# Patient Record
Sex: MALE | Race: WHITE | NOT HISPANIC OR LATINO
[De-identification: names, ages, dates, MRNs, and addresses within clinical notes are randomized per-mention and may not be internally consistent; named-entity substitution may affect disease eponyms.]

---

## 2018-09-02 ENCOUNTER — TRANSCRIPTION ENCOUNTER (OUTPATIENT)
Age: 73
End: 2018-09-02

## 2019-08-23 ENCOUNTER — APPOINTMENT (OUTPATIENT)
Dept: UROLOGY | Facility: CLINIC | Age: 74
End: 2019-08-23
Payer: MEDICARE

## 2019-08-23 VITALS
HEART RATE: 59 BPM | WEIGHT: 160 LBS | TEMPERATURE: 98.2 F | BODY MASS INDEX: 25.71 KG/M2 | SYSTOLIC BLOOD PRESSURE: 129 MMHG | RESPIRATION RATE: 17 BRPM | HEIGHT: 66 IN | DIASTOLIC BLOOD PRESSURE: 85 MMHG

## 2019-08-23 DIAGNOSIS — R39.15 URGENCY OF URINATION: ICD-10-CM

## 2019-08-23 DIAGNOSIS — N39.41 URGE INCONTINENCE: ICD-10-CM

## 2019-08-23 PROCEDURE — 99204 OFFICE O/P NEW MOD 45 MIN: CPT | Mod: 25

## 2019-08-23 PROCEDURE — 51798 US URINE CAPACITY MEASURE: CPT

## 2019-08-23 NOTE — PHYSICAL EXAM
[General Appearance - Well Developed] : well developed [Normal Appearance] : normal appearance [General Appearance - Well Nourished] : well nourished [General Appearance - In No Acute Distress] : no acute distress [Well Groomed] : well groomed [Edema] : no peripheral edema [Respiration, Rhythm And Depth] : normal respiratory rhythm and effort [Exaggerated Use Of Accessory Muscles For Inspiration] : no accessory muscle use [Abdomen Soft] : soft [Abdomen Tenderness] : non-tender [Costovertebral Angle Tenderness] : no ~M costovertebral angle tenderness [Urinary Bladder Findings] : the bladder was normal on palpation [Normal Station and Gait] : the gait and station were normal for the patient's age [] : no rash [No Focal Deficits] : no focal deficits [Sensation] : the sensory exam was normal to light touch and pinprick [Oriented To Time, Place, And Person] : oriented to person, place, and time [Affect] : the affect was normal [Not Anxious] : not anxious

## 2019-08-23 NOTE — ASSESSMENT
[FreeTextEntry1] : 75 yo male with urinary urgency with incontinence. Will give a trial of Uroxatral at this time.  Discussed behavior modification regarding symptoms.  \par \par Follow up in the coming months to discuss effectiveness of medical therapy.

## 2019-08-23 NOTE — REVIEW OF SYSTEMS
[Eyesight Problems] : eyesight problems [Heartburn] : heartburn [Dry Eyes] : dryness of the eyes [see HPI] : see HPI [Joint Pain] : joint pain [Joint Swelling] : joint swelling [Negative] : Psychiatric

## 2019-08-23 NOTE — HISTORY OF PRESENT ILLNESS
[FreeTextEntry1] : 75 yo male who presents for evaluation of urinary urgency with incontinence. He has noted worsening urinary urgency for the past six months and would like to discuss options for treatment. He reports intermittent episodes of frequency with urgency with incontinence at times. He has not noted a pattern for these symptoms and they come on at differing times. He denies hematuria, dysuria, hesitancy, and rarely has nocturia once per night and no episodes of incontinence at night. \par PVR 0\par He has prior been seen by a urologist for Herpes, and erectile dysfunction. His mother had renal cancer.  He has no prior urinary issues prior to the past 6 months to a year.

## 2019-08-24 ENCOUNTER — TRANSCRIPTION ENCOUNTER (OUTPATIENT)
Age: 74
End: 2019-08-24

## 2019-08-26 LAB
BILIRUB UR QL STRIP: NEGATIVE
GLUCOSE UR-MCNC: NEGATIVE
HCG UR QL: 0.2 EU/DL
HGB UR QL STRIP.AUTO: NORMAL
KETONES UR-MCNC: NEGATIVE
LEUKOCYTE ESTERASE UR QL STRIP: NEGATIVE
NITRITE UR QL STRIP: NEGATIVE
PH UR STRIP: 6.5
PROT UR STRIP-MCNC: NORMAL
SP GR UR STRIP: 1.01

## 2019-09-24 ENCOUNTER — MEDICATION RENEWAL (OUTPATIENT)
Age: 74
End: 2019-09-24

## 2019-09-24 RX ORDER — ALFUZOSIN HYDROCHLORIDE 10 MG/1
10 TABLET, EXTENDED RELEASE ORAL
Qty: 90 | Refills: 3 | Status: ACTIVE | COMMUNITY
Start: 2019-08-23 | End: 1900-01-01

## 2021-08-22 ENCOUNTER — TRANSCRIPTION ENCOUNTER (OUTPATIENT)
Age: 76
End: 2021-08-22

## 2022-05-14 ENCOUNTER — HOSPITAL ENCOUNTER (EMERGENCY)
Age: 77
Discharge: HOME | End: 2022-05-15
Attending: STUDENT IN AN ORGANIZED HEALTH CARE EDUCATION/TRAINING PROGRAM

## 2022-05-14 ENCOUNTER — APPOINTMENT (OUTPATIENT)
Dept: GENERAL RADIOLOGY | Age: 77
End: 2022-05-14
Attending: STUDENT IN AN ORGANIZED HEALTH CARE EDUCATION/TRAINING PROGRAM

## 2022-05-14 ENCOUNTER — APPOINTMENT (OUTPATIENT)
Dept: CT IMAGING | Age: 77
End: 2022-05-14
Attending: STUDENT IN AN ORGANIZED HEALTH CARE EDUCATION/TRAINING PROGRAM

## 2022-05-14 DIAGNOSIS — T17.228A FISHBONE IN PHARYNX: Primary | ICD-10-CM
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
## 2022-05-14 LAB
ALBUMIN SERPL-MCNC: 3.6 G/DL (ref 3.6–5.1)
ALBUMIN/GLOB SERPL: 1.2 {RATIO} (ref 1–2.4)
ALP SERPL-CCNC: 59 UNITS/L (ref 45–117)
ALT SERPL-CCNC: 31 UNITS/L
ANION GAP SERPL CALC-SCNC: 13 MMOL/L (ref 10–20)
AST SERPL-CCNC: 17 UNITS/L
BASOPHILS # BLD: 0.1 K/MCL (ref 0–0.3)
BASOPHILS NFR BLD: 1 %
BILIRUB SERPL-MCNC: 0.8 MG/DL (ref 0.2–1)
BUN SERPL-MCNC: 19 MG/DL (ref 6–20)
BUN/CREAT SERPL: 15 (ref 7–25)
CALCIUM SERPL-MCNC: 8.8 MG/DL (ref 8.4–10.2)
CHLORIDE SERPL-SCNC: 107 MMOL/L (ref 98–107)
CO2 SERPL-SCNC: 27 MMOL/L (ref 21–32)
CREAT SERPL-MCNC: 1.26 MG/DL (ref 0.67–1.17)
DEPRECATED RDW RBC: 43 FL (ref 39–50)
EOSINOPHIL # BLD: 0.5 K/MCL (ref 0–0.5)
EOSINOPHIL NFR BLD: 5 %
ERYTHROCYTE [DISTWIDTH] IN BLOOD: 12.4 % (ref 11–15)
FASTING DURATION TIME PATIENT: ABNORMAL H
GFR SERPLBLD BASED ON 1.73 SQ M-ARVRAT: 59 ML/MIN
GLOBULIN SER-MCNC: 3 G/DL (ref 2–4)
GLUCOSE SERPL-MCNC: 77 MG/DL (ref 70–99)
HCT VFR BLD CALC: 43.6 % (ref 39–51)
HGB BLD-MCNC: 14.6 G/DL (ref 13–17)
IMM GRANULOCYTES # BLD AUTO: 0.1 K/MCL (ref 0–0.2)
IMM GRANULOCYTES # BLD: 1 %
LYMPHOCYTES # BLD: 1.9 K/MCL (ref 1–4)
LYMPHOCYTES NFR BLD: 20 %
MCH RBC QN AUTO: 32.2 PG (ref 26–34)
MCHC RBC AUTO-ENTMCNC: 33.5 G/DL (ref 32–36.5)
MCV RBC AUTO: 96.2 FL (ref 78–100)
MONOCYTES # BLD: 0.9 K/MCL (ref 0.3–0.9)
MONOCYTES NFR BLD: 10 %
NEUTROPHILS # BLD: 5.9 K/MCL (ref 1.8–7.7)
NEUTROPHILS NFR BLD: 63 %
NRBC BLD MANUAL-RTO: 0 /100 WBC
PLATELET # BLD AUTO: 251 K/MCL (ref 140–450)
POTASSIUM SERPL-SCNC: 4 MMOL/L (ref 3.4–5.1)
PROT SERPL-MCNC: 6.6 G/DL (ref 6.4–8.2)
RBC # BLD: 4.53 MIL/MCL (ref 4.5–5.9)
SODIUM SERPL-SCNC: 143 MMOL/L (ref 135–145)
WBC # BLD: 9.3 K/MCL (ref 4.2–11)

## 2022-05-14 PROCEDURE — 70490 CT SOFT TISSUE NECK W/O DYE: CPT

## 2022-05-14 PROCEDURE — 85025 COMPLETE CBC W/AUTO DIFF WBC: CPT | Performed by: EMERGENCY MEDICINE

## 2022-05-14 PROCEDURE — 70360 X-RAY EXAM OF NECK: CPT

## 2022-05-14 PROCEDURE — 74018 RADEX ABDOMEN 1 VIEW: CPT

## 2022-05-14 PROCEDURE — 71250 CT THORAX DX C-: CPT

## 2022-05-14 PROCEDURE — 99284 EMERGENCY DEPT VISIT MOD MDM: CPT

## 2022-05-14 PROCEDURE — G1004 CDSM NDSC: HCPCS

## 2022-05-14 PROCEDURE — 80053 COMPREHEN METABOLIC PANEL: CPT | Performed by: EMERGENCY MEDICINE

## 2022-05-14 PROCEDURE — 71046 X-RAY EXAM CHEST 2 VIEWS: CPT

## 2022-05-14 PROCEDURE — C9803 HOPD COVID-19 SPEC COLLECT: HCPCS

## 2022-05-14 RX ORDER — ACYCLOVIR 200 MG/1
200 CAPSULE ORAL 3 TIMES DAILY
COMMUNITY

## 2022-05-14 ASSESSMENT — PAIN SCALES - GENERAL: PAINLEVEL_OUTOF10: 2

## 2022-05-15 ENCOUNTER — ANESTHESIA (OUTPATIENT)
Dept: SURGERY | Age: 77
End: 2022-05-15

## 2022-05-15 ENCOUNTER — ANESTHESIA EVENT (OUTPATIENT)
Dept: SURGERY | Age: 77
End: 2022-05-15

## 2022-05-15 LAB
FLUAV RNA RESP QL NAA+PROBE: NOT DETECTED
FLUBV RNA RESP QL NAA+PROBE: NOT DETECTED
RAINBOW EXTRA TUBES HOLD SPECIMEN: NORMAL
RAINBOW EXTRA TUBES HOLD SPECIMEN: NORMAL
RSV AG NPH QL IA.RAPID: NOT DETECTED
SARS-COV-2 RNA RESP QL NAA+PROBE: NOT DETECTED
SERVICE CMNT-IMP: NORMAL
SERVICE CMNT-IMP: NORMAL

## 2022-05-15 PROCEDURE — 88300 SURGICAL PATH GROSS: CPT | Performed by: OTOLARYNGOLOGY

## 2022-05-15 PROCEDURE — 10002800 HB RX 250 W HCPCS: Performed by: STUDENT IN AN ORGANIZED HEALTH CARE EDUCATION/TRAINING PROGRAM

## 2022-05-15 PROCEDURE — 0241U COVID/FLU/RSV PANEL: CPT | Performed by: STUDENT IN AN ORGANIZED HEALTH CARE EDUCATION/TRAINING PROGRAM

## 2022-05-15 PROCEDURE — 13000036 HB COMPLEX  CASE S/U + 1ST 15 MIN: Performed by: OTOLARYNGOLOGY

## 2022-05-15 PROCEDURE — 10004451 HB PACU RECOVERY 1ST 30 MINUTES: Performed by: OTOLARYNGOLOGY

## 2022-05-15 PROCEDURE — 13000002 HB ANESTHESIA  GENERAL  S/U + 1ST 15 MIN: Performed by: OTOLARYNGOLOGY

## 2022-05-15 PROCEDURE — 10002801 HB RX 250 W/O HCPCS: Performed by: STUDENT IN AN ORGANIZED HEALTH CARE EDUCATION/TRAINING PROGRAM

## 2022-05-15 PROCEDURE — 13000037 HB COMPLEX CASE EACH ADD MINUTE: Performed by: OTOLARYNGOLOGY

## 2022-05-15 PROCEDURE — 99285 EMERGENCY DEPT VISIT HI MDM: CPT | Performed by: STUDENT IN AN ORGANIZED HEALTH CARE EDUCATION/TRAINING PROGRAM

## 2022-05-15 PROCEDURE — 10002807 HB RX 258: Performed by: STUDENT IN AN ORGANIZED HEALTH CARE EDUCATION/TRAINING PROGRAM

## 2022-05-15 PROCEDURE — 13000003 HB ANESTHESIA  GENERAL EA ADD MINUTE: Performed by: OTOLARYNGOLOGY

## 2022-05-15 PROCEDURE — 10004452 HB PACU ADDL 30 MINUTES: Performed by: OTOLARYNGOLOGY

## 2022-05-15 PROCEDURE — 13000001 HB PHASE II RECOVERY EA 30 MINUTES: Performed by: OTOLARYNGOLOGY

## 2022-05-15 RX ORDER — LIDOCAINE HYDROCHLORIDE 40 MG/ML
SOLUTION TOPICAL PRN
Status: DISCONTINUED | OUTPATIENT
Start: 2022-05-15 | End: 2022-05-15

## 2022-05-15 RX ORDER — MIDAZOLAM HYDROCHLORIDE 1 MG/ML
INJECTION, SOLUTION INTRAMUSCULAR; INTRAVENOUS PRN
Status: DISCONTINUED | OUTPATIENT
Start: 2022-05-15 | End: 2022-05-15

## 2022-05-15 RX ORDER — SODIUM CHLORIDE, SODIUM LACTATE, POTASSIUM CHLORIDE, CALCIUM CHLORIDE 600; 310; 30; 20 MG/100ML; MG/100ML; MG/100ML; MG/100ML
INJECTION, SOLUTION INTRAVENOUS CONTINUOUS PRN
Status: DISCONTINUED | OUTPATIENT
Start: 2022-05-15 | End: 2022-05-15

## 2022-05-15 RX ORDER — PROPOFOL 10 MG/ML
INJECTION, EMULSION INTRAVENOUS PRN
Status: DISCONTINUED | OUTPATIENT
Start: 2022-05-15 | End: 2022-05-15

## 2022-05-15 RX ADMIN — SODIUM CHLORIDE, POTASSIUM CHLORIDE, SODIUM LACTATE AND CALCIUM CHLORIDE: 600; 310; 30; 20 INJECTION, SOLUTION INTRAVENOUS at 07:13

## 2022-05-15 RX ADMIN — MIDAZOLAM HYDROCHLORIDE 2 MG: 1 INJECTION, SOLUTION INTRAMUSCULAR; INTRAVENOUS at 07:22

## 2022-05-15 RX ADMIN — LIDOCAINE HYDROCHLORIDE 1 APPLICATION.: 40 SOLUTION TOPICAL at 07:30

## 2022-05-15 RX ADMIN — PROPOFOL 150 MG: 10 INJECTION, EMULSION INTRAVENOUS at 07:28

## 2022-05-15 ASSESSMENT — ENCOUNTER SYMPTOMS
DIARRHEA: 0
WHEEZING: 0
EYE PAIN: 0
WEAKNESS: 0
LIGHT-HEADEDNESS: 0
NUMBNESS: 0
FEVER: 0
APPETITE CHANGE: 0
COLOR CHANGE: 0
NAUSEA: 0
AGITATION: 0
CHILLS: 0
CHEST TIGHTNESS: 0
FATIGUE: 0
SEIZURES: 0
SORE THROAT: 0
BLOOD IN STOOL: 0
VOMITING: 0
TROUBLE SWALLOWING: 1
COUGH: 0
ABDOMINAL PAIN: 0
BACK PAIN: 0
ABDOMINAL DISTENTION: 0
NERVOUS/ANXIOUS: 0
SEIZURES: 0
SHORTNESS OF BREATH: 0
EXERCISE TOLERANCE: GOOD (>4 METS)
DIZZINESS: 0

## 2022-05-15 ASSESSMENT — PAIN SCALES - GENERAL
PAINLEVEL_OUTOF10: 0
PAINLEVEL_OUTOF10: 0

## 2022-05-16 VITALS
TEMPERATURE: 96.8 F | OXYGEN SATURATION: 95 % | SYSTOLIC BLOOD PRESSURE: 158 MMHG | BODY MASS INDEX: 27.64 KG/M2 | WEIGHT: 171.96 LBS | RESPIRATION RATE: 20 BRPM | DIASTOLIC BLOOD PRESSURE: 80 MMHG | HEIGHT: 66 IN | HEART RATE: 58 BPM

## 2022-05-17 LAB
ASR DISCLAIMER: NORMAL
CASE RPRT: NORMAL
CLINICAL INFO: NORMAL
PATH REPORT.FINAL DX SPEC: NORMAL
PATH REPORT.GROSS SPEC: NORMAL

## 2022-06-14 ENCOUNTER — EMERGENCY (EMERGENCY)
Facility: HOSPITAL | Age: 77
LOS: 1 days | Discharge: HOME | End: 2022-06-14
Attending: PERSONAL EMERGENCY RESPONSE ATTENDANT
Payer: MEDICARE

## 2022-06-14 VITALS
HEART RATE: 84 BPM | TEMPERATURE: 98 F | DIASTOLIC BLOOD PRESSURE: 89 MMHG | WEIGHT: 195.99 LBS | RESPIRATION RATE: 20 BRPM | OXYGEN SATURATION: 99 % | SYSTOLIC BLOOD PRESSURE: 166 MMHG

## 2022-06-14 VITALS
HEART RATE: 64 BPM | DIASTOLIC BLOOD PRESSURE: 67 MMHG | TEMPERATURE: 97 F | RESPIRATION RATE: 18 BRPM | OXYGEN SATURATION: 100 % | SYSTOLIC BLOOD PRESSURE: 137 MMHG

## 2022-06-14 LAB
ALBUMIN SERPL ELPH-MCNC: 3.8 G/DL — SIGNIFICANT CHANGE UP (ref 3.3–5)
ALP SERPL-CCNC: 64 U/L — SIGNIFICANT CHANGE UP (ref 40–120)
ALT FLD-CCNC: 23 U/L — SIGNIFICANT CHANGE UP (ref 10–45)
ANION GAP SERPL CALC-SCNC: 11 MMOL/L — SIGNIFICANT CHANGE UP (ref 5–17)
AST SERPL-CCNC: 21 U/L — SIGNIFICANT CHANGE UP (ref 10–40)
BASOPHILS # BLD AUTO: 0.07 K/UL — SIGNIFICANT CHANGE UP (ref 0–0.2)
BASOPHILS NFR BLD AUTO: 0.7 % — SIGNIFICANT CHANGE UP (ref 0–2)
BILIRUB SERPL-MCNC: 0.3 MG/DL — SIGNIFICANT CHANGE UP (ref 0.2–1.2)
BUN SERPL-MCNC: 15 MG/DL — SIGNIFICANT CHANGE UP (ref 7–23)
CALCIUM SERPL-MCNC: 9 MG/DL — SIGNIFICANT CHANGE UP (ref 8.4–10.5)
CHLORIDE SERPL-SCNC: 102 MMOL/L — SIGNIFICANT CHANGE UP (ref 96–108)
CO2 SERPL-SCNC: 24 MMOL/L — SIGNIFICANT CHANGE UP (ref 22–31)
CREAT SERPL-MCNC: 0.91 MG/DL — SIGNIFICANT CHANGE UP (ref 0.5–1.3)
EGFR: 87 ML/MIN/1.73M2 — SIGNIFICANT CHANGE UP
EOSINOPHIL # BLD AUTO: 0.12 K/UL — SIGNIFICANT CHANGE UP (ref 0–0.5)
EOSINOPHIL NFR BLD AUTO: 1.3 % — SIGNIFICANT CHANGE UP (ref 0–6)
GLUCOSE SERPL-MCNC: 105 MG/DL — HIGH (ref 70–99)
HCT VFR BLD CALC: 41.9 % — SIGNIFICANT CHANGE UP (ref 39–50)
HGB BLD-MCNC: 13.6 G/DL — SIGNIFICANT CHANGE UP (ref 13–17)
IMM GRANULOCYTES NFR BLD AUTO: 2 % — HIGH (ref 0–1.5)
LYMPHOCYTES # BLD AUTO: 1.12 K/UL — SIGNIFICANT CHANGE UP (ref 1–3.3)
LYMPHOCYTES # BLD AUTO: 11.7 % — LOW (ref 13–44)
MCHC RBC-ENTMCNC: 31 PG — SIGNIFICANT CHANGE UP (ref 27–34)
MCHC RBC-ENTMCNC: 32.5 GM/DL — SIGNIFICANT CHANGE UP (ref 32–36)
MCV RBC AUTO: 95.4 FL — SIGNIFICANT CHANGE UP (ref 80–100)
MONOCYTES # BLD AUTO: 1.06 K/UL — HIGH (ref 0–0.9)
MONOCYTES NFR BLD AUTO: 11 % — SIGNIFICANT CHANGE UP (ref 2–14)
NEUTROPHILS # BLD AUTO: 7.04 K/UL — SIGNIFICANT CHANGE UP (ref 1.8–7.4)
NEUTROPHILS NFR BLD AUTO: 73.3 % — SIGNIFICANT CHANGE UP (ref 43–77)
NRBC # BLD: 0 /100 WBCS — SIGNIFICANT CHANGE UP (ref 0–0)
PLATELET # BLD AUTO: 244 K/UL — SIGNIFICANT CHANGE UP (ref 150–400)
POTASSIUM SERPL-MCNC: 4.7 MMOL/L — SIGNIFICANT CHANGE UP (ref 3.5–5.3)
POTASSIUM SERPL-SCNC: 4.7 MMOL/L — SIGNIFICANT CHANGE UP (ref 3.5–5.3)
PROT SERPL-MCNC: 6.8 G/DL — SIGNIFICANT CHANGE UP (ref 6–8.3)
RAPID RVP RESULT: DETECTED
RBC # BLD: 4.39 M/UL — SIGNIFICANT CHANGE UP (ref 4.2–5.8)
RBC # FLD: 11.9 % — SIGNIFICANT CHANGE UP (ref 10.3–14.5)
SARS-COV-2 RNA SPEC QL NAA+PROBE: DETECTED
SODIUM SERPL-SCNC: 137 MMOL/L — SIGNIFICANT CHANGE UP (ref 135–145)
WBC # BLD: 9.6 K/UL — SIGNIFICANT CHANGE UP (ref 3.8–10.5)
WBC # FLD AUTO: 9.6 K/UL — SIGNIFICANT CHANGE UP (ref 3.8–10.5)

## 2022-06-14 PROCEDURE — 71045 X-RAY EXAM CHEST 1 VIEW: CPT | Mod: 26

## 2022-06-14 PROCEDURE — 85025 COMPLETE CBC W/AUTO DIFF WBC: CPT

## 2022-06-14 PROCEDURE — 71045 X-RAY EXAM CHEST 1 VIEW: CPT

## 2022-06-14 PROCEDURE — 99285 EMERGENCY DEPT VISIT HI MDM: CPT | Mod: FS,CS

## 2022-06-14 PROCEDURE — 80053 COMPREHEN METABOLIC PANEL: CPT

## 2022-06-14 PROCEDURE — 0225U NFCT DS DNA&RNA 21 SARSCOV2: CPT

## 2022-06-14 PROCEDURE — 99283 EMERGENCY DEPT VISIT LOW MDM: CPT | Mod: 25

## 2022-06-14 NOTE — ED PROVIDER NOTE - OBJECTIVE STATEMENT
77 y.o. male with PMHx of seasonal allergies (on daily zyrtec) and dyslipidemia presents to the ED with c/o weakness.  He states he has been COVID+ since June 1 and symptomatic with cough, congestion, fatigue, and weakness on and off since May 31.  He was prescribed Paxlovid on 6/1 and completed the series.  Friday, he began experiencing "head cold" symptoms so he retested and was COVID+.  He was informed by his PCP he was experiencing rebound from the Paxlovid.  Yesterday, he began having R. sided sinus pressure/pain radiating to R. ear and took erythromycin prescribed by his PCP and coricidin on the recommendation of a friend.  Began feeling disoriented with increasing weakness afterwards.  He states symptoms have improved since he ate breakfast and also stopped taking coricidin and took only one dose of the erythromycin.

## 2022-06-14 NOTE — ED ADULT NURSE NOTE - OBJECTIVE STATEMENT
patient received alert & oriented x3, ambulatory from home complaining of cough, "headcold, sinus infection, right side glands swollen"  Took a cough medicine with histamine. Woke up feeling generally spacy & weak. Denies fever, sob. Tested covid positive last June 1st & was given paxlovid. Home tested yesterday & still positive. Claimed cough lessened but bothered with sinus congestion. Fully vaccinated + 2 boosters. patient received alert & oriented x3, ambulatory from home complaining of cough, "headcold, sinus infection, right side glands swollen"  Took a cough medicine with histamine last night. Woke up feeling generally spacy & weak. Denies fever, sob. Tested covid positive last June 1st & was given paxlovid. Home tested yesterday & still positive. Wife also positive. Claimed cough lessened but bothered with sinus congestion. Fully vaccinated + 2 boosters.

## 2022-06-14 NOTE — ED PROVIDER NOTE - PHYSICAL EXAMINATION
GEN: Pt in NAD, A&O x3.  PSYCH: Affect appropriate.  EYES: Sclera white w/o injection.   ENT: Head NCAT. MMM. Neck supple FROM.  RESP: CTA b/l, no wheezes, rales, or rhonchi. no chest wall tenderness  CARDIAC: RRR, clear distinct S1, S2, no appreciable murmurs.  ABD: Abdomen soft, non-tender. No CVAT b/l.  VASC: 2+ radial and dorsalis pedis pulses b/l. No edema or calf tenderness.  NEURO: CN2-12 grossly intact. Normal and equal sensation and 5/5 strength UE and LE b/l. Pronator drift negative. Normal gait and gross cerebellar function.

## 2022-06-14 NOTE — ED PROVIDER NOTE - ATTENDING APP SHARED VISIT CONTRIBUTION OF CARE
Attending MD Villa.  Agree with above.  Pt is a 76 yo male with pmhx of Complaint of covid+ since June 1.  Pt rxed paxlovid initially, took it and finished it but sxs remain.  Felt sinus discomfort and was told he's experiencing paxlovid rebound and to anticipate 2-3 wks sxs.  Pt's friends told him about coricidin which he took with erythromycin which he'd been rxed for 'sinus infection' and began feeling generalized weakness, loopiness.  Today feels better not having taken coricidin. Pt also takes zyrtec daily.    Pt without evidence of acute bacterial sinus infection at this visit s/p single dose erythromycin.  No recent fevers.  Pt is well appearing.  Counseled re: discontinuation of abxs as they do not appear to be warranted in setting of viral syndrome/viral sinus infection. Pt endorses improvement in sxs since waking this morning.  Planned screening labs, CXR, RVP.

## 2022-06-14 NOTE — ED ADULT TRIAGE NOTE - PAIN RATING/NUMBER SCALE (0-10): REST
EXAM note    Chief Complaint   Patient presents with   • Abdominal Pain     Patient is here for left side pain that radiates from navel to back on left side.Patient states this has been going on for 1 week now.        History    Andi Wright is a 34 year old male who presents for evaluation of left lower quadrant abdominal pain. Pain is located from \"belt-line to hip bone\" for the past week.  No known injury.  No nausea, vomiting or diarrhea.  Afebrile.  He is having bowel movements regularly and passing gas. No bloody or black/tarry stools. He thinks he may feel a \"rope\" that sticks up about 1/2\".   He states that it is painful enough that he left work early yesterday.  He has tried some tumeric without improvement. He does a significant amount of lifting at his work, he and his partner lift 180-200 pound objects constantly throughout the day, up to 80 times per his report. He has had previous hernia repair, worried he has another hernia. While here would like his left wrist looked at as well. Reports that he has pain and a pulling sensation on the lateral and medial aspects of the wrist with movement. No redness, warmth, swelling. No known injury. Again, reports that he does a lot of repetitive motions with his hands throughout his work day. Denies any numbness or tingling in the wrists.     No current outpatient prescriptions on file.     No current facility-administered medications for this visit.        ALLERGIES:   Allergen Reactions   • Prilosec Otc Other (See Comments)     Throat swelling   • Nexium        Past Medical History:   Diagnosis Date   • Hope's esophagus    • Depression with anxiety    • Esophageal reflux disease    • Nephrolithiasis        Review of systems    See history of present illness, otherwise 10 point review of systems is negative.     Physical Examination    Vital Signs:    Vitals:    03/22/18 1053   BP: 114/58   Pulse: 60   Resp: 12   Temp: 98.9 °F (37.2 °C)   SpO2: 99%   Weight:  68.4 kg   Height: 5' 11\" (1.803 m)     Constitutional:  No acute distress. Acting and behaving appropriately.   Integument:  Warm. Dry. No erythema. No rash.    Respiratory:  Lungs clear to auscultation, bilaterally equal rise and fall. Respirations non-labored.  Cardiovascular:  S1, S2, regular rate and rhythm. No murmurs, rubs, or gallops.    Gastrointestinal:  Bowel sounds normal. Soft. No tenderness. No masses. No hepatomegaly or splenomegaly.  No costovertebral angle tenderness.    Extremities:  Intact distal pulses, No edema, No tenderness, No cyanosis, No clubbing.   Musculoskeletal:  No obvious joint abnormalities.  Normal range of motion in all 4 extremities.    Neurologic:  Alert and oriented times 3.      Assessment AND Plan    1. Strain of abdominal muscle, initial encounter: Reassurance provided, no hernia noted. Likely muscle strain. Advised to rest, use Ibuprofen as needed. Work restrictions in place x 1 week, no lifting greater than 50 pounds. Follow up as needed.     2. Tendonitis of wrist, left: Rest, ice, Ibuprofen. Declines physical therapy at this time due to insurance issues. Will call if issues worsen or fail to improve.          See Patient Instruction section.  Return if symptoms worsen or fail to improve.     4

## 2022-06-14 NOTE — ED PROVIDER NOTE - NS ED ATTENDING STATEMENT MOD
This was a shared visit with the ANN MARIE. I reviewed and verified the documentation and independently performed the documented:

## 2022-06-14 NOTE — ED PROVIDER NOTE - NSFOLLOWUPINSTRUCTIONS_ED_ALL_ED_FT
You were seen in the emergency department for COVID-19.  Please read all attached patient information, read all additional instructions below, and follow-up with all providers as directed.    1) Follow-up with your primary care provider in 3-4 days.    2) Stop taking the erythromycin and the coricidin.  Continue to take your regularly prescribed daily medications.    3) Rest and stay hydrated. Pain can be managed with Acetaminophen (aka Tylenol) and Ibuprofen (aka Motrin or Advil) over the counter as directed.    4) Return to the ER for any new or worsening symptoms.      Please read all attached patient information.

## 2022-06-14 NOTE — ED ADULT NURSE NOTE - CAS EDN INTEG ASSESS
89 yo M with H/o CAD s/p Stentsx2 about 8-10 years ago, AS, Lt food drop, Spinal stenosis vs spondylolisthesis (unclear) s/p fusion surgery presents today to the ED after was found to be anemic by [facility]. Physician was called because pt was having lower abdominal pain 89 yo M with H/o CAD s/p Stents x2 about 8-10 years ago, AS, Lt foot drop, Spinal stenosis vs spondylolisthesis (unclear) s/p fusion surgery presents today to the ED after was found to be anemic by labs drawn [facility]. Physician was called because pt was having lower abdominal pain and coinciding episodes of black stools. Pt also had episodes of emesis on friday and today but with no red blood or coffee ground content.   Pt then presented to our ED and was found to have a  HB of 6.3 a drop from baseline of 10.8 last month.   Pt denies chest pain, palpitations, SOB, cough, fever, dizziness, blurring of vision. Pt denies using any NSAIDS, ASA or Anticoagulants.     PMH: As above  PSH: Spinal fusion, CAD s/p stents x2      .  VITAL SIGNS:    Orthostatics : NEGATIVE   T(C): 37.1 (11-06-17 @ 22:43), Max: 37.1 (11-06-17 @ 22:43)  T(F): 98.8 (11-06-17 @ 22:43), Max: 98.8 (11-06-17 @ 22:43)  HR: 72 (11-06-17 @ 22:43) (72 - 97)  BP: 129/66 (11-06-17 @ 22:43) (129/66 - 149/89)  BP(mean): --  RR: 18 (11-06-17 @ 21:50) (18 - 18)  SpO2: 100% (11-06-17 @ 21:50) (100% - 100%)  Wt(kg): --    PHYSICAL EXAM:    Constitutional: WDWN resting comfortably in bed; NAD  Head: NC/AT  Eyes: PERRL, EOMI, anicteric sclera  ENT: no nasal discharge; uvula midline, no oropharyngeal erythema or exudates; pt oral mucosa looks dry.  Neck: supple; no JVD or thyromegaly  Respiratory: CTA B/L; no W/R/R, no retractions  Cardiac: +S1/S2; RRR; no M/R/G; PMI non-displaced  Gastrointestinal: abdomen soft, NT/ND; no rebound or guarding; +BSx4, LICO: MELANOTIC STOOLS.  Extremities: WWP, no clubbing or cyanosis; no peripheral edema  Vascular: 2+ radial, femoral, DP/PT pulses B/L  Dermatologic: Pt looks pale( more than before as per nursing aid)  Neurologic: AAOx3; CNII-XII grossly intact; no focal deficits      .  LABS:                         6.3    7.1   )-----------( 514      ( 06 Nov 2017 22:16 )             18.6     11-06    140  |  102  |  26<H>  ----------------------------<  125<H>  4.2   |  26  |  1.12    Ca    8.5      06 Nov 2017 22:16    TPro  6.3  /  Alb  3.4  /  TBili  0.3  /  DBili  x   /  AST  25  /  ALT  17  /  AlkPhos  49  11-06    PT/INR - ( 06 Nov 2017 22:16 )   PT: 11.0 sec;   INR: 0.99          PTT - ( 06 Nov 2017 22:16 )  PTT:24.7 sec - - -

## 2022-10-05 ENCOUNTER — NON-APPOINTMENT (OUTPATIENT)
Age: 77
End: 2022-10-05

## 2023-06-12 ENCOUNTER — NON-APPOINTMENT (OUTPATIENT)
Age: 78
End: 2023-06-12

## 2023-06-13 ENCOUNTER — APPOINTMENT (OUTPATIENT)
Dept: DERMATOLOGY | Facility: CLINIC | Age: 78
End: 2023-06-13
Payer: MEDICARE

## 2023-06-13 VITALS — WEIGHT: 159.9 LBS | BODY MASS INDEX: 25.81 KG/M2

## 2023-06-13 DIAGNOSIS — L30.9 DERMATITIS, UNSPECIFIED: ICD-10-CM

## 2023-06-13 PROCEDURE — 11104 PUNCH BX SKIN SINGLE LESION: CPT

## 2023-06-13 PROCEDURE — 99204 OFFICE O/P NEW MOD 45 MIN: CPT | Mod: 25

## 2023-06-13 RX ORDER — GABAPENTIN 300 MG/1
300 CAPSULE ORAL
Qty: 90 | Refills: 1 | Status: ACTIVE | COMMUNITY
Start: 2023-06-13 | End: 1900-01-01

## 2023-06-15 ENCOUNTER — NON-APPOINTMENT (OUTPATIENT)
Age: 78
End: 2023-06-15

## 2023-06-16 ENCOUNTER — NON-APPOINTMENT (OUTPATIENT)
Age: 78
End: 2023-06-16

## 2023-06-19 ENCOUNTER — NON-APPOINTMENT (OUTPATIENT)
Age: 78
End: 2023-06-19

## 2023-06-24 ENCOUNTER — NON-APPOINTMENT (OUTPATIENT)
Age: 78
End: 2023-06-24

## 2023-06-26 RX ORDER — PREDNISONE 20 MG/1
20 TABLET ORAL
Qty: 42 | Refills: 0 | Status: ACTIVE | COMMUNITY
Start: 2023-06-13 | End: 1900-01-01

## 2023-06-27 RX ORDER — SECUKINUMAB 150 MG/ML
INJECTION SUBCUTANEOUS
Qty: 10 | Refills: 0 | Status: ACTIVE | COMMUNITY
Start: 2023-06-27

## 2023-06-27 RX ORDER — USTEKINUMAB 90 MG/ML
INJECTION, SOLUTION SUBCUTANEOUS
Qty: 1 | Refills: 3 | Status: DISCONTINUED | COMMUNITY
Start: 2023-06-26 | End: 2023-06-27

## 2023-06-27 RX ORDER — USTEKINUMAB 90 MG/ML
INJECTION, SOLUTION SUBCUTANEOUS
Qty: 1 | Refills: 1 | Status: DISCONTINUED | COMMUNITY
Start: 2023-06-26 | End: 2023-06-27

## 2023-06-29 ENCOUNTER — NON-APPOINTMENT (OUTPATIENT)
Age: 78
End: 2023-06-29

## 2023-06-30 ENCOUNTER — APPOINTMENT (OUTPATIENT)
Dept: DERMATOLOGY | Facility: CLINIC | Age: 78
End: 2023-06-30
Payer: MEDICARE

## 2023-06-30 PROCEDURE — 99214 OFFICE O/P EST MOD 30 MIN: CPT

## 2023-06-30 RX ORDER — HYDROXYZINE HYDROCHLORIDE 25 MG/1
25 TABLET ORAL
Qty: 90 | Refills: 0 | Status: ACTIVE | COMMUNITY
Start: 2023-06-30 | End: 1900-01-01

## 2023-07-03 LAB
ALBUMIN SERPL ELPH-MCNC: 4 G/DL
ALP BLD-CCNC: 59 U/L
ALT SERPL-CCNC: 53 U/L
ANION GAP SERPL CALC-SCNC: 13 MMOL/L
AST SERPL-CCNC: 21 U/L
BILIRUB SERPL-MCNC: 0.4 MG/DL
BUN SERPL-MCNC: 30 MG/DL
CALCIUM SERPL-MCNC: 9.6 MG/DL
CHLORIDE SERPL-SCNC: 99 MMOL/L
CO2 SERPL-SCNC: 26 MMOL/L
CREAT SERPL-MCNC: 1.18 MG/DL
DERMATOLOGY BIOPSY: NORMAL
EGFR: 63 ML/MIN/1.73M2
GLUCOSE SERPL-MCNC: 164 MG/DL
HBV CORE IGG+IGM SER QL: NONREACTIVE
HBV SURFACE AB SER QL: NONREACTIVE
HBV SURFACE AG SER QL: NONREACTIVE
HCV AB SER QL: NONREACTIVE
HCV S/CO RATIO: 0.07 S/CO
M TB IFN-G BLD-IMP: ABNORMAL
POTASSIUM SERPL-SCNC: 4.7 MMOL/L
PROT SERPL-MCNC: 5.9 G/DL
QUANTIFERON TB PLUS MITOGEN MINUS NIL: 0.43 IU/ML
QUANTIFERON TB PLUS NIL: 0.01 IU/ML
QUANTIFERON TB PLUS TB1 MINUS NIL: 0 IU/ML
QUANTIFERON TB PLUS TB2 MINUS NIL: 0 IU/ML
SODIUM SERPL-SCNC: 137 MMOL/L

## 2023-07-03 RX ORDER — TRIAMCINOLONE ACETONIDE 1 MG/G
0.1 CREAM TOPICAL
Qty: 1 | Refills: 1 | Status: ACTIVE | COMMUNITY
Start: 2023-07-03 | End: 1900-01-01

## 2023-07-03 RX ORDER — UREA 40 G/100G
40 CREAM TOPICAL
Qty: 2 | Refills: 2 | Status: ACTIVE | COMMUNITY
Start: 2023-07-03 | End: 1900-01-01

## 2023-07-03 RX ORDER — FOLIC ACID 1 MG/1
1 TABLET ORAL DAILY
Qty: 30 | Refills: 4 | Status: ACTIVE | COMMUNITY
Start: 2023-07-03 | End: 1900-01-01

## 2023-07-03 RX ORDER — METHOTREXATE 2.5 MG/1
2.5 TABLET ORAL
Qty: 24 | Refills: 1 | Status: ACTIVE | COMMUNITY
Start: 2023-07-03 | End: 1900-01-01

## 2023-07-03 RX ORDER — TRIAMCINOLONE ACETONIDE 1 MG/G
0.1 OINTMENT TOPICAL
Qty: 1 | Refills: 1 | Status: ACTIVE | COMMUNITY
Start: 2023-07-03 | End: 1900-01-01

## 2023-07-09 LAB
ALBUMIN SERPL ELPH-MCNC: 3.6 G/DL
ALP BLD-CCNC: 60 U/L
ALT SERPL-CCNC: 54 U/L
ANION GAP SERPL CALC-SCNC: 10 MMOL/L
AST SERPL-CCNC: 20 U/L
BILIRUB SERPL-MCNC: 0.5 MG/DL
BUN SERPL-MCNC: 28 MG/DL
CALCIUM SERPL-MCNC: 9.4 MG/DL
CHLORIDE SERPL-SCNC: 98 MMOL/L
CHOLEST SERPL-MCNC: 148 MG/DL
CO2 SERPL-SCNC: 27 MMOL/L
CREAT SERPL-MCNC: 1.17 MG/DL
EGFR: 64 ML/MIN/1.73M2
GLUCOSE SERPL-MCNC: 94 MG/DL
HDLC SERPL-MCNC: 71 MG/DL
LDLC SERPL CALC-MCNC: 63 MG/DL
M TB IFN-G BLD-IMP: NEGATIVE
NONHDLC SERPL-MCNC: 77 MG/DL
POTASSIUM SERPL-SCNC: 4.7 MMOL/L
PROT SERPL-MCNC: 5.4 G/DL
QUANTIFERON TB PLUS MITOGEN MINUS NIL: 6.81 IU/ML
QUANTIFERON TB PLUS NIL: 0.02 IU/ML
QUANTIFERON TB PLUS TB1 MINUS NIL: 0.01 IU/ML
QUANTIFERON TB PLUS TB2 MINUS NIL: 0 IU/ML
SODIUM SERPL-SCNC: 134 MMOL/L
TRIGL SERPL-MCNC: 71 MG/DL

## 2023-07-10 ENCOUNTER — APPOINTMENT (OUTPATIENT)
Dept: RADIOLOGY | Facility: CLINIC | Age: 78
End: 2023-07-10
Payer: MEDICARE

## 2023-07-10 PROCEDURE — 77080 DXA BONE DENSITY AXIAL: CPT

## 2023-07-11 ENCOUNTER — APPOINTMENT (OUTPATIENT)
Dept: INTERNAL MEDICINE | Facility: CLINIC | Age: 78
End: 2023-07-11

## 2023-07-11 ENCOUNTER — OUTPATIENT (OUTPATIENT)
Dept: OUTPATIENT SERVICES | Facility: HOSPITAL | Age: 78
LOS: 1 days | End: 2023-07-11

## 2023-07-11 ENCOUNTER — APPOINTMENT (OUTPATIENT)
Dept: DERMATOLOGY | Facility: CLINIC | Age: 78
End: 2023-07-11
Payer: MEDICARE

## 2023-07-11 PROCEDURE — 96401 CHEMO ANTI-NEOPL SQ/IM: CPT

## 2023-07-11 PROCEDURE — 99214 OFFICE O/P EST MOD 30 MIN: CPT | Mod: 25

## 2023-07-14 ENCOUNTER — INPATIENT (INPATIENT)
Facility: HOSPITAL | Age: 78
LOS: 9 days | Discharge: HOME | DRG: 872 | End: 2023-07-24
Attending: STUDENT IN AN ORGANIZED HEALTH CARE EDUCATION/TRAINING PROGRAM | Admitting: STUDENT IN AN ORGANIZED HEALTH CARE EDUCATION/TRAINING PROGRAM
Payer: MEDICARE

## 2023-07-14 VITALS
WEIGHT: 162.04 LBS | TEMPERATURE: 100 F | SYSTOLIC BLOOD PRESSURE: 151 MMHG | RESPIRATION RATE: 20 BRPM | HEIGHT: 66 IN | OXYGEN SATURATION: 96 % | HEART RATE: 122 BPM | DIASTOLIC BLOOD PRESSURE: 72 MMHG

## 2023-07-14 DIAGNOSIS — A41.9 SEPSIS, UNSPECIFIED ORGANISM: ICD-10-CM

## 2023-07-14 LAB
APPEARANCE UR: CLEAR — SIGNIFICANT CHANGE UP
APTT BLD: 28.5 SEC — SIGNIFICANT CHANGE UP (ref 27.5–35.5)
BASE EXCESS BLDV CALC-SCNC: 5.3 MMOL/L — HIGH (ref -2–3)
BASOPHILS # BLD AUTO: 0.04 K/UL — SIGNIFICANT CHANGE UP (ref 0–0.2)
BASOPHILS NFR BLD AUTO: 0.3 % — SIGNIFICANT CHANGE UP (ref 0–2)
BILIRUB UR-MCNC: NEGATIVE — SIGNIFICANT CHANGE UP
CA-I SERPL-SCNC: 1.26 MMOL/L — SIGNIFICANT CHANGE UP (ref 1.15–1.33)
CHLORIDE BLDV-SCNC: 98 MMOL/L — SIGNIFICANT CHANGE UP (ref 96–108)
CO2 BLDV-SCNC: 32 MMOL/L — HIGH (ref 22–26)
COLOR SPEC: SIGNIFICANT CHANGE UP
DIFF PNL FLD: NEGATIVE — SIGNIFICANT CHANGE UP
EOSINOPHIL # BLD AUTO: 0.08 K/UL — SIGNIFICANT CHANGE UP (ref 0–0.5)
EOSINOPHIL NFR BLD AUTO: 0.6 % — SIGNIFICANT CHANGE UP (ref 0–6)
GAS PNL BLDV: 130 MMOL/L — LOW (ref 136–145)
GAS PNL BLDV: SIGNIFICANT CHANGE UP
GLUCOSE BLDV-MCNC: 100 MG/DL — HIGH (ref 70–99)
GLUCOSE UR QL: NEGATIVE — SIGNIFICANT CHANGE UP
HCO3 BLDV-SCNC: 30 MMOL/L — HIGH (ref 22–29)
HCOV PNL SPEC NAA+PROBE: DETECTED
HCT VFR BLD CALC: 38.9 % — LOW (ref 39–50)
HCT VFR BLDA CALC: 40 % — SIGNIFICANT CHANGE UP (ref 39–51)
HGB BLD CALC-MCNC: 13.3 G/DL — SIGNIFICANT CHANGE UP (ref 12.6–17.4)
HGB BLD-MCNC: 12.8 G/DL — LOW (ref 13–17)
IMM GRANULOCYTES NFR BLD AUTO: 2.5 % — HIGH (ref 0–0.9)
INR BLD: 1.1 RATIO — SIGNIFICANT CHANGE UP (ref 0.88–1.16)
KETONES UR-MCNC: NEGATIVE — SIGNIFICANT CHANGE UP
LACTATE BLDV-MCNC: 1.6 MMOL/L — SIGNIFICANT CHANGE UP (ref 0.5–2)
LEUKOCYTE ESTERASE UR-ACNC: NEGATIVE — SIGNIFICANT CHANGE UP
LYMPHOCYTES # BLD AUTO: 0.49 K/UL — LOW (ref 1–3.3)
LYMPHOCYTES # BLD AUTO: 3.7 % — LOW (ref 13–44)
MCHC RBC-ENTMCNC: 30.8 PG — SIGNIFICANT CHANGE UP (ref 27–34)
MCHC RBC-ENTMCNC: 32.9 GM/DL — SIGNIFICANT CHANGE UP (ref 32–36)
MCV RBC AUTO: 93.5 FL — SIGNIFICANT CHANGE UP (ref 80–100)
MONOCYTES # BLD AUTO: 0.46 K/UL — SIGNIFICANT CHANGE UP (ref 0–0.9)
MONOCYTES NFR BLD AUTO: 3.5 % — SIGNIFICANT CHANGE UP (ref 2–14)
NEUTROPHILS # BLD AUTO: 11.83 K/UL — HIGH (ref 1.8–7.4)
NEUTROPHILS NFR BLD AUTO: 89.4 % — HIGH (ref 43–77)
NITRITE UR-MCNC: NEGATIVE — SIGNIFICANT CHANGE UP
NRBC # BLD: 0 /100 WBCS — SIGNIFICANT CHANGE UP (ref 0–0)
NT-PROBNP SERPL-SCNC: 301 PG/ML — HIGH (ref 0–300)
PCO2 BLDV: 46 MMHG — SIGNIFICANT CHANGE UP (ref 42–55)
PH BLDV: 7.43 — SIGNIFICANT CHANGE UP (ref 7.32–7.43)
PH UR: 6.5 — SIGNIFICANT CHANGE UP (ref 5–8)
PLATELET # BLD AUTO: 262 K/UL — SIGNIFICANT CHANGE UP (ref 150–400)
PO2 BLDV: 49 MMHG — HIGH (ref 25–45)
POTASSIUM BLDV-SCNC: 4.1 MMOL/L — SIGNIFICANT CHANGE UP (ref 3.5–5.1)
PROT UR-MCNC: SIGNIFICANT CHANGE UP
PROTHROM AB SERPL-ACNC: 12.7 SEC — SIGNIFICANT CHANGE UP (ref 10.5–13.4)
RAPID RVP RESULT: DETECTED
RBC # BLD: 4.16 M/UL — LOW (ref 4.2–5.8)
RBC # FLD: 12.9 % — SIGNIFICANT CHANGE UP (ref 10.3–14.5)
SAO2 % BLDV: 80.7 % — SIGNIFICANT CHANGE UP (ref 67–88)
SARS-COV-2 RNA SPEC QL NAA+PROBE: SIGNIFICANT CHANGE UP
SP GR SPEC: 1.01 — SIGNIFICANT CHANGE UP (ref 1.01–1.02)
TROPONIN T, HIGH SENSITIVITY RESULT: 22 NG/L — SIGNIFICANT CHANGE UP (ref 0–51)
UROBILINOGEN FLD QL: NEGATIVE — SIGNIFICANT CHANGE UP
WBC # BLD: 13.23 K/UL — HIGH (ref 3.8–10.5)
WBC # FLD AUTO: 13.23 K/UL — HIGH (ref 3.8–10.5)

## 2023-07-14 PROCEDURE — 99285 EMERGENCY DEPT VISIT HI MDM: CPT | Mod: FS

## 2023-07-14 PROCEDURE — 71045 X-RAY EXAM CHEST 1 VIEW: CPT | Mod: 26

## 2023-07-14 RX ORDER — DIPHENHYDRAMINE HCL 50 MG
25 CAPSULE ORAL EVERY 4 HOURS
Refills: 0 | Status: DISCONTINUED | OUTPATIENT
Start: 2023-07-14 | End: 2023-07-16

## 2023-07-14 RX ORDER — ACETAMINOPHEN 500 MG
650 TABLET ORAL EVERY 6 HOURS
Refills: 0 | Status: DISCONTINUED | OUTPATIENT
Start: 2023-07-14 | End: 2023-07-24

## 2023-07-14 RX ORDER — ACETAMINOPHEN 500 MG
650 TABLET ORAL ONCE
Refills: 0 | Status: COMPLETED | OUTPATIENT
Start: 2023-07-14 | End: 2023-07-14

## 2023-07-14 RX ORDER — VALACYCLOVIR 500 MG/1
500 TABLET, FILM COATED ORAL DAILY
Refills: 0 | Status: DISCONTINUED | OUTPATIENT
Start: 2023-07-14 | End: 2023-07-24

## 2023-07-14 RX ORDER — CEFEPIME 1 G/1
2000 INJECTION, POWDER, FOR SOLUTION INTRAMUSCULAR; INTRAVENOUS ONCE
Refills: 0 | Status: COMPLETED | OUTPATIENT
Start: 2023-07-14 | End: 2023-07-14

## 2023-07-14 RX ORDER — LANOLIN ALCOHOL/MO/W.PET/CERES
5 CREAM (GRAM) TOPICAL AT BEDTIME
Refills: 0 | Status: DISCONTINUED | OUTPATIENT
Start: 2023-07-14 | End: 2023-07-24

## 2023-07-14 RX ORDER — LANOLIN ALCOHOL/MO/W.PET/CERES
3 CREAM (GRAM) TOPICAL AT BEDTIME
Refills: 0 | Status: DISCONTINUED | OUTPATIENT
Start: 2023-07-14 | End: 2023-07-14

## 2023-07-14 RX ORDER — PANTOPRAZOLE SODIUM 20 MG/1
40 TABLET, DELAYED RELEASE ORAL
Refills: 0 | Status: DISCONTINUED | OUTPATIENT
Start: 2023-07-14 | End: 2023-07-20

## 2023-07-14 RX ORDER — ENOXAPARIN SODIUM 100 MG/ML
40 INJECTION SUBCUTANEOUS EVERY 24 HOURS
Refills: 0 | Status: DISCONTINUED | OUTPATIENT
Start: 2023-07-14 | End: 2023-07-18

## 2023-07-14 RX ORDER — ONDANSETRON 8 MG/1
4 TABLET, FILM COATED ORAL EVERY 8 HOURS
Refills: 0 | Status: DISCONTINUED | OUTPATIENT
Start: 2023-07-14 | End: 2023-07-24

## 2023-07-14 RX ORDER — TAMSULOSIN HYDROCHLORIDE 0.4 MG/1
0.8 CAPSULE ORAL AT BEDTIME
Refills: 0 | Status: DISCONTINUED | OUTPATIENT
Start: 2023-07-14 | End: 2023-07-24

## 2023-07-14 RX ORDER — VANCOMYCIN HCL 1 G
1000 VIAL (EA) INTRAVENOUS ONCE
Refills: 0 | Status: COMPLETED | OUTPATIENT
Start: 2023-07-14 | End: 2023-07-14

## 2023-07-14 RX ORDER — SIMVASTATIN 20 MG/1
40 TABLET, FILM COATED ORAL AT BEDTIME
Refills: 0 | Status: DISCONTINUED | OUTPATIENT
Start: 2023-07-14 | End: 2023-07-24

## 2023-07-14 RX ADMIN — CEFEPIME 100 MILLIGRAM(S): 1 INJECTION, POWDER, FOR SOLUTION INTRAMUSCULAR; INTRAVENOUS at 09:25

## 2023-07-14 RX ADMIN — Medication 250 MILLIGRAM(S): at 10:15

## 2023-07-14 RX ADMIN — Medication 650 MILLIGRAM(S): at 09:55

## 2023-07-14 RX ADMIN — SIMVASTATIN 40 MILLIGRAM(S): 20 TABLET, FILM COATED ORAL at 22:38

## 2023-07-14 RX ADMIN — Medication 650 MILLIGRAM(S): at 09:25

## 2023-07-14 NOTE — H&P ADULT - HISTORY OF PRESENT ILLNESS
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 Presents to [facility] for worsening fever. Patient states he was recently started on Skyrizi and since then has been experiencing increased weakness, fatigue, bilateral LE edema and fever last night (temp max 102.5) which was relieved by Tylenol. Patient and spouse present to the ED for further evaluation. Denies any chest pain, n/v/d or recent sick contacts.

## 2023-07-14 NOTE — ED PROVIDER NOTE - NS ED ATTENDING STATEMENT MOD
I have seen and examined this patient and fully participated in the care of this patient as the teaching attending.  The service was shared with the ANN MARIE.  I reviewed and verified the documentation and independently performed the documented:

## 2023-07-14 NOTE — ED PROVIDER NOTE - ADMIT DISPOSITION PRESENT ON ADMISSION SEPSIS Q1 - RE-EVALUATED PATIENT FLUID AND VITAL SIGNS
Elsa walks into the office today asking for a prescription for compression sleeve. An order is printed out, she will hand deliver this prescription to [employer]    Fluid bolus in progress

## 2023-07-14 NOTE — ED PROVIDER NOTE - PHYSICAL EXAMINATION
CONSTITUTIONAL: Patient is awake, alert and oriented x 3. Patient is well appearing and in no acute distress  HEAD: NCAT  EYES: PERRL b/l, EOMI  NECK: supple, FROM  LUNGS: CTA b/l, no wheezing or rales   HEART: RRR.+S1S2 no murmurs  ABDOMEN: Soft, non-distended, nttp, no rebound or guarding  EXTREMITY: 2+ pitting edema b/l, FROM upper and lower ext b/l  SKIN: Diffuse intense erythema to skin with associated warmth otherwise with no rash or lesions  NEURO: No focal deficits

## 2023-07-14 NOTE — CONSULT NOTE ADULT - SUBJECTIVE AND OBJECTIVE BOX
[facility]  RACHELLE Manzanares S. Shah, Y. Patel, G. 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 Patient states he was recently started on Skyrizi and since then has been experiencing increased weakness, fatigue, bilateral LE edema and fever last night (temp max 102.5) which was relieved by Tylenol. Patient and spouse present to the ED for further evaluation. Denies any chest pain, n/v/d or recent sick contacts. (2023 12:37) Patient seen and examined at bedside this afternoon in the ER, wife at bedside. Patient and wife confirm history. They state [place] [facility] [place]  and he had also tested positive at that time for season coronavirus. He reports he has been tapering his prednisone. He has increased urinary frequency as he is on lasix, otherwise denies dysuria or any other urinary symptoms. Denies nausea, vomiting or diarrhea.   ROS: 14 point review of systems completed, pertinent positives and negatives as per HPI.    Allergies: No Known Allergies  PMH -- Cutaneous lymphoma, Erythroderm  PSH -- No significant past surgical history  FH -- noncontributory   Social History -- denies tobacco, alcohol or illicit drug use    Physical Exam--  Vital Signs Last 24 Hrs  T(F): 98.6 (2023 14:01), Max: 99.6 (2023 08:16)  HR: 92 (2023 14:01) (92 - 122)  BP: 120/63 (2023 14:01) (120/63 - 151/72)  RR: 18 (2023 14:01) (15 - 20)  SpO2: 95% (2023 14:01) (91% - 97%)  General: no acute distress  HEENT: NC/AT, EOMI, anicteric, neck supple  Lungs: Clear bilaterally without rales, wheezing or rhonchi  Heart: S1, S2 present, RRR. No murmur, rub or gallop.  Abdomen: Soft. Nondistended. Nontender. BS present.   Neuro: AAOx3, no obvious focal deficits   Extremities: No cyanosis. B/l LE pitting edema.   Skin: Warm. Dry. Generalized severe erythematous rash  Psychiatric: Appropriate affect and mood for situation.   Lines: PIV    Laboratory & Imaging Data--  CBC:                       12.8   13.23 )-----------( 262      ( 2023 09:47 )             38.9     WBC Count: 13.23 K/uL (23 @ 09:47)    CMP:     134<L>  |  97  |  21  ----------------------------<  101<H>  4.2   |  27  |  1.05    Ca    9.3      2023 09:47    TPro  6.0  /  Alb  3.3  /  TBili  0.6  /  DBili  x   /  AST  18  /  ALT  32  /  AlkPhos  61      LIVER FUNCTIONS - ( 2023 09:47 )  Alb: 3.3 g/dL / Pro: 6.0 g/dL / ALK PHOS: 61 U/L / ALT: 32 U/L / AST: 18 U/L / GGT: x           Urinalysis Basic - ( 2023 09:58 )  Color: Light Yellow / Appearance: Clear / S.012 / pH: x  Gluc: x / Ketone: Negative  / Bili: Negative / Urobili: Negative   Blood: x / Protein: Trace / Nitrite: Negative   Leuk Esterase: Negative / RBC: x / WBC x   Sq Epi: x / Non Sq Epi: x / Bacteria: x    Microbiology: reviewed  Respiratory Viral Panel with COVID-19 by RICKY (23 @ 09:46)    Rapid RVP Result: Detected   SARS-CoV-2: NotDetec: This Respiratory Panel uses polymerase chain reaction (PCR) to detect for  adenovirus; coronavirus (HKU1, NL63, 229E, OC43); human metapneumovirus  (hMPV); human enterovirus/rhinovirus (Entero/RV); influenza A; influenza  A/H1; influenza A/H3; influenza A/H1-2009; influenza B; parainfluenza  viruses 1, 2, 3, 4; respiratory syncytial virus; Mycoplasma pneumoniae;  Chlamydophila pneumoniae; and SARS-CoV-2.   Coronavirus (229E,HKU1,NL63,OC43): Detected    Radiology--reviewed  < from: Xray Chest 1 View AP/PA (23 @ 10:02) >  IMPRESSION:  No evidence of acute pulmonary disease.    < end of copied text >    Active Medications--  acetaminophen     Tablet .. 650 milliGRAM(s) Oral every 6 hours PRN  aluminum hydroxide/magnesium hydroxide/simethicone Suspension 30 milliLiter(s) Oral every 4 hours PRN  enoxaparin Injectable 40 milliGRAM(s) SubCutaneous every 24 hours  melatonin 3 milliGRAM(s) Oral at bedtime PRN  ondansetron Injectable 4 milliGRAM(s) IV Push every 8 hours PRN  pantoprazole    Tablet 40 milliGRAM(s) Oral before breakfast  predniSONE   Tablet 20 milliGRAM(s) Oral daily  simvastatin 40 milliGRAM(s) Oral at bedtime  tamsulosin 0.8 milliGRAM(s) Oral at bedtime  valACYclovir 500 milliGRAM(s) Oral daily    Current Antimicrobials:   valACYclovir 500 milliGRAM(s) Oral daily    Prior/Completed Antimicrobials:  cefepime   IVPB  vancomycin  IVPB.

## 2023-07-14 NOTE — ED ADULT NURSE NOTE - OBJECTIVE STATEMENT
78 y old male with PMH of HLD, erythroderm and cutaneous lymphoma presents to the ED from home c/o fever since last night. Pt endorsing fever  of tamx 102.5. Pt also reports urinary frequency. Pt denies HA, fever, chills, CP, SOB, abd pain, n/v/d. Pt A&O x 4, ambulatory. Respirations nonlabored on RA. Abdomen soft and nontender. Generalized erythema noted. +2 edema noted to bilateral lower extremities. Skin warm, dry and intact. Stretcher locked in lowest position, side rails up and call bell in reach.

## 2023-07-14 NOTE — PATIENT PROFILE ADULT - FALL HARM RISK - HARM RISK INTERVENTIONS
Assistance with ambulation/Assistance OOB with selected safe patient handling equipment/Communicate Risk of Fall with Harm to all staff/Discuss with provider need for PT consult/Monitor gait and stability/Reinforce activity limits and safety measures with patient and family/Tailored Fall Risk Interventions/Visual Cue: Yellow wristband and red socks/Bed in lowest position, wheels locked, appropriate side rails in place/Call bell, personal items and telephone in reach/Instruct patient to call for assistance before getting out of bed or chair/Non-slip footwear when patient is out of bed/[place] to call system/Physically safe environment - no spills, clutter or unnecessary equipment/Purposeful Proactive Rounding/Room/bathroom lighting operational, light cord in reach

## 2023-07-14 NOTE — H&P ADULT - NSHPREVIEWOFSYSTEMS_GEN_ALL_CORE
GENERAL: no weakness, + fever/chills, no weight loss/gain  EYES/ENT: No visual changes, no vertigo or throat pain, + bilateral eye irritation   NECK: No pain or stiffness   RESPIRATORY: no cough, no wheezing, no hemoptysis, no dyspnea, + shortness of breath  CARDIOVASCULAR: no chest pain or palpitations  GASTROINTESTINAL: no n/v/d, no abdominal or epigastric pain  GENITOURINARY: no dysuria, no frequency, no nocturia, no hematuria  MUSCULOSKELETAL: no trauma, no sprain/strain, no myalgias, no arthralgias, no fracture  NEUROLOGICAL: no HA, no dizziness, no weakness, no numbness  SKIN: No itching, rashes

## 2023-07-14 NOTE — ED PROVIDER NOTE - CLINICAL SUMMARY MEDICAL DECISION MAKING FREE TEXT BOX
Caren Kolb MD  78-year-old male with past medical history of primary cutaneous B-cell lymphoma (single nodule that was treated with radiation) and diffuse erythroderma (initial rash starting February 2023, worsened with May 2023) currently on prednisone taper with first dose Skyrizi 7/11 presents to the emergency department complaining of fever that started last night.  Patient reports Tmax of 102, took Tylenol last night but did not take any antipyretics this morning.  He reports that since starting Skyrizi he has felt unwell with increased fatigue and myalgias.  He also reports that he has had worsened lower extremity edema.  He denies headaches, dizziness, chest pain, shortness of breath, cough, sick contacts, abdominal pain, nausea, vomiting, diarrhea or dysuria. on exam erythroderma of entire body, inflammation of eyelids, otherwise WNL. concern for sepsis, PLan: labs, CXR, EKG, reassess.

## 2023-07-14 NOTE — ED PROVIDER NOTE - ATTENDING CONTRIBUTION TO CARE
I performed a history and physical exam of the patient and discussed their management with the ACP. I reviewed the ACP's note and agree with the documented findings and plan of care.  Caren Kolb MD

## 2023-07-14 NOTE — PATIENT PROFILE ADULT - HOW PATIENT ADDRESSED, PROFILE
No pertinent interval history. Vital signs remained stable overnight. Received no calls by RN overnight.
Pt resting comfortably. Pending CM/ SW for placement to BRIAN.
Ayaz

## 2023-07-14 NOTE — H&P ADULT - NSHPPHYSICALEXAM_GEN_ALL_CORE
Vital Signs Last 24 Hrs  T(C): 37.1 (14 Jul 2023 09:33), Max: 37.6 (14 Jul 2023 08:16)  T(F): 98.7 (14 Jul 2023 09:33), Max: 99.6 (14 Jul 2023 08:16)  HR: 110 (14 Jul 2023 09:33) (110 - 122)  BP: 144/64 (14 Jul 2023 09:33) (144/64 - 151/72)  BP(mean): --  RR: 15 (14 Jul 2023 09:33) (15 - 20)  SpO2: 97% (14 Jul 2023 09:34) (91% - 97%)    Parameters below as of 14 Jul 2023 09:34  Patient On (Oxygen Delivery Method): nasal cannula  O2 Flow (L/min): 2 Vital Signs Last 24 Hrs  T(C): 37.1 (14 Jul 2023 09:33), Max: 37.6 (14 Jul 2023 08:16)  T(F): 98.7 (14 Jul 2023 09:33), Max: 99.6 (14 Jul 2023 08:16)  HR: 110 (14 Jul 2023 09:33) (110 - 122)  BP: 144/64 (14 Jul 2023 09:33) (144/64 - 151/72)  BP(mean): --  RR: 15 (14 Jul 2023 09:33) (15 - 20)  SpO2: 97% (14 Jul 2023 09:34) (91% - 97%)    Parameters below as of 14 Jul 2023 09:34  Patient On (Oxygen Delivery Method): nasal cannula  O2 Flow (L/min): 2    PHYSICAL EXAM:  GENERAL: NAD, well-developed, comfortable  HEAD:  Atraumatic, Normocephalic  EYES: + bilateral blepharitis  NECK: Supple, No JVD  CHEST/LUNG: Clear to auscultation bilaterally; No wheeze  HEART: Regular rate and rhythm; No murmurs, rubs, or gallops  ABDOMEN: Soft, Nontender, Nondistended; Bowel sounds present  NEURO: AAOx3, no focal weakness, 5/5 b/l extremity strength, b/l knee no arthritis, no effusion   EXTREMITIES:  2+ Peripheral Pulses, No clubbing, cyanosis, bilateral LE edema  SKIN: generalized severe erythema

## 2023-07-14 NOTE — ED ADULT NURSE NOTE - NSFALLUNIVINTERV_ED_ALL_ED
Bed/Stretcher in lowest position, wheels locked, appropriate side rails in place/Call bell, personal items and telephone in reach/Instruct patient to call for assistance before getting out of bed/chair/stretcher/Non-slip footwear applied when patient is off stretcher/Art to call system/Physically safe environment - no spills, clutter or unnecessary equipment/Purposeful proactive rounding/Room/bathroom lighting operational, light cord in reach

## 2023-07-14 NOTE — ED ADULT NURSE REASSESSMENT NOTE - NS ED NURSE REASSESS COMMENT FT1
Pt endorsing increasing SOB and pain on inspiration. O2 sat 94% on RA, pt placed on 2L nasal cannula. Pt states "I am concerned I have a blood clot. I started a new medication on Tuesday and it has a lot of side effects." SHUBHAM Garcia made aware.

## 2023-07-14 NOTE — H&P ADULT - NSHPLABSRESULTS_GEN_ALL_CORE
LABS:                        12.8   13.23 )-----------( 262      ( 2023 09:47 )             38.9     07-14    134<L>  |  97  |  21  ----------------------------<  101<H>  4.2   |  27  |  1.05    Ca    9.3      2023 09:47    TPro  6.0  /  Alb  3.3  /  TBili  0.6  /  DBili  x   /  AST  18  /  ALT  32  /  AlkPhos  61  07-14    PT/INR - ( 2023 09:47 )   PT: 12.7 sec;   INR: 1.10 ratio         PTT - ( 2023 09:47 )  PTT:28.5 sec  CAPILLARY BLOOD GLUCOSE            Urinalysis Basic - ( 2023 09:58 )    Color: Light Yellow / Appearance: Clear / S.012 / pH: x  Gluc: x / Ketone: Negative  / Bili: Negative / Urobili: Negative   Blood: x / Protein: Trace / Nitrite: Negative   Leuk Esterase: Negative / RBC: x / WBC x   Sq Epi: x / Non Sq Epi: x / Bacteria: x        RADIOLOGY & ADDITIONAL TESTS:    < from: Xray Chest 1 View AP/PA (23 @ 10:02) >    No evidence of acute pulmonary disease.    < end of copied text >        Imaging Personally Reviewed:  [x] YES  [ ] NO    Consultant(s) Notes Reviewed:  [x] YES  [ ] NO    Care Discussed with Consultants/Other Providers [x] YES  [ ] NO

## 2023-07-14 NOTE — CONSULT NOTE ADULT - ASSESSMENT
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 Patient states he was recently started on Skyrizi and since then has been experiencing increased weakness, fatigue, bilateral LE edema and fever last night (temp max 102.5) which was relieved by Tylenol.     Sepsis vs sirs  Possible new viral URI with seasonal coronavirus vs ongoing shedding from recent infection in June  Leukocytosis -- may be component reactive d/t steroid for diffuse erythroderma  - RVP +seasonal coronavirus (not COVID)  - UA negative for pyuria  - CXR with no infiltrate/consolidation  - s/p vancomycin + cefepime in ER x1     Recommendations:  Follow cultures in process   Can monitor off antibiotics for now   monitor temps/WBC  Diuresis per primary team  elevate lower extremities     Over the weekend Dr. Maulik Koroma will be covering for our group. If you have any questions, concerns or new micro data, please reach out to them at [phone].    Patrick Anderson M.D.  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 Patient states he was recently started on Skyrizi and since then has been experiencing increased weakness, fatigue, bilateral LE edema and fever last night (temp max 102.5) which was relieved by Tylenol.     Sepsis vs sirs  Possible new viral URI with seasonal coronavirus vs ongoing shedding from recent infection in June  Leukocytosis -- may be component reactive d/t steroid for diffuse erythroderma  Fever may be due to recent skyrizi   - RVP +seasonal coronavirus (not COVID)  - UA negative for pyuria  - CXR with no infiltrate/consolidation  - s/p vancomycin + cefepime in ER x1     Recommendations:  Follow cultures in process   Can monitor off antibiotics for now   monitor temps/WBC  Diuresis per primary team  elevate lower extremities     Over the weekend Dr. Maulik Koroma will be covering for our group. If you have any questions, concerns or new micro data, please reach out to them at [phone].    Patrick Anderson M.D.  [facility], Division of Infectious Diseases  [phone]  After 5pm on weekdays and all day on weekends - please call [phone]

## 2023-07-14 NOTE — H&P ADULT - ASSESSMENT
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 Presents to [facility] for worsening fever.    # Fever r/o Sepsis:  - + leukocytosis, afebrile while in ED  - CXR with no evidence of acute pulmonary disease.  - Coronavirsu/RVP positive  - s/p Cefepime/Vanco x1 in ED  - Follow up BCx/UCx  - ID consult pending (called)    # Diffuse Erythroderma:  - C/w Prednisone taper -> 20MG till Monday     # HLD:  - C/w Statin    # Herpes:  - C/w Valtrex    # DVT ppx:  - Lovenox    [facility]  [phone]   minimum assist (75% patients effort)

## 2023-07-15 ENCOUNTER — NON-APPOINTMENT (OUTPATIENT)
Age: 78
End: 2023-07-15

## 2023-07-15 LAB
ALBUMIN SERPL ELPH-MCNC: 2.7 G/DL — LOW (ref 3.3–5)
ALP SERPL-CCNC: 52 U/L — SIGNIFICANT CHANGE UP (ref 40–120)
ALT FLD-CCNC: 29 U/L — SIGNIFICANT CHANGE UP (ref 10–45)
ANION GAP SERPL CALC-SCNC: 12 MMOL/L — SIGNIFICANT CHANGE UP (ref 5–17)
AST SERPL-CCNC: 15 U/L — SIGNIFICANT CHANGE UP (ref 10–40)
BILIRUB SERPL-MCNC: 0.4 MG/DL — SIGNIFICANT CHANGE UP (ref 0.2–1.2)
BUN SERPL-MCNC: 18 MG/DL — SIGNIFICANT CHANGE UP (ref 7–23)
CALCIUM SERPL-MCNC: 8.8 MG/DL — SIGNIFICANT CHANGE UP (ref 8.4–10.5)
CHLORIDE SERPL-SCNC: 101 MMOL/L — SIGNIFICANT CHANGE UP (ref 96–108)
CO2 SERPL-SCNC: 26 MMOL/L — SIGNIFICANT CHANGE UP (ref 22–31)
CREAT SERPL-MCNC: 1.11 MG/DL — SIGNIFICANT CHANGE UP (ref 0.5–1.3)
CULTURE RESULTS: NO GROWTH — SIGNIFICANT CHANGE UP
EGFR: 68 ML/MIN/1.73M2 — SIGNIFICANT CHANGE UP
GLUCOSE SERPL-MCNC: 102 MG/DL — HIGH (ref 70–99)
HCT VFR BLD CALC: 36.7 % — LOW (ref 39–50)
HCV AB S/CO SERPL IA: 0.05 S/CO — SIGNIFICANT CHANGE UP (ref 0–0.99)
HCV AB SERPL-IMP: SIGNIFICANT CHANGE UP
HGB BLD-MCNC: 11.8 G/DL — LOW (ref 13–17)
MCHC RBC-ENTMCNC: 30.7 PG — SIGNIFICANT CHANGE UP (ref 27–34)
MCHC RBC-ENTMCNC: 32.2 GM/DL — SIGNIFICANT CHANGE UP (ref 32–36)
MCV RBC AUTO: 95.6 FL — SIGNIFICANT CHANGE UP (ref 80–100)
MRSA PCR RESULT.: SIGNIFICANT CHANGE UP
NRBC # BLD: 0 /100 WBCS — SIGNIFICANT CHANGE UP (ref 0–0)
PLATELET # BLD AUTO: 231 K/UL — SIGNIFICANT CHANGE UP (ref 150–400)
POTASSIUM SERPL-MCNC: 4.3 MMOL/L — SIGNIFICANT CHANGE UP (ref 3.5–5.3)
POTASSIUM SERPL-SCNC: 4.3 MMOL/L — SIGNIFICANT CHANGE UP (ref 3.5–5.3)
PROT SERPL-MCNC: 5.2 G/DL — LOW (ref 6–8.3)
RBC # BLD: 3.84 M/UL — LOW (ref 4.2–5.8)
RBC # FLD: 12.9 % — SIGNIFICANT CHANGE UP (ref 10.3–14.5)
S AUREUS DNA NOSE QL NAA+PROBE: SIGNIFICANT CHANGE UP
SODIUM SERPL-SCNC: 139 MMOL/L — SIGNIFICANT CHANGE UP (ref 135–145)
SPECIMEN SOURCE: SIGNIFICANT CHANGE UP
WBC # BLD: 13.1 K/UL — HIGH (ref 3.8–10.5)
WBC # FLD AUTO: 13.1 K/UL — HIGH (ref 3.8–10.5)

## 2023-07-15 RX ORDER — CHLORHEXIDINE GLUCONATE 213 G/1000ML
1 SOLUTION TOPICAL DAILY
Refills: 0 | Status: DISCONTINUED | OUTPATIENT
Start: 2023-07-15 | End: 2023-07-24

## 2023-07-15 RX ADMIN — Medication 650 MILLIGRAM(S): at 04:23

## 2023-07-15 RX ADMIN — ENOXAPARIN SODIUM 40 MILLIGRAM(S): 100 INJECTION SUBCUTANEOUS at 05:47

## 2023-07-15 RX ADMIN — VALACYCLOVIR 500 MILLIGRAM(S): 500 TABLET, FILM COATED ORAL at 11:55

## 2023-07-15 RX ADMIN — Medication 25 MILLIGRAM(S): at 00:01

## 2023-07-15 RX ADMIN — PANTOPRAZOLE SODIUM 40 MILLIGRAM(S): 20 TABLET, DELAYED RELEASE ORAL at 05:47

## 2023-07-15 RX ADMIN — Medication 20 MILLIGRAM(S): at 05:47

## 2023-07-15 RX ADMIN — TAMSULOSIN HYDROCHLORIDE 0.8 MILLIGRAM(S): 0.4 CAPSULE ORAL at 21:08

## 2023-07-15 RX ADMIN — Medication 650 MILLIGRAM(S): at 05:56

## 2023-07-15 RX ADMIN — SIMVASTATIN 40 MILLIGRAM(S): 20 TABLET, FILM COATED ORAL at 21:09

## 2023-07-15 NOTE — PROGRESS NOTE ADULT - ASSESSMENT
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     SIRS/FEVER/ERYTHRODERMA  Presentation with pronounced scaling of skin very suggestive of erythrodermic psoriasis and unclear etiology of acute  leading to hospitalization  very curious history of Primary Cutaneous B-cell Lymphoma and now RVP+ for seasonal coronavirus   Infection in June was SARS-CoV-2  Leukocytosis -- may be component reactive d/t steroid for diffuse erythroderma  Fever may be due to recent skyrizi as thisis described and this is a humanized monoclonal antibody, seasonal coronavirus, but low suspicion for bacterial sepsis     Recommendations:  -monitor off abx  -continued steroids    Thank you for consulting us and involving us in the management of this most interesting and challenging case.  We will follow along in the care of this patient. Please call us at [phone] or text me directly on my cell# at [phone] with any concerns.       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     SIRS/FEVER/ERYTHRODERMA  Presentation with pronounced scaling of skin very suggestive of erythrodermic psoriasis and unclear etiology of acute  leading to hospitalization and fever  very curious history of Primary Cutaneous B-cell Lymphoma   Infection in June 2023 at [facility] RVP+ for seasonal coronavirus   June 2022 was SARS-CoV-2 here at [facility] ER  Leukocytosis -- may be component reactive d/t steroid for diffuse erythroderma  Fever may be due to recent skyrizi as this is a humanized monoclonal antibody, seasonal coronavirus, but low suspicion for bacterial sepsis     Recommendations:  -monitor off abx  -continued steroids    Thank you for consulting us and involving us in the management of this most interesting and challenging case.  We will follow along in the care of this patient. Please call us at [phone] or text me directly on my cell# at [phone] with any concerns.

## 2023-07-15 NOTE — PROGRESS NOTE ADULT - SUBJECTIVE AND OBJECTIVE BOX
[facility] DIVISION of INFECTIOUS DISEASE  Maulik Koroma MD PhD, Kriss Robledo MD, Tiarra Mata MD, Patrick Anderson MD, Evens Escobar MD  and providing coverage with Vahe Wall MD  Providing Infectious Disease Consultations at [facility], [facility], [facility], [facility]'s    Office# [phone] to schedule follow up appointments  Answering Service for urgent calls or New Consults [phone]  Cell# to text for urgent issues Maulik Koroma [phone]     infectious diseases progress note:    ZAHRA LONG is a 78y y. o. Male patient    Overnight and events of the last 24hrs reviewed    Allergies    No Known Allergies    Intolerances        ANTIBIOTICS/RELEVANT:  antimicrobials  valACYclovir 500 milliGRAM(s) Oral daily    immunologic:    OTHER:  acetaminophen     Tablet .. 650 milliGRAM(s) Oral every 6 hours PRN  aluminum hydroxide/magnesium hydroxide/simethicone Suspension 30 milliLiter(s) Oral every 4 hours PRN  chlorhexidine 2% Cloths 1 Application(s) Topical daily  diphenhydrAMINE 25 milliGRAM(s) Oral every 4 hours PRN  enoxaparin Injectable 40 milliGRAM(s) SubCutaneous every 24 hours  melatonin 5 milliGRAM(s) Oral at bedtime PRN  ondansetron Injectable 4 milliGRAM(s) IV Push every 8 hours PRN  pantoprazole    Tablet 40 milliGRAM(s) Oral before breakfast  predniSONE   Tablet 20 milliGRAM(s) Oral daily  simvastatin 40 milliGRAM(s) Oral at bedtime  tamsulosin 0.8 milliGRAM(s) Oral at bedtime      Objective:  Vital Signs Last 24 Hrs  T(C): 36.4 (15 Jul 2023 11:04), Max: 37.9 (15 Jul 2023 04:00)  T(F): 97.6 (15 Jul 2023 11:04), Max: 100.2 (15 Jul 2023 04:00)  HR: 90 (15 Jul 2023 11:04) (86 - 112)  BP: 139/73 (15 Jul 2023 11:04) (138/70 - 155/68)  BP(mean): --  RR: 18 (15 Jul 2023 11:04) (18 - 18)  SpO2: 94% (15 Jul 2023 11:04) (94% - 97%)    Parameters below as of 15 Jul 2023 11:04  Patient On (Oxygen Delivery Method): room air        T(C): 36.4 (07-15-23 @ 11:04), Max: 37.9 (07-15-23 @ 04:00)  T(C): 36.4 (07-15-23 @ 11:04), Max: 37.9 (07-15-23 @ 04:00)  T(C): 36.4 (07-15-23 @ 11:04), Max: 37.9 (07-15-23 @ 04:00)    PHYSICAL EXAM:  HEENT: NC atraumatic, blepharitis  Neck: supple  Respiratory: no accessory muscle use, breathing comfortably  Cardiovascular: distant  Gastrointestinal: normal appearing, nondistended  Extremities: no clubbing, no cyanosis,  Skin: diffuse scaling erythema of body      LABS:                          11.8   13.10 )-----------( 231      ( 15 Jul 2023 06:02 )             36.7       WBC  13.10 07-15 @ 06:02  13.23 07-14 @ 09:47      07-15    139  |  101  |  18  ----------------------------<  102<H>  4.3   |  26  |  1.11    Ca    8.8      15 Jul 2023 06:01    TPro  5.2<L>  /  Alb  2.7<L>  /  TBili  0.4  /  DBili  x   /  AST  15  /  ALT  29  /  AlkPhos  52  07-15      Creatinine: 1.11 mg/dL (07-15-23 @ 06:01)  Creatinine: 1.05 mg/dL (07-14-23 @ 09:47)      PT/INR - ( 14 Jul 2023 09:47 )   PT: 12.7 sec;   INR: 1.10 ratio         PTT - ( 14 Jul 2023 09:47 )  PTT:28.5 sec  Urinalysis Basic - ( 15 Jul 2023 06:01 )    Color: x / Appearance: x / SG: x / pH: x  Gluc: 102 mg/dL / Ketone: x  / Bili: x / Urobili: x   Blood: x / Protein: x / Nitrite: x   Leuk Esterase: x / RBC: x / WBC x   Sq Epi: x / Non Sq Epi: x / Bacteria: x            INFLAMMATORY MARKERS      MICROBIOLOGY:    Coronavirus (229E,HKU1,NL63,OC43): Detected (07.14.23 @ 09:46)    RADIOLOGY & ADDITIONAL STUDIES:   [facility] DIVISION of INFECTIOUS DISEASE  Maulik Koroma MD PhD, Kriss Robledo MD, Tiarra Mata MD, Patrick Anderson MD, Evens Escobar MD  and providing coverage with Vahe Wall MD  Providing Infectious Disease Consultations at [facility], [facility], [facility], [facility]'s    Office# [phone] to schedule follow up appointments  Answering Service for urgent calls or New Consults [phone]  Cell# to text for urgent issues Maulik Koroma [phone]     infectious diseases progress note:    ZAHRA LONG is a 78y y. o. Male patient    Overnight and events of the last 24hrs reviewed    Allergies    No Known Allergies    Intolerances        ANTIBIOTICS/RELEVANT:  antimicrobials  valACYclovir 500 milliGRAM(s) Oral daily    immunologic:    OTHER:  acetaminophen     Tablet .. 650 milliGRAM(s) Oral every 6 hours PRN  aluminum hydroxide/magnesium hydroxide/simethicone Suspension 30 milliLiter(s) Oral every 4 hours PRN  chlorhexidine 2% Cloths 1 Application(s) Topical daily  diphenhydrAMINE 25 milliGRAM(s) Oral every 4 hours PRN  enoxaparin Injectable 40 milliGRAM(s) SubCutaneous every 24 hours  melatonin 5 milliGRAM(s) Oral at bedtime PRN  ondansetron Injectable 4 milliGRAM(s) IV Push every 8 hours PRN  pantoprazole    Tablet 40 milliGRAM(s) Oral before breakfast  predniSONE   Tablet 20 milliGRAM(s) Oral daily  simvastatin 40 milliGRAM(s) Oral at bedtime  tamsulosin 0.8 milliGRAM(s) Oral at bedtime      Objective:  Vital Signs Last 24 Hrs  T(C): 36.4 (15 Jul 2023 11:04), Max: 37.9 (15 Jul 2023 04:00)  T(F): 97.6 (15 Jul 2023 11:04), Max: 100.2 (15 Jul 2023 04:00)  HR: 90 (15 Jul 2023 11:04) (86 - 112)  BP: 139/73 (15 Jul 2023 11:04) (138/70 - 155/68)  BP(mean): --  RR: 18 (15 Jul 2023 11:04) (18 - 18)  SpO2: 94% (15 Jul 2023 11:04) (94% - 97%)    Parameters below as of 15 Jul 2023 11:04  Patient On (Oxygen Delivery Method): room air        T(C): 36.4 (07-15-23 @ 11:04), Max: 37.9 (07-15-23 @ 04:00)  T(C): 36.4 (07-15-23 @ 11:04), Max: 37.9 (07-15-23 @ 04:00)  T(C): 36.4 (07-15-23 @ 11:04), Max: 37.9 (07-15-23 @ 04:00)    PHYSICAL EXAM:  HEENT: NC atraumatic, blepharitis  Neck: supple  Respiratory: no accessory muscle use, breathing comfortably  Cardiovascular: distant  Gastrointestinal: normal appearing, nondistended  Extremities: no clubbing, no cyanosis,  Skin: diffuse scaling erythema of body      LABS:                          11.8   13.10 )-----------( 231      ( 15 Jul 2023 06:02 )             36.7       WBC  13.10 07-15 @ 06:02  13.23 07-14 @ 09:47      07-15    139  |  101  |  18  ----------------------------<  102<H>  4.3   |  26  |  1.11    Ca    8.8      15 Jul 2023 06:01    TPro  5.2<L>  /  Alb  2.7<L>  /  TBili  0.4  /  DBili  x   /  AST  15  /  ALT  29  /  AlkPhos  52  07-15      Creatinine: 1.11 mg/dL (07-15-23 @ 06:01)  Creatinine: 1.05 mg/dL (07-14-23 @ 09:47)      PT/INR - ( 14 Jul 2023 09:47 )   PT: 12.7 sec;   INR: 1.10 ratio         PTT - ( 14 Jul 2023 09:47 )  PTT:28.5 sec  Urinalysis Basic - ( 15 Jul 2023 06:01 )    Color: x / Appearance: x / SG: x / pH: x  Gluc: 102 mg/dL / Ketone: x  / Bili: x / Urobili: x   Blood: x / Protein: x / Nitrite: x   Leuk Esterase: x / RBC: x / WBC x   Sq Epi: x / Non Sq Epi: x / Bacteria: x            INFLAMMATORY MARKERS      MICROBIOLOGY:    Coronavirus (229E,HKU1,NL63,OC43): Detected (07.14.23 @ 09:46)    SARS-CoV-2: Detected (06.14.22 @ 09:37)        RADIOLOGY & ADDITIONAL STUDIES:

## 2023-07-15 NOTE — PROGRESS NOTE ADULT - ASSESSMENT
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 Presents to [facility] for worsening fever.    # Fever r/o viral URI:  - CXR with no evidence of acute pulmonary disease.  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 Presents to [facility] for worsening fever.    # Fever r/o viral URI:  - CXR with no evidence of acute pulmonary disease.  - RVP +seasonal coronavirus (not COVID)  - Monitor temps/WBC  - S/p Cefepime/Vanco x1 in ED-> monitor off abx per ID  - Follow up BCx/UCx  - Supportive care  - ID following    # Diffuse Erythroderma:  - C/w Prednisone taper -> 20MG till Monday     # HLD:  - C/w Statin    # Herpes:  - C/w Valtrex    # DVT ppx:  - Lovenox    [facility]  [phone]

## 2023-07-15 NOTE — PROGRESS NOTE ADULT - SUBJECTIVE AND OBJECTIVE BOX
SUBJECTIVE / OVERNIGHT EVENTS:      Patient seen and examined at bedside. Low grade fever overnight-> treated with Tylenol. Resting comfortably in bed    --------------------------------------------------------------------------------------------  LABS:                        11.8   13.10 )-----------( 231      ( 15 Jul 2023 06:02 )             36.7     07-15    139  |  101  |  18  ----------------------------<  102<H>  4.3   |  26  |  1.11    Ca    8.8      15 Jul 2023 06:01    TPro  5.2<L>  /  Alb  2.7<L>  /  TBili  0.4  /  DBili  x   /  AST  15  /  ALT  29  /  AlkPhos  52  07-15    PT/INR - ( 14 Jul 2023 09:47 )   PT: 12.7 sec;   INR: 1.10 ratio         PTT - ( 14 Jul 2023 09:47 )  PTT:28.5 sec  CAPILLARY BLOOD GLUCOSE            Urinalysis Basic - ( 15 Jul 2023 06:01 )    Color: x / Appearance: x / SG: x / pH: x  Gluc: 102 mg/dL / Ketone: x  / Bili: x / Urobili: x   Blood: x / Protein: x / Nitrite: x   Leuk Esterase: x / RBC: x / WBC x   Sq Epi: x / Non Sq Epi: x / Bacteria: x        RADIOLOGY & ADDITIONAL TESTS:< from: Xray Chest 1 View AP/PA (07.14.23 @ 10:02) >  IMPRESSION:  No evidence of acute pulmonary disease.    --- End of Report ---    < end of copied text >      Imaging Personally Reviewed:  [x] YES  [ ] NO    Consultant(s) Notes Reviewed:  [x] YES  [ ] NO    MEDICATIONS  (STANDING):  enoxaparin Injectable 40 milliGRAM(s) SubCutaneous every 24 hours  pantoprazole    Tablet 40 milliGRAM(s) Oral before breakfast  predniSONE   Tablet 20 milliGRAM(s) Oral daily  simvastatin 40 milliGRAM(s) Oral at bedtime  tamsulosin 0.8 milliGRAM(s) Oral at bedtime  valACYclovir 500 milliGRAM(s) Oral daily    MEDICATIONS  (PRN):  acetaminophen     Tablet .. 650 milliGRAM(s) Oral every 6 hours PRN Temp greater or equal to 38C (100.4F), Mild Pain (1 - 3)  aluminum hydroxide/magnesium hydroxide/simethicone Suspension 30 milliLiter(s) Oral every 4 hours PRN Dyspepsia  diphenhydrAMINE 25 milliGRAM(s) Oral every 4 hours PRN Rash and/or Itching  melatonin 5 milliGRAM(s) Oral at bedtime PRN Insomnia  ondansetron Injectable 4 milliGRAM(s) IV Push every 8 hours PRN Nausea and/or Vomiting      Care Discussed with Consultants/Other Providers [x] YES  [ ] NO    Vital Signs Last 24 Hrs  T(C): 37.2 (15 Jul 2023 05:56), Max: 37.9 (15 Jul 2023 04:00)  T(F): 99 (15 Jul 2023 05:56), Max: 100.2 (15 Jul 2023 04:00)  HR: 101 (15 Jul 2023 04:00) (86 - 122)  BP: 138/70 (15 Jul 2023 04:00) (120/63 - 155/68)  BP(mean): --  RR: 18 (15 Jul 2023 04:00) (15 - 20)  SpO2: 95% (15 Jul 2023 04:00) (91% - 97%)    Parameters below as of 15 Jul 2023 04:00  Patient On (Oxygen Delivery Method): nasal cannula  O2 Flow (L/min): 2    I&O's Summary      PHYSICAL EXAM:  GENERAL: NAD, well-developed, comfortable  HEAD:  Atraumatic, Normocephalic  EYES: + bilateral blepharitis  NECK: Supple, No JVD  CHEST/LUNG: Clear to auscultation bilaterally; No wheeze  HEART: Regular rate and rhythm; No murmurs, rubs, or gallops  ABDOMEN: Soft, Nontender, Nondistended; Bowel sounds present  NEURO: AAOx3, no focal weakness, 5/5 b/l extremity strength, b/l knee no arthritis, no effusion   EXTREMITIES:  2+ Peripheral Pulses, No clubbing, cyanosis, bilateral LE edema  SKIN: generalized severe erythema

## 2023-07-16 LAB
ANION GAP SERPL CALC-SCNC: 11 MMOL/L — SIGNIFICANT CHANGE UP (ref 5–17)
BUN SERPL-MCNC: 21 MG/DL — SIGNIFICANT CHANGE UP (ref 7–23)
CALCIUM SERPL-MCNC: 8.9 MG/DL — SIGNIFICANT CHANGE UP (ref 8.4–10.5)
CHLORIDE SERPL-SCNC: 102 MMOL/L — SIGNIFICANT CHANGE UP (ref 96–108)
CO2 SERPL-SCNC: 26 MMOL/L — SIGNIFICANT CHANGE UP (ref 22–31)
CREAT SERPL-MCNC: 1.14 MG/DL — SIGNIFICANT CHANGE UP (ref 0.5–1.3)
EGFR: 66 ML/MIN/1.73M2 — SIGNIFICANT CHANGE UP
GLUCOSE SERPL-MCNC: 99 MG/DL — SIGNIFICANT CHANGE UP (ref 70–99)
HCT VFR BLD CALC: 37 % — LOW (ref 39–50)
HGB BLD-MCNC: 12.1 G/DL — LOW (ref 13–17)
MCHC RBC-ENTMCNC: 31 PG — SIGNIFICANT CHANGE UP (ref 27–34)
MCHC RBC-ENTMCNC: 32.7 GM/DL — SIGNIFICANT CHANGE UP (ref 32–36)
MCV RBC AUTO: 94.9 FL — SIGNIFICANT CHANGE UP (ref 80–100)
NRBC # BLD: 0 /100 WBCS — SIGNIFICANT CHANGE UP (ref 0–0)
PLATELET # BLD AUTO: 205 K/UL — SIGNIFICANT CHANGE UP (ref 150–400)
POTASSIUM SERPL-MCNC: 4.2 MMOL/L — SIGNIFICANT CHANGE UP (ref 3.5–5.3)
POTASSIUM SERPL-SCNC: 4.2 MMOL/L — SIGNIFICANT CHANGE UP (ref 3.5–5.3)
RBC # BLD: 3.9 M/UL — LOW (ref 4.2–5.8)
RBC # FLD: 12.7 % — SIGNIFICANT CHANGE UP (ref 10.3–14.5)
SODIUM SERPL-SCNC: 139 MMOL/L — SIGNIFICANT CHANGE UP (ref 135–145)
WBC # BLD: 12.43 K/UL — HIGH (ref 3.8–10.5)
WBC # FLD AUTO: 12.43 K/UL — HIGH (ref 3.8–10.5)

## 2023-07-16 RX ORDER — DIPHENHYDRAMINE HCL 50 MG
25 CAPSULE ORAL AT BEDTIME
Refills: 0 | Status: DISCONTINUED | OUTPATIENT
Start: 2023-07-16 | End: 2023-07-24

## 2023-07-16 RX ORDER — HYDROXYZINE HCL 10 MG
25 TABLET ORAL
Refills: 0 | Status: DISCONTINUED | OUTPATIENT
Start: 2023-07-16 | End: 2023-07-20

## 2023-07-16 RX ADMIN — Medication 20 MILLIGRAM(S): at 06:21

## 2023-07-16 RX ADMIN — CHLORHEXIDINE GLUCONATE 1 APPLICATION(S): 213 SOLUTION TOPICAL at 11:44

## 2023-07-16 RX ADMIN — ENOXAPARIN SODIUM 40 MILLIGRAM(S): 100 INJECTION SUBCUTANEOUS at 06:21

## 2023-07-16 RX ADMIN — PANTOPRAZOLE SODIUM 40 MILLIGRAM(S): 20 TABLET, DELAYED RELEASE ORAL at 06:21

## 2023-07-16 RX ADMIN — Medication 25 MILLIGRAM(S): at 18:10

## 2023-07-16 RX ADMIN — TAMSULOSIN HYDROCHLORIDE 0.8 MILLIGRAM(S): 0.4 CAPSULE ORAL at 22:17

## 2023-07-16 RX ADMIN — SIMVASTATIN 40 MILLIGRAM(S): 20 TABLET, FILM COATED ORAL at 22:17

## 2023-07-16 RX ADMIN — VALACYCLOVIR 500 MILLIGRAM(S): 500 TABLET, FILM COATED ORAL at 11:44

## 2023-07-16 NOTE — PROGRESS NOTE ADULT - ASSESSMENT
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     SIRS/FEVER/ERYTHRODERMA  Presentation with pronounced scaling of skin very suggestive of erythrodermic psoriasis and unclear etiology of acute  leading to hospitalization and fever  very curious history of Primary Cutaneous B-cell Lymphoma   Infection in June 2023 at [facility] RVP+ for seasonal coronavirus   June 2022 was SARS-CoV-2 here at [facility] ER  Leukocytosis -- may be component reactive d/t steroid for diffuse erythroderma  Fever may be due to recent skyrizi as this is a humanized monoclonal antibody, seasonal coronavirus, but low suspicion for bacterial sepsis     Recommendations:  -monitor off abx  -continued steroids  -consider atarax 25mg PO QID for pruritis (no sig issue with still using benadryl for sleep as QHS dosing)    Thank you for consulting us and involving us in the management of this most interesting and challenging case.  We will follow along in the care of this patient. Please call us at [phone] or text me directly on my cell# at [phone] with any concerns.    Monday Dr. Patrick Anderson will be covering for our group. If you have any questions, concerns or new micro data please reach out to them at [phone]

## 2023-07-16 NOTE — PROGRESS NOTE ADULT - ASSESSMENT
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 Presents to [facility] for worsening fever.    # Fever r/o viral URI:  - CXR with no evidence of acute pulmonary disease.  - RVP +seasonal coronavirus (not COVID)  - Monitor temps/WBC  - S/p Cefepime/Vanco x1 in ED-> monitor off abx per ID  - BCx/UCx with NGTD  - Supportive care  - ID following    # Diffuse Erythroderma:  - C/w Prednisone taper -> 20MG till Monday     # HLD:  - C/w Statin    # Herpes:  - C/w Valtrex    # DVT ppx:  - Lovenox    [facility]  [phone]

## 2023-07-16 NOTE — PROGRESS NOTE ADULT - SUBJECTIVE AND OBJECTIVE BOX
SUBJECTIVE / OVERNIGHT EVENTS:      Patient seen and examined at bedside. No events noted overnight. Resting comfortably in bed. + orthostatic' s yesterday with dizziness upon standing    --------------------------------------------------------------------------------------------  LABS:                        12.1   12.43 )-----------( 205      ( 16 Jul 2023 05:54 )             37.0     07-16    139  |  102  |  21  ----------------------------<  99  4.2   |  26  |  1.14    Ca    8.9      16 Jul 2023 05:53    TPro  5.2<L>  /  Alb  2.7<L>  /  TBili  0.4  /  DBili  x   /  AST  15  /  ALT  29  /  AlkPhos  52  07-15    PT/INR - ( 14 Jul 2023 09:47 )   PT: 12.7 sec;   INR: 1.10 ratio         PTT - ( 14 Jul 2023 09:47 )  PTT:28.5 sec  CAPILLARY BLOOD GLUCOSE            Urinalysis Basic - ( 16 Jul 2023 05:53 )    Color: x / Appearance: x / SG: x / pH: x  Gluc: 99 mg/dL / Ketone: x  / Bili: x / Urobili: x   Blood: x / Protein: x / Nitrite: x   Leuk Esterase: x / RBC: x / WBC x   Sq Epi: x / Non Sq Epi: x / Bacteria: x        RADIOLOGY & ADDITIONAL TESTS:    Imaging Personally Reviewed:  [x] YES  [ ] NO    Consultant(s) Notes Reviewed:  [x] YES  [ ] NO    MEDICATIONS  (STANDING):  chlorhexidine 2% Cloths 1 Application(s) Topical daily  enoxaparin Injectable 40 milliGRAM(s) SubCutaneous every 24 hours  pantoprazole    Tablet 40 milliGRAM(s) Oral before breakfast  predniSONE   Tablet 20 milliGRAM(s) Oral daily  simvastatin 40 milliGRAM(s) Oral at bedtime  tamsulosin 0.8 milliGRAM(s) Oral at bedtime  valACYclovir 500 milliGRAM(s) Oral daily    MEDICATIONS  (PRN):  acetaminophen     Tablet .. 650 milliGRAM(s) Oral every 6 hours PRN Temp greater or equal to 38C (100.4F), Mild Pain (1 - 3)  aluminum hydroxide/magnesium hydroxide/simethicone Suspension 30 milliLiter(s) Oral every 4 hours PRN Dyspepsia  diphenhydrAMINE 25 milliGRAM(s) Oral every 4 hours PRN Rash and/or Itching  melatonin 5 milliGRAM(s) Oral at bedtime PRN Insomnia  ondansetron Injectable 4 milliGRAM(s) IV Push every 8 hours PRN Nausea and/or Vomiting      Care Discussed with Consultants/Other Providers [x] YES  [ ] NO    Vital Signs Last 24 Hrs  T(C): 37.2 (16 Jul 2023 04:45), Max: 37.2 (16 Jul 2023 04:45)  T(F): 99 (16 Jul 2023 04:45), Max: 99 (16 Jul 2023 04:45)  HR: 100 (16 Jul 2023 04:45) (90 - 109)  BP: 111/60 (16 Jul 2023 04:45) (111/60 - 157/64)  BP(mean): --  RR: 18 (16 Jul 2023 04:45) (18 - 18)  SpO2: 94% (16 Jul 2023 04:45) (94% - 94%)    Parameters below as of 16 Jul 2023 04:45  Patient On (Oxygen Delivery Method): nasal cannula  O2 Flow (L/min): 2    I&O's Summary    PHYSICAL EXAM:  GENERAL: NAD, well-developed, comfortable  HEAD:  Atraumatic, Normocephalic  EYES: + bilateral blepharitis  NECK: Supple, No JVD  CHEST/LUNG: Clear to auscultation bilaterally; No wheeze  HEART: Regular rate and rhythm; No murmurs, rubs, or gallops  ABDOMEN: Soft, Nontender, Nondistended; Bowel sounds present  NEURO: AAOx3, no focal weakness, 5/5 b/l extremity strength, b/l knee no arthritis, no effusion   EXTREMITIES:  2+ Peripheral Pulses, No clubbing, cyanosis, bilateral LE edema  SKIN: generalized severe erythema

## 2023-07-16 NOTE — PROGRESS NOTE ADULT - SUBJECTIVE AND OBJECTIVE BOX
[facility] DIV[facility] of [facility]FECTIOUS DISE[facility]  Maulik Koroma MD PhD, Kriss Robledo MD, Tiarra Mata MD, Patrick Anderson MD, Evens Escobar MD  and providing coverage with Vahe Wall MD  Providing Infectious Disease Consultations at [facility], [facility], [facility], [facility]'s    Office# [phone] to schedule follow up appointments  Answering Service for urgent calls or New Consults [phone]  Cell# to text for urgent issues Maulik Koroma [phone]     infectious diseases progress note:    ZAHRA LONG is a 78y y. o. Male patient    Overnight and events of the last 24hrs reviewed    Allergies    No Known Allergies    Intolerances        ANTIBIOTICS/RELEVANT:  antimicrobials  valACYclovir 500 milliGRAM(s) Oral daily    immunologic:    OTHER:  acetaminophen     Tablet .. 650 milliGRAM(s) Oral every 6 hours PRN  aluminum hydroxide/magnesium hydroxide/simethicone Suspension 30 milliLiter(s) Oral every 4 hours PRN  chlorhexidine 2% Cloths 1 Application(s) Topical daily  diphenhydrAMINE 25 milliGRAM(s) Oral every 4 hours PRN  enoxaparin Injectable 40 milliGRAM(s) SubCutaneous every 24 hours  melatonin 5 milliGRAM(s) Oral at bedtime PRN  ondansetron Injectable 4 milliGRAM(s) IV Push every 8 hours PRN  pantoprazole    Tablet 40 milliGRAM(s) Oral before breakfast  predniSONE   Tablet 20 milliGRAM(s) Oral daily  simvastatin 40 milliGRAM(s) Oral at bedtime  tamsulosin 0.8 milliGRAM(s) Oral at bedtime      Objective:  Vital Signs Last 24 Hrs  T(C): 36.5 (16 Jul 2023 11:18), Max: 37.2 (16 Jul 2023 04:45)  T(F): 97.7 (16 Jul 2023 11:18), Max: 99 (16 Jul 2023 04:45)  HR: 100 (16 Jul 2023 11:18) (100 - 109)  BP: 120/72 (16 Jul 2023 11:18) (111/60 - 157/64)  BP(mean): --  RR: 18 (16 Jul 2023 11:18) (18 - 18)  SpO2: 92% (16 Jul 2023 11:18) (92% - 94%)    Parameters below as of 16 Jul 2023 11:18  Patient On (Oxygen Delivery Method): room air        T(C): 36.5 (07-16-23 @ 11:18), Max: 37.9 (07-15-23 @ 04:00)  T(C): 36.5 (07-16-23 @ 11:18), Max: 37.9 (07-15-23 @ 04:00)  T(C): 36.5 (07-16-23 @ 11:18), Max: 37.9 (07-15-23 @ 04:00)    PHYSICAL EXAM:  HEENT: NC atraumatic  Neck: supple  Respiratory: no accessory muscle use, breathing comfortably  Cardiovascular: distant  Gastrointestinal: normal appearing, nondistended  Extremities: no clubbing, no cyanosis,  Skin: diffuse erythema with scaling      LABS:                          12.1   12.43 )-----------( 205      ( 16 Jul 2023 05:54 )             37.0       WBC  12.43 07-16 @ 05:54  13.10 07-15 @ 06:02  13.23 07-14 @ 09:47      07-16    139  |  102  |  21  ----------------------------<  99  4.2   |  26  |  1.14    Ca    8.9      16 Jul 2023 05:53    TPro  5.2<L>  /  Alb  2.7<L>  /  TBili  0.4  /  DBili  x   /  AST  15  /  ALT  29  /  AlkPhos  52  07-15      Creatinine: 1.14 mg/dL (07-16-23 @ 05:53)  Creatinine: 1.11 mg/dL (07-15-23 @ 06:01)  Creatinine: 1.05 mg/dL (07-14-23 @ 09:47)        Urinalysis Basic - ( 16 Jul 2023 05:53 )    Color: x / Appearance: x / SG: x / pH: x  Gluc: 99 mg/dL / Ketone: x  / Bili: x / Urobili: x   Blood: x / Protein: x / Nitrite: x   Leuk Esterase: x / RBC: x / WBC x   Sq Epi: x / Non Sq Epi: x / Bacteria: x            INFLAMMATORY MARKERS      MICROBIOLOGY:              RADIOLOGY & ADDITIONAL STUDIES:

## 2023-07-17 DIAGNOSIS — H91.90 UNSPECIFIED HEARING LOSS, UNSPECIFIED EAR: ICD-10-CM

## 2023-07-17 LAB
BASOPHILS # BLD AUTO: 0.04 K/UL — SIGNIFICANT CHANGE UP (ref 0–0.2)
BASOPHILS NFR BLD AUTO: 0.3 % — SIGNIFICANT CHANGE UP (ref 0–2)
EOSINOPHIL # BLD AUTO: 0.34 K/UL — SIGNIFICANT CHANGE UP (ref 0–0.5)
EOSINOPHIL NFR BLD AUTO: 2.7 % — SIGNIFICANT CHANGE UP (ref 0–6)
HCT VFR BLD CALC: 34.3 % — LOW (ref 39–50)
HGB BLD-MCNC: 11.3 G/DL — LOW (ref 13–17)
IMM GRANULOCYTES NFR BLD AUTO: 2.9 % — HIGH (ref 0–0.9)
LDH SERPL L TO P-CCNC: 173 U/L — SIGNIFICANT CHANGE UP (ref 50–242)
LYMPHOCYTES # BLD AUTO: 1.62 K/UL — SIGNIFICANT CHANGE UP (ref 1–3.3)
LYMPHOCYTES # BLD AUTO: 12.6 % — LOW (ref 13–44)
MCHC RBC-ENTMCNC: 30.9 PG — SIGNIFICANT CHANGE UP (ref 27–34)
MCHC RBC-ENTMCNC: 32.9 GM/DL — SIGNIFICANT CHANGE UP (ref 32–36)
MCV RBC AUTO: 93.7 FL — SIGNIFICANT CHANGE UP (ref 80–100)
MONOCYTES # BLD AUTO: 0.66 K/UL — SIGNIFICANT CHANGE UP (ref 0–0.9)
MONOCYTES NFR BLD AUTO: 5.1 % — SIGNIFICANT CHANGE UP (ref 2–14)
NEUTROPHILS # BLD AUTO: 9.79 K/UL — HIGH (ref 1.8–7.4)
NEUTROPHILS NFR BLD AUTO: 76.4 % — SIGNIFICANT CHANGE UP (ref 43–77)
NRBC # BLD: 0 /100 WBCS — SIGNIFICANT CHANGE UP (ref 0–0)
PLATELET # BLD AUTO: 143 K/UL — LOW (ref 150–400)
RBC # BLD: 3.66 M/UL — LOW (ref 4.2–5.8)
RBC # FLD: 12.3 % — SIGNIFICANT CHANGE UP (ref 10.3–14.5)
WBC # BLD: 12.82 K/UL — HIGH (ref 3.8–10.5)
WBC # FLD AUTO: 12.82 K/UL — HIGH (ref 3.8–10.5)

## 2023-07-17 PROCEDURE — 93970 EXTREMITY STUDY: CPT | Mod: 26

## 2023-07-17 PROCEDURE — 71045 X-RAY EXAM CHEST 1 VIEW: CPT | Mod: 26

## 2023-07-17 PROCEDURE — 71275 CT ANGIOGRAPHY CHEST: CPT | Mod: 26

## 2023-07-17 PROCEDURE — 99221 1ST HOSP IP/OBS SF/LOW 40: CPT

## 2023-07-17 PROCEDURE — 99223 1ST HOSP IP/OBS HIGH 75: CPT

## 2023-07-17 RX ORDER — OMEPRAZOLE 10 MG/1
1 CAPSULE, DELAYED RELEASE ORAL
Refills: 0 | DISCHARGE

## 2023-07-17 RX ORDER — SIMVASTATIN 20 MG/1
1 TABLET, FILM COATED ORAL
Refills: 0 | DISCHARGE

## 2023-07-17 RX ORDER — VALACYCLOVIR 500 MG/1
1 TABLET, FILM COATED ORAL
Refills: 0 | DISCHARGE

## 2023-07-17 RX ORDER — ALFUZOSIN HYDROCHLORIDE 10 MG/1
1 TABLET, EXTENDED RELEASE ORAL
Refills: 0 | DISCHARGE

## 2023-07-17 RX ADMIN — Medication 25 MILLIGRAM(S): at 21:41

## 2023-07-17 RX ADMIN — TAMSULOSIN HYDROCHLORIDE 0.8 MILLIGRAM(S): 0.4 CAPSULE ORAL at 21:41

## 2023-07-17 RX ADMIN — Medication 25 MILLIGRAM(S): at 05:26

## 2023-07-17 RX ADMIN — Medication 25 MILLIGRAM(S): at 00:00

## 2023-07-17 RX ADMIN — ENOXAPARIN SODIUM 40 MILLIGRAM(S): 100 INJECTION SUBCUTANEOUS at 05:26

## 2023-07-17 RX ADMIN — PANTOPRAZOLE SODIUM 40 MILLIGRAM(S): 20 TABLET, DELAYED RELEASE ORAL at 06:16

## 2023-07-17 RX ADMIN — SIMVASTATIN 40 MILLIGRAM(S): 20 TABLET, FILM COATED ORAL at 21:41

## 2023-07-17 RX ADMIN — Medication 20 MILLIGRAM(S): at 05:26

## 2023-07-17 RX ADMIN — VALACYCLOVIR 500 MILLIGRAM(S): 500 TABLET, FILM COATED ORAL at 11:53

## 2023-07-17 RX ADMIN — CHLORHEXIDINE GLUCONATE 1 APPLICATION(S): 213 SOLUTION TOPICAL at 11:53

## 2023-07-17 NOTE — PROGRESS NOTE ADULT - SUBJECTIVE AND OBJECTIVE BOX
[facility] DIVISION OF INFECTIOUS DISEASES  RACHELLE Manzanares Y. Patel, S. Shah, G. Shawn  [phone]  ([phone] - weekdays after 5pm and weekends)    Name: ZAHRA LONG  Age/Gender: 78y Male  MRN: [id number]    Interval History:  Patient seen and examined this morning.   No new complaints  Notes reviewed.   No concerning overnight events.  Afebrile.   Allergies: No Known Allergies      Objective:  Vitals:   T(F): 97.4 (07-17-23 @ 11:54), Max: 98.8 (07-16-23 @ 20:14)  HR: 103 (07-17-23 @ 11:54) (100 - 104)  BP: 124/74 (07-17-23 @ 11:54) (116/60 - 131/69)  RR: 18 (07-17-23 @ 11:54) (18 - 18)  SpO2: 92% (07-17-23 @ 11:54) (90% - 93%)  Physical Examination:  General: no acute distress  HEENT: NC/AT, EOMI, anicteric, neck supple  Cardio: S1, S2 present, normal rate  Resp: clear to auscultation bilaterally  Abd: soft, NT, ND, + BS  Neuro: AAOx3, no obvious focal deficits  Ext: no edema, no cyanosis, moving extremities  Skin: generalized erythematous rash with dry scaling skin  Psych: appropriate affect and mood for situation  Lines: PIV    Laboratory Studies:  CBC:                       11.3   12.82 )-----------( 143      ( 17 Jul 2023 07:12 )             34.3     WBC Trend:  12.82 07-17-23 @ 07:12  12.43 07-16-23 @ 05:54  13.10 07-15-23 @ 06:02  13.23 07-14-23 @ 09:47    CMP: 07-16    139  |  102  |  21  ----------------------------<  99  4.2   |  26  |  1.14    Ca    8.9      16 Jul 2023 05:53    Creatinine: 1.14 mg/dL (07-16-23 @ 05:53)  Creatinine: 1.11 mg/dL (07-15-23 @ 06:01)  Creatinine: 1.05 mg/dL (07-14-23 @ 09:47)    Microbiology: reviewed   Culture - Blood (collected 07-14-23 @ 10:00)  Source: .Blood Blood-Peripheral  Preliminary Report (07-16-23 @ 15:01):    No growth at 48 Hours    Culture - Urine (collected 07-14-23 @ 09:58)  Source: Clean Catch Clean Catch (Midstream)  Final Report (07-15-23 @ 15:28):    No growth    Culture - Blood (collected 07-14-23 @ 09:23)  Source: .Blood Blood-Peripheral  Preliminary Report (07-16-23 @ 15:01):    No growth at 48 Hours    Radiology: reviewed     Medications:  acetaminophen     Tablet .. 650 milliGRAM(s) Oral every 6 hours PRN  aluminum hydroxide/magnesium hydroxide/simethicone Suspension 30 milliLiter(s) Oral every 4 hours PRN  chlorhexidine 2% Cloths 1 Application(s) Topical daily  diphenhydrAMINE 25 milliGRAM(s) Oral at bedtime  enoxaparin Injectable 40 milliGRAM(s) SubCutaneous every 24 hours  hydrOXYzine hydrochloride 25 milliGRAM(s) Oral four times a day  melatonin 5 milliGRAM(s) Oral at bedtime PRN  ondansetron Injectable 4 milliGRAM(s) IV Push every 8 hours PRN  pantoprazole    Tablet 40 milliGRAM(s) Oral before breakfast  predniSONE   Tablet 20 milliGRAM(s) Oral daily  simvastatin 40 milliGRAM(s) Oral at bedtime  tamsulosin 0.8 milliGRAM(s) Oral at bedtime  valACYclovir 500 milliGRAM(s) Oral daily    Current Antimicrobials:  valACYclovir 500 milliGRAM(s) Oral daily    Prior/Completed Antimicrobials:  cefepime   IVPB  vancomycin  IVPB.

## 2023-07-17 NOTE — CONSULT NOTE ADULT - SUBJECTIVE AND OBJECTIVE BOX
CC: R. 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 Presents to [facility] for worsening fever. Patient states he was recently started on Skyrizi and since then has been experiencing increased weakness, fatigue, bilateral LE edema and fever last night (temp max 102.5) which was relieved by Tylenol. Patient and spouse present to the ED for further evaluation. ENT was consulted for right decreased hearing in his right ear. Pt thinks he has water in his ear. Pt recently had his ears cleaned by ENT Dr. Robledo at [facility]. Pt denies vertigo, ear pain, ear discharge, tinnitus.        PAST MEDICAL & SURGICAL HISTORY:  Cutaneous lymphoma      Erythroderm        Allergies    No Known Allergies    Intolerances      MEDICATIONS  (STANDING):  chlorhexidine 2% Cloths 1 Application(s) Topical daily  diphenhydrAMINE 25 milliGRAM(s) Oral at bedtime  enoxaparin Injectable 40 milliGRAM(s) SubCutaneous every 24 hours  hydrOXYzine hydrochloride 25 milliGRAM(s) Oral four times a day  pantoprazole    Tablet 40 milliGRAM(s) Oral before breakfast  predniSONE   Tablet 20 milliGRAM(s) Oral daily  simvastatin 40 milliGRAM(s) Oral at bedtime  tamsulosin 0.8 milliGRAM(s) Oral at bedtime  valACYclovir 500 milliGRAM(s) Oral daily    MEDICATIONS  (PRN):  acetaminophen     Tablet .. 650 milliGRAM(s) Oral every 6 hours PRN Temp greater or equal to 38C (100.4F), Mild Pain (1 - 3)  aluminum hydroxide/magnesium hydroxide/simethicone Suspension 30 milliLiter(s) Oral every 4 hours PRN Dyspepsia  melatonin 5 milliGRAM(s) Oral at bedtime PRN Insomnia  ondansetron Injectable 4 milliGRAM(s) IV Push every 8 hours PRN Nausea and/or Vomiting      Social History: No tobacco use    Family history: no pertinent Fhx    ROS:   ENT: all negative except as noted in HPI   CV: denies palpitations  Pulm: denies SOB, cough, hemoptysis  GI: denies change in apetite, indigestion, n/v  : denies pertinent urinary symptoms, urgency  Neuro: denies numbness/tingling, loss of sensation  Psych: denies anxiety  MS: denies muscle weakness, instability  Heme: denies easy bruising or bleeding  Endo: denies heat/cold intolerance, excessive sweating  Vascular: denies LE edema    Vital Signs Last 24 Hrs  T(C): 36.3 (17 Jul 2023 11:54), Max: 37.1 (16 Jul 2023 20:14)  T(F): 97.4 (17 Jul 2023 11:54), Max: 98.8 (16 Jul 2023 20:14)  HR: 103 (17 Jul 2023 11:54) (100 - 104)  BP: 124/74 (17 Jul 2023 11:54) (116/60 - 131/69)  BP(mean): --  RR: 18 (17 Jul 2023 11:54) (18 - 18)  SpO2: 92% (17 Jul 2023 11:54) (90% - 93%)    Parameters below as of 17 Jul 2023 11:54  Patient On (Oxygen Delivery Method): room air                              11.3   12.82 )-----------( 143      ( 17 Jul 2023 07:12 )             34.3    07-16    139  |  102  |  21  ----------------------------<  99  4.2   |  26  |  1.14    Ca    8.9      16 Jul 2023 05:53         PHYSICAL EXAM:  Gen: NAD  Skin: No rashes, bruises, or lesions  Head: Normocephalic, Atraumatic  Face: no edema, erythema, or fluctuance. Parotid glands soft without mass  Eyes: no scleral injection  Ears: Small amount of cerumen in B/L ears, TMs intact, no perforation, no erythema or edema, no mastoid or tragal tenderness.  Nose: Nares bilaterally patent, no discharge  Mouth: No Stridor / Drooling / Trismus.  Mucosa moist, tongue/uvula midline, oropharynx clear  Neck: Flat, supple, no lymphadenopathy, trachea midline, no masses  Lymphatic: No lymphadenopathy  Resp: breathing easily, no stridor  CV: no peripheral edema/cyanosis  GI: nondistended   Peripheral vascular: no JVD or edema  Neuro: facial nerve intact, no facial droop       CC: R. 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 Presents to [facility] for worsening fever. Patient states he was recently started on Skyrizi and since then has been experiencing increased weakness, fatigue, bilateral LE edema and fever last night (temp max 102.5) which was relieved by Tylenol. Patient and spouse present to the ED for further evaluation. ENT was consulted for right decreased hearing in his right ear. Pt thinks he has water in his ear. Pt recently had his ears cleaned by Dr. Robledo (PCP). Pt denies vertigo, ear pain, ear discharge, tinnitus.        PAST MEDICAL & SURGICAL HISTORY:  Cutaneous lymphoma      Erythroderm        Allergies    No Known Allergies    Intolerances      MEDICATIONS  (STANDING):  chlorhexidine 2% Cloths 1 Application(s) Topical daily  diphenhydrAMINE 25 milliGRAM(s) Oral at bedtime  enoxaparin Injectable 40 milliGRAM(s) SubCutaneous every 24 hours  hydrOXYzine hydrochloride 25 milliGRAM(s) Oral four times a day  pantoprazole    Tablet 40 milliGRAM(s) Oral before breakfast  predniSONE   Tablet 20 milliGRAM(s) Oral daily  simvastatin 40 milliGRAM(s) Oral at bedtime  tamsulosin 0.8 milliGRAM(s) Oral at bedtime  valACYclovir 500 milliGRAM(s) Oral daily    MEDICATIONS  (PRN):  acetaminophen     Tablet .. 650 milliGRAM(s) Oral every 6 hours PRN Temp greater or equal to 38C (100.4F), Mild Pain (1 - 3)  aluminum hydroxide/magnesium hydroxide/simethicone Suspension 30 milliLiter(s) Oral every 4 hours PRN Dyspepsia  melatonin 5 milliGRAM(s) Oral at bedtime PRN Insomnia  ondansetron Injectable 4 milliGRAM(s) IV Push every 8 hours PRN Nausea and/or Vomiting      Social History: No tobacco use    Family history: no pertinent Fhx    ROS:   ENT: all negative except as noted in HPI   CV: denies palpitations  Pulm: denies SOB, cough, hemoptysis  GI: denies change in apetite, indigestion, n/v  : denies pertinent urinary symptoms, urgency  Neuro: denies numbness/tingling, loss of sensation  Psych: denies anxiety  MS: denies muscle weakness, instability  Heme: denies easy bruising or bleeding  Endo: denies heat/cold intolerance, excessive sweating  Vascular: denies LE edema    Vital Signs Last 24 Hrs  T(C): 36.3 (17 Jul 2023 11:54), Max: 37.1 (16 Jul 2023 20:14)  T(F): 97.4 (17 Jul 2023 11:54), Max: 98.8 (16 Jul 2023 20:14)  HR: 103 (17 Jul 2023 11:54) (100 - 104)  BP: 124/74 (17 Jul 2023 11:54) (116/60 - 131/69)  BP(mean): --  RR: 18 (17 Jul 2023 11:54) (18 - 18)  SpO2: 92% (17 Jul 2023 11:54) (90% - 93%)    Parameters below as of 17 Jul 2023 11:54  Patient On (Oxygen Delivery Method): room air                              11.3   12.82 )-----------( 143      ( 17 Jul 2023 07:12 )             34.3    07-16    139  |  102  |  21  ----------------------------<  99  4.2   |  26  |  1.14    Ca    8.9      16 Jul 2023 05:53         PHYSICAL EXAM:  Gen: NAD  Skin: No rashes, bruises, or lesions  Head: Normocephalic, Atraumatic  Face: no edema, erythema, or fluctuance. Parotid glands soft without mass  Eyes: no scleral injection  Ears: Small amount of cerumen in B/L ears, TMs intact, no perforation, no erythema or edema, no mastoid or tragal tenderness.  Nose: Nares bilaterally patent, no discharge  Mouth: No Stridor / Drooling / Trismus.  Mucosa moist, tongue/uvula midline, oropharynx clear  Neck: Flat, supple, no lymphadenopathy, trachea midline, no masses  Lymphatic: No lymphadenopathy  Resp: breathing easily, no stridor  CV: no peripheral edema/cyanosis  GI: nondistended   Peripheral vascular: no JVD or edema  Neuro: facial nerve intact, no facial droop

## 2023-07-17 NOTE — CONSULT NOTE ADULT - ASSESSMENT
-incomplete note- # Dermatitis/erythroderma   - outside bx 4/2023 showing subacute spongiotic dermatitis  - punch biopsy at right thigh on 6/13/2023 suggestive of Psoriasis.  - S/p pred taper 60mg x 1 week, 40mg x 1 week, 20mg x 1 week (finished last dose of pred 20 today)  - recommend continuing pred taper starting with 15mg PO daily (start 7/18) for 5 days. If not flaring, can then go on to take 10mg PO daily for 5 days.  - prior flow cytometry- negative for lymphoma  - pt s/p one dose skyrizi administered July 11th in derm clinic    The patient's chart was reviewed in addition to photos of the rash taken by the primary team with the permission of the patient.  Patient was seen at bedside  with the dermatology attending Dr. Rivera.   Recommendations were communicated with the primary team.  Please page [phone] for further related questions.    Krystina Varela MD  Resident Physician, PGY3  [facility]  Pager: [phone]

## 2023-07-17 NOTE — CONSULT NOTE ADULT - SUBJECTIVE AND OBJECTIVE BOX
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 Presents to [facility] for worsening fever. Patient states he was recently started on Skyrizi and since then has been experiencing increased weakness, fatigue, bilateral LE edema and fever last night (temp max 102.5) which was relieved by Tylenol. Patient and spouse present to the ED for further evaluation. Denies any chest pain, n/v/d or recent sick contacts. (14 Jul 2023 12:37)        PAST MEDICAL & SURGICAL HISTORY:  Cutaneous lymphoma      Erythroderm          Review of Systems:  REVIEW OF SYSTEMS      General: no fevers/chills, no lethary	    Skin/Breast: see HPI  	  Ophthalmologic: no eye pain or change in vision  	  ENMT: no dysphagia or change in hearing    Respiratory and Thorax: no SOB or cough  	  Cardiovascular: no palpitations or chest pain    Gastrointestinal: no abdomenal pain or blood in stool     Genitourinary: no dysuria or frequency    Musculoskeletal: no joint pains or weakness	    Neurological:no weakness, numbness , or tingling    MEDICATIONS  (STANDING):  chlorhexidine 2% Cloths 1 Application(s) Topical daily  diphenhydrAMINE 25 milliGRAM(s) Oral at bedtime  enoxaparin Injectable 40 milliGRAM(s) SubCutaneous every 24 hours  hydrOXYzine hydrochloride 25 milliGRAM(s) Oral four times a day  pantoprazole    Tablet 40 milliGRAM(s) Oral before breakfast  predniSONE   Tablet 20 milliGRAM(s) Oral daily  simvastatin 40 milliGRAM(s) Oral at bedtime  tamsulosin 0.8 milliGRAM(s) Oral at bedtime  valACYclovir 500 milliGRAM(s) Oral daily    ALLERGIES: No Known Allergies        SOCIAL HISTORY:  ____________________________________  Social History:    FAMILY HISTORY:        VITAL SIGNS LAST 24 HOURS:  T(F): 97.9 (07-17 @ 20:30), Max: 98.1 (07-17 @ 05:00)  HR: 109 (07-17 @ 20:30) (100 - 109)  BP: 158/72 (07-17 @ 20:30) (116/60 - 158/72)  RR: 18 (07-17 @ 20:30) (18 - 18)    PHYSICAL EXAM:     The patient was alert and oriented X 3, well nourished, and in no  apparent distress.  OP showed no ulcerations  There was no visible lymphadenopathy.  Conjunctiva were non injected  There was no clubbing or edema of extremities.  The scalp, hair, face, eyebrows, lips, OP, neck, chest, back,   extremities X 4, nails were examined.  There was no hyperhidrosis or bromhidrosis.    Of note on skin exam:     ____________________________________    LABS:                        11.3   12.82 )-----------( 143      ( 17 Jul 2023 07:12 )             34.3     07-16    139  |  102  |  21  ----------------------------<  99  4.2   |  26  |  1.14    Ca    8.9      16 Jul 2023 05:53        Urinalysis Basic - ( 16 Jul 2023 05:53 )    Color: x / Appearance: x / SG: x / pH: x  Gluc: 99 mg/dL / Ketone: x  / Bili: x / Urobili: x   Blood: x / Protein: x / Nitrite: x   Leuk Esterase: x / RBC: x / WBC x   Sq Epi: x / Non Sq Epi: x / Bacteria: x     HPI:  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 Presents to [facility] for worsening fever. Patient states he was recently started on Skyrizi and since then has been experiencing increased weakness, fatigue, bilateral LE edema and fever last night (temp max 102.5) which was relieved by Tylenol. Patient and spouse present to the ED for further evaluation. Denies any chest pain, n/v/d or recent sick contacts. (14 Jul 2023 12:37)    Derm HPI:  (Outpatient derm HPI from 7/11/23 clinic visit copied below for reference)  Diffuse erythroderma  - Started in February and slowly progressed to involve entire body in April. Medication changes: started HCTZ in January. Stopped all medications recently but rash persists and itch is not improving. rash was biopsied in April by Dr. English ([place]) and resulted as spong dermatitis.   - Bx here 7/3 suggestive of PsO.     - Derm Hx: BCC (2022) on R lower leg s/p Mohs. no hx AD, PsO.   - Rash Hx:   4/2021: Pt had a solitary plaque on his L upper arm that was biopsied and confirmed as pseudolymphoma at [facility]. Treated w XRT x1.  2/2023: Pt developed red rash on face, was told it is "dermatitis" and treated with topical  4/2023: Pt noted red blotches on chest that were slowly expanding. Biopsied by pt's derm in [place] (Dr. English) - resulted as spongiotic derm  5/2023: Red rash spread to pt's entire body, was seen at [facility] - potentially attributed to hypersensitivity rxn to HCTZ (pt started this just prior to rash onset in January). Pt's nephew is an Oncologist and sent tissue for TCR studies - resulted negative. BCR studies also sent but inadequate sample, was sent again recently by pt's Derm in [place] Dr. Baker.   6/13/23: was started on Pred 40mg 2 weeks ago, then increased to 80mg 2 days ago w no improvement in rash or itching  - no recent fever, chills, night sweats. lost 9lb in the past week. no abd pain. no changes to vision or eye pain. no lesions in mouth. no pain or lesions in genitals.   6/30/23: Pt reports minimal improvement. On pred taper (currently on 60mg a day). reports persistent itch despite gabapentin 300 nightly. pending biologic approval for his psoriasis.   7/11/2023: Started skyrizi, reports significant emotional impact. Persistent itch, no improvement in rash or itching.     TODAY 7/17/23: Derm team consulted for evaluation of erythroderma with hx as above. patient was evaluated at bedside with wife present. Reports using plain vaseline daily and currently on pred 20mg PO daily, noting improvement in itch. Using eye topicals as per his ophthalmologist     PAST MEDICAL & SURGICAL HISTORY:  Cutaneous lymphoma      Erythroderma          Review of Systems:  REVIEW OF SYSTEMS      General: see HPI	    Skin/Breast: see HPI  	  Ophthalmologic: see HPI  	  ENMT: no dysphagia or change in hearing    Respiratory and Thorax: see HPI  	  Cardiovascular: see HPI    Musculoskeletal: see HPI	    Neurological: no weakness, numbness , or tingling    MEDICATIONS  (STANDING):  chlorhexidine 2% Cloths 1 Application(s) Topical daily  diphenhydrAMINE 25 milliGRAM(s) Oral at bedtime  enoxaparin Injectable 40 milliGRAM(s) SubCutaneous every 24 hours  hydrOXYzine hydrochloride 25 milliGRAM(s) Oral four times a day  pantoprazole    Tablet 40 milliGRAM(s) Oral before breakfast  predniSONE   Tablet 20 milliGRAM(s) Oral daily  simvastatin 40 milliGRAM(s) Oral at bedtime  tamsulosin 0.8 milliGRAM(s) Oral at bedtime  valACYclovir 500 milliGRAM(s) Oral daily    ALLERGIES: No Known Allergies        SOCIAL HISTORY:  ____________________________________  Social History:    FAMILY HISTORY:        VITAL SIGNS LAST 24 HOURS:  T(F): 97.9 (07-17 @ 20:30), Max: 98.1 (07-17 @ 05:00)  HR: 109 (07-17 @ 20:30) (100 - 109)  BP: 158/72 (07-17 @ 20:30) (116/60 - 158/72)  RR: 18 (07-17 @ 20:30) (18 - 18)    PHYSICAL EXAM:   The patient was alert and oriented X 3, well nourished, and in no  apparent distress.  There was no visible lymphadenopathy.    Of note on skin exam:   erythrodermic   BLE pitting edema  ectropion b/l   No palpable inguinal axillary or cervical lymphadenopathy    LABS:                        11.3   12.82 )-----------( 143      ( 17 Jul 2023 07:12 )             34.3     07-16    139  |  102  |  21  ----------------------------<  99  4.2   |  26  |  1.14    Ca    8.9      16 Jul 2023 05:53        Urinalysis Basic - ( 16 Jul 2023 05:53 )    Color: x / Appearance: x / SG: x / pH: x  Gluc: 99 mg/dL / Ketone: x  / Bili: x / Urobili: x   Blood: x / Protein: x / Nitrite: x   Leuk Esterase: x / RBC: x / WBC x   Sq Epi: x / Non Sq Epi: x / Bacteria: x

## 2023-07-17 NOTE — CONSULT NOTE ADULT - ASSESSMENT
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) and Diffuse Erythroderma (initial rash starting February 2023, worsened with May 2023) currently on prednisone taper with first dose Skyrizi ( RISANKIZUMAB-RZAA-  AN IL-23 inhibitor_  # Fever r/o Sepsis:  # Diffuse Erythroderma:  # HLD:  # Herpes:  # DVT ppx:    # Fever r/o Sepsis:-   -he has non covid coronavirus infection; that could  be th  reason for his fever:   -however his chest xray is mostly clar: which does not explain his hypoxia:   -he also has inability to take deep breaths;   -In addition he is hypoxic with clear chest radiograph and have been on high dose steroids without bactrim prophylaxis  -would do cta and see th elung parenchyma:   -send fungitell and sputum for pcp and LDH   -cont to use o2 to keep his sao2 above 90% all the time:   do dopplers as he has significant swelling of his legs  -dw ID   # Diffuse Erythroderma:  -he has been on high dosages of steroids which are gradually being taperd:   # HLD:  -on simvastatin  # Herpes:  -on valtrex  # DVT ppx:  -on lovenox:    dw id and ACP: needs ct a urgent

## 2023-07-17 NOTE — PROGRESS NOTE ADULT - SUBJECTIVE AND OBJECTIVE BOX
Patient is a 78y old  Male who presents with a chief complaint of Fever (16 Jul 2023 17:30)      SUBJECTIVE / OVERNIGHT EVENTS:  Patient seen and examined.   Some improvement.      Vital Signs Last 24 Hrs  T(C): 36.3 (17 Jul 2023 11:54), Max: 37.1 (16 Jul 2023 20:14)  T(F): 97.4 (17 Jul 2023 11:54), Max: 98.8 (16 Jul 2023 20:14)  HR: 103 (17 Jul 2023 11:54) (100 - 104)  BP: 124/74 (17 Jul 2023 11:54) (116/60 - 131/69)  BP(mean): --  RR: 18 (17 Jul 2023 11:54) (18 - 18)  SpO2: 92% (17 Jul 2023 11:54) (90% - 93%)    Parameters below as of 17 Jul 2023 11:54  Patient On (Oxygen Delivery Method): room air      I&O's Summary    16 Jul 2023 07:01  -  17 Jul 2023 07:00  --------------------------------------------------------  IN: 480 mL / OUT: 0 mL / NET: 480 mL        PHYSICAL EXAM:  GENERAL: NAD, well-developed, comfortable  HEAD:  Atraumatic, Normocephalic  EYES: + bilateral blepharitis  NECK: Supple, No JVD  CHEST/LUNG: Clear to auscultation bilaterally; No wheeze  HEART: Regular rate and rhythm; No murmurs, rubs, or gallops  ABDOMEN: Soft, Nontender, Nondistended; Bowel sounds present  NEURO: AAOx3, no focal weakness, 5/5 b/l extremity strength, b/l knee no arthritis, no effusion   EXTREMITIES:  2+ Peripheral Pulses, No clubbing, cyanosis, bilateral LE edema  SKIN: generalized severe erythema      LABS:                        11.3   12.82 )-----------( 143      ( 17 Jul 2023 07:12 )             34.3     07-16    139  |  102  |  21  ----------------------------<  99  4.2   |  26  |  1.14    Ca    8.9      16 Jul 2023 05:53        CAPILLARY BLOOD GLUCOSE            Urinalysis Basic - ( 16 Jul 2023 05:53 )    Color: x / Appearance: x / SG: x / pH: x  Gluc: 99 mg/dL / Ketone: x  / Bili: x / Urobili: x   Blood: x / Protein: x / Nitrite: x   Leuk Esterase: x / RBC: x / WBC x   Sq Epi: x / Non Sq Epi: x / Bacteria: x        RADIOLOGY & ADDITIONAL TESTS:    Imaging Personally Reviewed:  [x] YES  [ ] NO    Consultant(s) Notes Reviewed:  [x] YES  [ ] NO      MEDICATIONS  (STANDING):  chlorhexidine 2% Cloths 1 Application(s) Topical daily  diphenhydrAMINE 25 milliGRAM(s) Oral at bedtime  enoxaparin Injectable 40 milliGRAM(s) SubCutaneous every 24 hours  hydrOXYzine hydrochloride 25 milliGRAM(s) Oral four times a day  pantoprazole    Tablet 40 milliGRAM(s) Oral before breakfast  predniSONE   Tablet 20 milliGRAM(s) Oral daily  simvastatin 40 milliGRAM(s) Oral at bedtime  tamsulosin 0.8 milliGRAM(s) Oral at bedtime  valACYclovir 500 milliGRAM(s) Oral daily    MEDICATIONS  (PRN):  acetaminophen     Tablet .. 650 milliGRAM(s) Oral every 6 hours PRN Temp greater or equal to 38C (100.4F), Mild Pain (1 - 3)  aluminum hydroxide/magnesium hydroxide/simethicone Suspension 30 milliLiter(s) Oral every 4 hours PRN Dyspepsia  melatonin 5 milliGRAM(s) Oral at bedtime PRN Insomnia  ondansetron Injectable 4 milliGRAM(s) IV Push every 8 hours PRN Nausea and/or Vomiting      Care Discussed with Consultants/Other Providers [x] YES  [ ] NO    HEALTH ISSUES - PROBLEM Dx:

## 2023-07-17 NOTE — CONSULT NOTE ADULT - PROBLEM SELECTOR RECOMMENDATION 9
F/U with Dr Robledo as outpt at [facility] [phone]  Care per primary team F/U as outpatient to remove remaining cerumen   no evidence of infection   Care per primary team

## 2023-07-17 NOTE — CONSULT NOTE ADULT - NS ATTEND AMEND GEN_ALL_CORE FT
felt there is water in his ear  no evidence of moisture or infection but there is some cerumen  recommend debrox and f/up as outpatient, will clear remaining cerumen

## 2023-07-17 NOTE — CONSULT NOTE ADULT - SUBJECTIVE AND OBJECTIVE BOX
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 Presents to [facility] for worsening fever. Patient states he was recently started on Skyrizi and since then has been experiencing increased weakness, fatigue, bilateral LE edema and fever last night (temp max 102.5) which was relieved by Tylenol. Patient and spouse present to the ED for further evaluation. Denies any chest pain, n/v/d or recent sick contacts. (14 Jul 2023 12:37)    Now he feels SOB:  prior to his sickness he was able to walk for uite some distance:  but now he cant walk much  :    In addition:  he cant do incentive spirometry properly and not able to take deep breath sin : he has been taking high dose steroids for past two months without DVT propahylaxis:  at first he ws started on 40 mg of prednisone and then increased to 80 mg for few weeks and have been on tapering steroids at home:     he has no cough  and no sob at rest:  he is also hypoxic:     ?FOLLOWING PRESENT  [x ] Hx of PE/DVT, [x Hx COPD, x ] Hx of Asthma, [ y] Hx of Hospitalization, [ x  Hx of BiPAP/CPAP use, x ] Hx of FARHAD    Allergies    No Known Allergies    Intolerances        PAST MEDICAL & SURGICAL HISTORY:  Cutaneous lymphoma      Erythroderm          FAMILY HISTORY:      Social History: [ 10 pk years ] TOBACCO                  [ x ] ETOH                                 [x IVDA/DRUGS    REVIEW OF SYSTEMS      General:	fever    Skin/Breast:x  	  Ophthalmologic:x  	  ENMT:	x  Respiratory and Thorax:  sob, callahan : hypoxia  	  Cardiovascular:	x    Gastrointestinal:	x    Genitourinary:	x    Musculoskeletal:	x    Neurological:	x    Psychiatric:	x    Hematology/Lymphatics:	x    Endocrine:	x    Allergic/Immunologic:	x    MEDICATIONS  (STANDING):  chlorhexidine 2% Cloths 1 Application(s) Topical daily  diphenhydrAMINE 25 milliGRAM(s) Oral at bedtime  enoxaparin Injectable 40 milliGRAM(s) SubCutaneous every 24 hours  hydrOXYzine hydrochloride 25 milliGRAM(s) Oral four times a day  pantoprazole    Tablet 40 milliGRAM(s) Oral before breakfast  predniSONE   Tablet 20 milliGRAM(s) Oral daily  simvastatin 40 milliGRAM(s) Oral at bedtime  tamsulosin 0.8 milliGRAM(s) Oral at bedtime  valACYclovir 500 milliGRAM(s) Oral daily    MEDICATIONS  (PRN):  acetaminophen     Tablet .. 650 milliGRAM(s) Oral every 6 hours PRN Temp greater or equal to 38C (100.4F), Mild Pain (1 - 3)  aluminum hydroxide/magnesium hydroxide/simethicone Suspension 30 milliLiter(s) Oral every 4 hours PRN Dyspepsia  melatonin 5 milliGRAM(s) Oral at bedtime PRN Insomnia  ondansetron Injectable 4 milliGRAM(s) IV Push every 8 hours PRN Nausea and/or Vomiting       Vital Signs Last 24 Hrs  T(C): 36.3 (17 Jul 2023 11:54), Max: 37.1 (16 Jul 2023 20:14)  T(F): 97.4 (17 Jul 2023 11:54), Max: 98.8 (16 Jul 2023 20:14)  HR: 103 (17 Jul 2023 11:54) (100 - 104)  BP: 124/74 (17 Jul 2023 11:54) (116/60 - 131/69)  BP(mean): --  RR: 18 (17 Jul 2023 11:54) (18 - 18)  SpO2: 92% (17 Jul 2023 11:54) (90% - 93%)    Parameters below as of 17 Jul 2023 11:54  Patient On (Oxygen Delivery Method): room air    Orthostatic VS          I&O's Summary    16 Jul 2023 07:01  -  17 Jul 2023 07:00  --------------------------------------------------------  IN: 480 mL / OUT: 0 mL / NET: 480 mL        Physical Exam:   GENERAL: NAD, well-groomed, well-developed  HEENT: JONY/   Atraumatic, Normocephalic  ENMT: No tonsillar erythema, exudates, or enlargement; Moist mucous membranes, Good dentition, No lesions  NECK: Supple, No JVD, Normal thyroid  CHEST/LUNG: Clear to auscultation bilateral  CVS: Regular rate and rhythm; No murmurs, rubs, or gallops  GI: : Soft, Nontender, Nondistended; Bowel sounds present  NERVOUS SYSTEM:  Alert & Oriented X3  EXTREMITIES-edema+++  LYMPH: No lymphadenopathy noted  SKIN:generalised erytheroderma  ENDOCRINOLOGY: No Thyromegaly  PSYCH: Appropriate    Labs:  5.3<46<4>>49<<7.435>>5.3<<3><<4><<5<<499>>SARS-CoV-2: NotDetec (14 Jul 2023 09:46)                              11.3   12.82 )-----------( 143      ( 17 Jul 2023 07:12 )             34.3                         12.1   12.43 )-----------( 205      ( 16 Jul 2023 05:54 )             37.0                         11.8   13.10 )-----------( 231      ( 15 Jul 2023 06:02 )             36.7                         12.8   13.23 )-----------( 262      ( 14 Jul 2023 09:47 )             38.9     07-16    139  |  102  |  21  ----------------------------<  99  4.2   |  26  |  1.14  07-15    139  |  101  |  18  ----------------------------<  102<H>  4.3   |  26  |  1.11  07-14    134<L>  |  97  |  21  ----------------------------<  101<H>  4.2   |  27  |  1.05    Ca    8.9      16 Jul 2023 05:53    TPro  5.2<L>  /  Alb  2.7<L>  /  TBili  0.4  /  DBili  x   /  AST  15  /  ALT  29  /  AlkPhos  52  07-15  TPro  6.0  /  Alb  3.3  /  TBili  0.6  /  DBili  x   /  AST  18  /  ALT  32  /  AlkPhos  61  07-14    CAPILLARY BLOOD GLUCOSE            Urinalysis Basic - ( 16 Jul 2023 05:53 )    Color: x / Appearance: x / SG: x / pH: x  Gluc: 99 mg/dL / Ketone: x  / Bili: x / Urobili: x   Blood: x / Protein: x / Nitrite: x   Leuk Esterase: x / RBC: x / WBC x   Sq Epi: x / Non Sq Epi: x / Bacteria: x      D DImer    < from: CT Angio Head w/wo Cont (04.29.09 @ 10:47) >  daughter aneurysm to the right of midline which measures approximately 3   mm. In all the width of this aneurysm and adjacent small daughter is   approximately 6 mm. There is a relatively narrow neck. There is no spasm.     The middle cerebral artery bifurcations are normal.        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 The the origin of the right A2   segment of the anterior cerebral artery may emanate from the base of the   aneurysm.    < end of copied text >    Studies  Chest X-RAY  CT SCAN Chest   CT Abdomen  Venous Dopplers: LE:   Others        rad

## 2023-07-17 NOTE — PROGRESS NOTE ADULT - ASSESSMENT
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     SIRS/FEVER/ERYTHRODERMA  Presentation with pronounced scaling of skin very suggestive of erythrodermic psoriasis and unclear etiology of acute  leading to hospitalization and fever  very curious history of Primary Cutaneous B-cell Lymphoma   Infection in June 2023 at [facility] RVP+ for seasonal coronavirus   June 2022 was SARS-CoV-2 here at [facility]  Leukocytosis -- likely reactive d/t steroid for diffuse erythroderma  Fever may be due to recent skyrizi as this is a humanized monoclonal antibody, seasonal coronavirus, but low suspicion for bacterial sepsis     Recommendations:  -continue to observe off antibiotics   -continued steroid taper per primary team  -continue supportive care   Stable from ID standpoint at this time       Patrick Anderson M.D.  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     SIRS/FEVER/ERYTHRODERMA  Presentation with pronounced scaling of skin very suggestive of erythrodermic psoriasis and unclear etiology of acute  leading to hospitalization and fever  very curious history of Primary Cutaneous B-cell Lymphoma   Infection in June 2023 at [facility] RVP+ for seasonal coronavirus   June 2022 was SARS-CoV-2 here at [facility] ER  Leukocytosis -- likely reactive d/t steroid for diffuse erythroderma  Fever may be due to recent skyrizi as this is a humanized monoclonal antibody, seasonal coronavirus, but low suspicion for bacterial sepsis     D/w Pulm - will obtain CTA chest to r/o PE and further eval for hypoxia  -agree with concern for possible PCP given patient has been on steroids for ~2 months without PCP ppx  -Fungitell and LDH ordered   -follow up CTA chest   -can continue to observe off antibiotics pending above  -continued steroid taper -noted plan to end today per pt  -continue supportive care       Patrick Anderson M.D.  [facility], Division of Infectious Diseases  [phone]  After 5pm on weekdays and all day on weekends - please call [phone]

## 2023-07-17 NOTE — PROGRESS NOTE ADULT - ASSESSMENT
Patient is a 78 year old male with PMHx Unspecified Autoimmune Skin Condition (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 Presents to [facility] for worsening fever.    # Fever r/o viral URI:  - CXR with no evidence of acute pulmonary disease.  - RVP +seasonal coronavirus (not COVID)  - Monitor temps/WBC  - S/p Cefepime/Vanco x1 in ED-> monitor off abx per ID  - BCx/UCx with NGTD  - Supportive care  - ID following    # Diffuse Erythroderma:  # LE edema  - C/w Prednisone taper -> 20MG till Monday  -F/u Dermatology consulted   -Doppler pending     #Hypoxia   -cxr neg   -likely due to atelectasis , deconditioning   -incentive spirometer  -increasing mobilization   -cxr      #Left Ear Fullness  -f/u ENT consult      # HLD:  - C/w Statin    # Herpes:  - C/w Valtrex    # DVT ppx:  - Lovenox    [facility]  [phone]

## 2023-07-17 NOTE — CONSULT NOTE ADULT - ASSESSMENT
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 Presents to [facility] for worsening fever. ENT was consulted for hearing loss since his ears were cleaned by ENT as outpt. Exam revealed a small amount of cerumen B/L, ear canal open and TMs intact. Recommend F/U with ENT as outpt Dr Robledo at [facility] [phone]

## 2023-07-18 LAB
ALBUMIN SERPL ELPH-MCNC: 2.7 G/DL — LOW (ref 3.3–5)
ALP SERPL-CCNC: 52 U/L — SIGNIFICANT CHANGE UP (ref 40–120)
ALT FLD-CCNC: 33 U/L — SIGNIFICANT CHANGE UP (ref 10–45)
ANION GAP SERPL CALC-SCNC: 12 MMOL/L — SIGNIFICANT CHANGE UP (ref 5–17)
AST SERPL-CCNC: 24 U/L — SIGNIFICANT CHANGE UP (ref 10–40)
BASOPHILS # BLD AUTO: 0.21 K/UL — HIGH (ref 0–0.2)
BASOPHILS NFR BLD AUTO: 1.8 % — SIGNIFICANT CHANGE UP (ref 0–2)
BILIRUB SERPL-MCNC: 0.4 MG/DL — SIGNIFICANT CHANGE UP (ref 0.2–1.2)
BUN SERPL-MCNC: 16 MG/DL — SIGNIFICANT CHANGE UP (ref 7–23)
CALCIUM SERPL-MCNC: 9 MG/DL — SIGNIFICANT CHANGE UP (ref 8.4–10.5)
CHLORIDE SERPL-SCNC: 100 MMOL/L — SIGNIFICANT CHANGE UP (ref 96–108)
CO2 SERPL-SCNC: 26 MMOL/L — SIGNIFICANT CHANGE UP (ref 22–31)
CREAT SERPL-MCNC: 1.09 MG/DL — SIGNIFICANT CHANGE UP (ref 0.5–1.3)
EGFR: 69 ML/MIN/1.73M2 — SIGNIFICANT CHANGE UP
EOSINOPHIL # BLD AUTO: 0.41 K/UL — SIGNIFICANT CHANGE UP (ref 0–0.5)
EOSINOPHIL NFR BLD AUTO: 3.5 % — SIGNIFICANT CHANGE UP (ref 0–6)
GLUCOSE SERPL-MCNC: 97 MG/DL — SIGNIFICANT CHANGE UP (ref 70–99)
HCT VFR BLD CALC: 34.4 % — LOW (ref 39–50)
HEPARIN-PF4 AB RESULT: <0.6 U/ML — SIGNIFICANT CHANGE UP (ref 0–0.9)
HGB BLD-MCNC: 11.2 G/DL — LOW (ref 13–17)
LYMPHOCYTES # BLD AUTO: 1.46 K/UL — SIGNIFICANT CHANGE UP (ref 1–3.3)
LYMPHOCYTES # BLD AUTO: 12.4 % — LOW (ref 13–44)
MCHC RBC-ENTMCNC: 31 PG — SIGNIFICANT CHANGE UP (ref 27–34)
MCHC RBC-ENTMCNC: 32.6 GM/DL — SIGNIFICANT CHANGE UP (ref 32–36)
MCV RBC AUTO: 95.3 FL — SIGNIFICANT CHANGE UP (ref 80–100)
MONOCYTES # BLD AUTO: 0.84 K/UL — SIGNIFICANT CHANGE UP (ref 0–0.9)
MONOCYTES NFR BLD AUTO: 7.1 % — SIGNIFICANT CHANGE UP (ref 2–14)
NEUTROPHILS # BLD AUTO: 8.66 K/UL — HIGH (ref 1.8–7.4)
NEUTROPHILS NFR BLD AUTO: 73.4 % — SIGNIFICANT CHANGE UP (ref 43–77)
PF4 HEPARIN CMPLX AB SER-ACNC: NEGATIVE — SIGNIFICANT CHANGE UP
PLATELET # BLD AUTO: 96 K/UL — LOW (ref 150–400)
POTASSIUM SERPL-MCNC: 4.1 MMOL/L — SIGNIFICANT CHANGE UP (ref 3.5–5.3)
POTASSIUM SERPL-SCNC: 4.1 MMOL/L — SIGNIFICANT CHANGE UP (ref 3.5–5.3)
PROT SERPL-MCNC: 5.3 G/DL — LOW (ref 6–8.3)
RBC # BLD: 3.61 M/UL — LOW (ref 4.2–5.8)
RBC # FLD: 12.4 % — SIGNIFICANT CHANGE UP (ref 10.3–14.5)
SODIUM SERPL-SCNC: 138 MMOL/L — SIGNIFICANT CHANGE UP (ref 135–145)
WBC # BLD: 11.8 K/UL — HIGH (ref 3.8–10.5)
WBC # FLD AUTO: 11.8 K/UL — HIGH (ref 3.8–10.5)

## 2023-07-18 PROCEDURE — 99233 SBSQ HOSP IP/OBS HIGH 50: CPT

## 2023-07-18 PROCEDURE — 93306 TTE W/DOPPLER COMPLETE: CPT | Mod: 26

## 2023-07-18 RX ORDER — ATOVAQUONE 750 MG/5ML
1500 SUSPENSION ORAL DAILY
Refills: 0 | Status: DISCONTINUED | OUTPATIENT
Start: 2023-07-18 | End: 2023-07-21

## 2023-07-18 RX ORDER — SODIUM CHLORIDE 9 MG/ML
1000 INJECTION INTRAMUSCULAR; INTRAVENOUS; SUBCUTANEOUS
Refills: 0 | Status: DISCONTINUED | OUTPATIENT
Start: 2023-07-18 | End: 2023-07-19

## 2023-07-18 RX ADMIN — SODIUM CHLORIDE 75 MILLILITER(S): 9 INJECTION INTRAMUSCULAR; INTRAVENOUS; SUBCUTANEOUS at 18:00

## 2023-07-18 RX ADMIN — VALACYCLOVIR 500 MILLIGRAM(S): 500 TABLET, FILM COATED ORAL at 11:47

## 2023-07-18 RX ADMIN — SIMVASTATIN 40 MILLIGRAM(S): 20 TABLET, FILM COATED ORAL at 21:25

## 2023-07-18 RX ADMIN — Medication 25 MILLIGRAM(S): at 23:40

## 2023-07-18 RX ADMIN — CHLORHEXIDINE GLUCONATE 1 APPLICATION(S): 213 SOLUTION TOPICAL at 11:53

## 2023-07-18 RX ADMIN — PANTOPRAZOLE SODIUM 40 MILLIGRAM(S): 20 TABLET, DELAYED RELEASE ORAL at 06:01

## 2023-07-18 RX ADMIN — ATOVAQUONE 1500 MILLIGRAM(S): 750 SUSPENSION ORAL at 11:47

## 2023-07-18 RX ADMIN — Medication 25 MILLIGRAM(S): at 21:25

## 2023-07-18 RX ADMIN — Medication 15 MILLIGRAM(S): at 11:46

## 2023-07-18 RX ADMIN — TAMSULOSIN HYDROCHLORIDE 0.8 MILLIGRAM(S): 0.4 CAPSULE ORAL at 21:25

## 2023-07-18 NOTE — PROGRESS NOTE ADULT - SUBJECTIVE AND OBJECTIVE BOX
Date of Service: 07-18-23 @ 16:30    Patient is a 78y old  Male who presents with a chief complaint of Fever (18 Jul 2023 12:10)      Any change in ROS: she is not on oxygen:     MEDICATIONS  (STANDING):  atovaquone  Suspension 1500 milliGRAM(s) Oral daily  chlorhexidine 2% Cloths 1 Application(s) Topical daily  diphenhydrAMINE 25 milliGRAM(s) Oral at bedtime  enoxaparin Injectable 40 milliGRAM(s) SubCutaneous every 24 hours  hydrOXYzine hydrochloride 25 milliGRAM(s) Oral four times a day  pantoprazole    Tablet 40 milliGRAM(s) Oral before breakfast  predniSONE   Tablet 15 milliGRAM(s) Oral daily  simvastatin 40 milliGRAM(s) Oral at bedtime  sodium chloride 0.9%. 1000 milliLiter(s) (75 mL/Hr) IV Continuous <Continuous>  tamsulosin 0.8 milliGRAM(s) Oral at bedtime  valACYclovir 500 milliGRAM(s) Oral daily    MEDICATIONS  (PRN):  acetaminophen     Tablet .. 650 milliGRAM(s) Oral every 6 hours PRN Temp greater or equal to 38C (100.4F), Mild Pain (1 - 3)  aluminum hydroxide/magnesium hydroxide/simethicone Suspension 30 milliLiter(s) Oral every 4 hours PRN Dyspepsia  melatonin 5 milliGRAM(s) Oral at bedtime PRN Insomnia  ondansetron Injectable 4 milliGRAM(s) IV Push every 8 hours PRN Nausea and/or Vomiting    Vital Signs Last 24 Hrs  T(C): 36.8 (18 Jul 2023 10:52), Max: 36.8 (18 Jul 2023 04:58)  T(F): 98.2 (18 Jul 2023 10:52), Max: 98.3 (18 Jul 2023 04:58)  HR: 104 (18 Jul 2023 10:52) (99 - 109)  BP: 135/74 (18 Jul 2023 10:52) (119/65 - 158/72)  BP(mean): --  RR: 18 (18 Jul 2023 04:58) (18 - 18)  SpO2: 93% (18 Jul 2023 10:52) (93% - 98%)    Parameters below as of 18 Jul 2023 10:52  Patient On (Oxygen Delivery Method): room air        I&O's Summary    17 Jul 2023 07:01  -  18 Jul 2023 07:00  --------------------------------------------------------  IN: 480 mL / OUT: 0 mL / NET: 480 mL    18 Jul 2023 07:01  -  18 Jul 2023 16:30  --------------------------------------------------------  IN: 480 mL / OUT: 0 mL / NET: 480 mL          Physical Exam:   GENERAL: NAD, well-groomed, well-developed  HEENT: JONY/   Atraumatic, Normocephalic  ENMT: No tonsillar erythema, exudates, or enlargement; Moist mucous membranes, Good dentition, No lesions  NECK: Supple, No JVD, Normal thyroid  CHEST/LUNG: Clear to auscultaion  CVS: Regular rate and rhythm; No murmurs, rubs, or gallops  GI: : Soft, Nontender, Nondistended; Bowel sounds present  NERVOUS SYSTEM:  Alert & Oriented X3  EXTREMITIES:  2+ Peripheral Pulses, No clubbing, cyanosis, or edema  LYMPH: No lymphadenopathy noted  SKIN: No rashes or lesions  ENDOCRINOLOGY: No Thyromegaly  PSYCH: Appropriate    Labs:  30                            11.2   11.80 )-----------( 96       ( 18 Jul 2023 06:19 )             34.4                         11.3   12.82 )-----------( 143      ( 17 Jul 2023 07:12 )             34.3                         12.1   12.43 )-----------( 205      ( 16 Jul 2023 05:54 )             37.0                         11.8   13.10 )-----------( 231      ( 15 Jul 2023 06:02 )             36.7     07-18    138  |  100  |  16  ----------------------------<  97  4.1   |  26  |  1.09  07-16    139  |  102  |  21  ----------------------------<  99  4.2   |  26  |  1.14  07-15    139  |  101  |  18  ----------------------------<  102<H>  4.3   |  26  |  1.11    Ca    9.0      18 Jul 2023 06:27    TPro  5.3<L>  /  Alb  2.7<L>  /  TBili  0.4  /  DBili  x   /  AST  24  /  ALT  33  /  AlkPhos  52  07-18  TPro  5.2<L>  /  Alb  2.7<L>  /  TBili  0.4  /  DBili  x   /  AST  15  /  ALT  29  /  AlkPhos  52  07-15    CAPILLARY BLOOD GLUCOSE          LIVER FUNCTIONS - ( 18 Jul 2023 06:27 )  Alb: 2.7 g/dL / Pro: 5.3 g/dL / ALK PHOS: 52 U/L / ALT: 33 U/L / AST: 24 U/L / GGT: x             Urinalysis Basic - ( 18 Jul 2023 06:27 )    Color: x / Appearance: x / SG: x / pH: x  Gluc: 97 mg/dL / Ketone: x  / Bili: x / Urobili: x   Blood: x / Protein: x / Nitrite: x   Leuk Esterase: x / RBC: x / WBC x   Sq Epi: x / Non Sq Epi: x / Bacteria: x            RECENT CULTURES:  07-14 @ 10:00 .Blood Blood-Peripheral                No growth at 4 days    07-14 @ 09:58 Clean Catch Clean Catch (Midstream)         rad< from: CT Angio Chest PE Protocol w/ IV Cont (07.17.23 @ 17:39) >    ACC: [id number] EXAM:  CT ANGIO CHEST PULM ART [facility]   ORDERED BY: NOLA BROWNING     PROCEDURE DATE:  07/17/2023          INTERPRETATION:  CLINICAL INFORMATION: Fever.    COMPARISON: Chest x-ray from 7/14/2023.    CONTRAST/COMPLICATIONS:  IV Contrast: Omnipaque 350  70 cc administered   30 cc discarded  Oral Contrast: NONE  Complications: None reported at time of study completion    PROCEDURE:  CT Angiogram of the chest was obtained with intravenous contrast. Three   dimensional maximum intensityprojection (MIP) images were generated.    FINDINGS:    PULMONARY ANGIOGRAM: Contrast bolus is satisfactory. No main, right main,   left main, lobar or segmental pulmonary embolism. Limited evaluation of   subsegmental pulmonary arteries secondary tomixing artifact.    LYMPH NODES: Small axillary and mediastinal lymph nodes, for example a   subcarinal lymph node measures 1.4 cm.    HEART/VASCULATURE: The heart is normal in size. No pericardial effusion.   The aorta is normal in caliber.  There are aortic, aortic valve, and   coronary artery calcifications.    AIRWAYS/LUNGS/PLEURA: Linear opacities in the bilateral lower lobes and   to a lesser extent the right middle lobe and lingula, suggestive of   atelectasis. A thin wall air cyst in the superior lingula. No pleural   effusion.    UPPER ABDOMEN: Unremarkable.    BONES/SOFT TISSUES: Degenerative changes.    IMPRESSION:  No pulmonary embolism.    --- End of Report ---           KARLA CODY MD; Resident Radiologist  This document has been electronically signed.  TOM ANDERSON MD; Attending Radiologist  This document has been electronically signed. Jul 18 2023  9:53AM    < end of copied text >         No growth    07-14 @ 09:23 .Blood Blood-Peripheral                No growth at 4 days          RESPIRATORY CULTURES:          Studies  Chest X-RAY  CT SCAN Chest   Venous Dopplers: LE:   CT Abdomen  Others

## 2023-07-18 NOTE — PROGRESS NOTE ADULT - SUBJECTIVE AND OBJECTIVE BOX
INTERVAL HPI/OVERNIGHT EVENTS: Patient reports feeling less itchy.     MEDICATIONS  (STANDING):  atovaquone  Suspension 1500 milliGRAM(s) Oral daily  chlorhexidine 2% Cloths 1 Application(s) Topical daily  diphenhydrAMINE 25 milliGRAM(s) Oral at bedtime  hydrOXYzine hydrochloride 25 milliGRAM(s) Oral four times a day  pantoprazole    Tablet 40 milliGRAM(s) Oral before breakfast  predniSONE   Tablet 15 milliGRAM(s) Oral daily  simvastatin 40 milliGRAM(s) Oral at bedtime  sodium chloride 0.9%. 1000 milliLiter(s) (75 mL/Hr) IV Continuous <Continuous>  tamsulosin 0.8 milliGRAM(s) Oral at bedtime  valACYclovir 500 milliGRAM(s) Oral daily    MEDICATIONS  (PRN):  acetaminophen     Tablet .. 650 milliGRAM(s) Oral every 6 hours PRN Temp greater or equal to 38C (100.4F), Mild Pain (1 - 3)  aluminum hydroxide/magnesium hydroxide/simethicone Suspension 30 milliLiter(s) Oral every 4 hours PRN Dyspepsia  melatonin 5 milliGRAM(s) Oral at bedtime PRN Insomnia  ondansetron Injectable 4 milliGRAM(s) IV Push every 8 hours PRN Nausea and/or Vomiting      Allergies    No Known Allergies    Intolerances        REVIEW OF SYSTEMS      General: no fevers/chills, no NS	    Skin: see HPI  	  Ophthalmologic: no eye pain or change in vision    Genitourinary: no dysuria or hematuria    Musculoskeletal: no joint pains or weakness	    Neurological:no weakness or tingling          Vital Signs Last 24 Hrs  T(C): 36.8 (18 Jul 2023 20:42), Max: 36.8 (18 Jul 2023 04:58)  T(F): 98.3 (18 Jul 2023 20:42), Max: 98.3 (18 Jul 2023 04:58)  HR: 108 (18 Jul 2023 20:42) (99 - 108)  BP: 164/70 (18 Jul 2023 20:42) (119/65 - 164/70)  BP(mean): --  RR: 18 (18 Jul 2023 20:42) (18 - 18)  SpO2: 92% (18 Jul 2023 20:42) (92% - 94%)    Parameters below as of 18 Jul 2023 20:42  Patient On (Oxygen Delivery Method): room air        PHYSICAL EXAM:   The patient was alert and oriented X 3, well nourished, and in no  apparent distress.  There was no visible lymphadenopathy.    Of note on skin exam:   erythrodermic, improving from exam yesterday  BLE pitting edema  ectropion b/l   No palpable inguinal axillary or cervical lymphadenopathy      LABS:                        11.2   11.80 )-----------( 96       ( 18 Jul 2023 06:19 )             34.4     07-18    138  |  100  |  16  ----------------------------<  97  4.1   |  26  |  1.09    Ca    9.0      18 Jul 2023 06:27    TPro  5.3<L>  /  Alb  2.7<L>  /  TBili  0.4  /  DBili  x   /  AST  24  /  ALT  33  /  AlkPhos  52  07-18      Urinalysis Basic - ( 18 Jul 2023 06:27 )    Color: x / Appearance: x / SG: x / pH: x  Gluc: 97 mg/dL / Ketone: x  / Bili: x / Urobili: x   Blood: x / Protein: x / Nitrite: x   Leuk Esterase: x / RBC: x / WBC x   Sq Epi: x / Non Sq Epi: x / Bacteria: x

## 2023-07-18 NOTE — CHART NOTE - NSCHARTNOTEFT_GEN_A_CORE
Discussed with Dermatology (Dr. Juliet Varela) and will continue Prednisone 15mg PO QD until out-patient Dermatology follow-up.    Bridgette Carlos

## 2023-07-18 NOTE — PROGRESS NOTE ADULT - ASSESSMENT
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) and Diffuse Erythroderma (initial rash starting February 2023, worsened with May 2023) currently on prednisone taper with first dose Skyrizi ( RISANKIZUMAB-RZAA-  AN IL-23 inhibitor_  # Fever r/o Sepsis:  # Diffuse Erythroderma:  # HLD:  # Herpes:  # DVT ppx:    # Fever r/o Sepsis:-   -he has non covid coronavirus infection; that could  be th  reason for his fever:   -however his chest xray is mostly clar: which does not explain his hypoxia:   -he also has inability to take deep breaths;   -In addition he is hypoxic with clear chest radiograph and have been on high dose steroids without bactrim prophylaxis  -would do cta and see th elung parenchyma:   -send fungitell and sputum for pcp and LDH   -cont to use o2 to keep his sao2 above 90% all the time:   do dopplers as he has significant swelling of his legs  -dw ID   -his ct scan has no pe:  dopplers are negative for pe   and he has linear atelectasis on both sides:  today he is able to do  incentive spirometry upto 3000cc:  on room air : on ambulation yesterday he did not desaturate  -he does desaturate whilesleeping : need to monitor his overnight oximetry  :  no pcp suggestin on ct chest  : fungitell pending  # Diffuse Erythroderma:  -he has been on high dosages of steroids which are gradually being taperd:   # HLD:  -on simvastatin  # Herpes:  -on valtrex  # DVT ppx:  -on lovenox:    dw id and ACP:

## 2023-07-18 NOTE — PHYSICAL THERAPY INITIAL EVALUATION ADULT - NSPTDISCHREC_GEN_A_CORE
DC home with outpatient PT services, assist from spouse as needed, no DME needs at thsi time, +flight of stairs in home, MARIANO wen./Outpatient PT

## 2023-07-18 NOTE — PROGRESS NOTE ADULT - SUBJECTIVE AND OBJECTIVE BOX
Patient is a 78y old  Male who presents with a chief complaint of Fever (18 Jul 2023 09:34)      SUBJECTIVE / OVERNIGHT EVENTS:  Patient seen and examined.   Feels the same.      Vital Signs Last 24 Hrs  T(C): 36.8 (18 Jul 2023 10:52), Max: 36.8 (18 Jul 2023 04:58)  T(F): 98.2 (18 Jul 2023 10:52), Max: 98.3 (18 Jul 2023 04:58)  HR: 104 (18 Jul 2023 10:52) (99 - 109)  BP: 135/74 (18 Jul 2023 10:52) (119/65 - 158/72)  BP(mean): --  RR: 18 (18 Jul 2023 04:58) (18 - 18)  SpO2: 93% (18 Jul 2023 10:52) (93% - 98%)    Parameters below as of 18 Jul 2023 10:52  Patient On (Oxygen Delivery Method): room air      I&O's Summary    17 Jul 2023 07:01  -  18 Jul 2023 07:00  --------------------------------------------------------  IN: 480 mL / OUT: 0 mL / NET: 480 mL    18 Jul 2023 07:01  -  18 Jul 2023 12:10  --------------------------------------------------------  IN: 240 mL / OUT: 0 mL / NET: 240 mL      PHYSICAL EXAM:  GENERAL: NAD, well-developed, comfortable  HEAD:  Atraumatic, Normocephalic  EYES: + bilateral blepharitis  NECK: Supple, No JVD  CHEST/LUNG: Clear to auscultation bilaterally; No wheeze  HEART: Regular rate and rhythm; No murmurs, rubs, or gallops  ABDOMEN: Soft, Nontender, Nondistended; Bowel sounds present  NEURO: AAOx3, no focal weakness, 5/5 b/l extremity strength, b/l knee no arthritis, no effusion   EXTREMITIES:  2+ Peripheral Pulses, No clubbing, cyanosis, bilateral LE edema  SKIN: generalized severe erythema    LABS:                        11.2   11.80 )-----------( 96       ( 18 Jul 2023 06:19 )             34.4     07-18    138  |  100  |  16  ----------------------------<  97  4.1   |  26  |  1.09    Ca    9.0      18 Jul 2023 06:27    TPro  5.3<L>  /  Alb  2.7<L>  /  TBili  0.4  /  DBili  x   /  AST  24  /  ALT  33  /  AlkPhos  52  07-18      CAPILLARY BLOOD GLUCOSE            Urinalysis Basic - ( 18 Jul 2023 06:27 )    Color: x / Appearance: x / SG: x / pH: x  Gluc: 97 mg/dL / Ketone: x  / Bili: x / Urobili: x   Blood: x / Protein: x / Nitrite: x   Leuk Esterase: x / RBC: x / WBC x   Sq Epi: x / Non Sq Epi: x / Bacteria: x        RADIOLOGY & ADDITIONAL TESTS:    Imaging Personally Reviewed:  [x] YES  [ ] NO    Consultant(s) Notes Reviewed:  [x] YES  [ ] NO      MEDICATIONS  (STANDING):  atovaquone  Suspension 1500 milliGRAM(s) Oral daily  chlorhexidine 2% Cloths 1 Application(s) Topical daily  diphenhydrAMINE 25 milliGRAM(s) Oral at bedtime  enoxaparin Injectable 40 milliGRAM(s) SubCutaneous every 24 hours  hydrOXYzine hydrochloride 25 milliGRAM(s) Oral four times a day  pantoprazole    Tablet 40 milliGRAM(s) Oral before breakfast  predniSONE   Tablet 15 milliGRAM(s) Oral daily  simvastatin 40 milliGRAM(s) Oral at bedtime  tamsulosin 0.8 milliGRAM(s) Oral at bedtime  valACYclovir 500 milliGRAM(s) Oral daily    MEDICATIONS  (PRN):  acetaminophen     Tablet .. 650 milliGRAM(s) Oral every 6 hours PRN Temp greater or equal to 38C (100.4F), Mild Pain (1 - 3)  aluminum hydroxide/magnesium hydroxide/simethicone Suspension 30 milliLiter(s) Oral every 4 hours PRN Dyspepsia  melatonin 5 milliGRAM(s) Oral at bedtime PRN Insomnia  ondansetron Injectable 4 milliGRAM(s) IV Push every 8 hours PRN Nausea and/or Vomiting      Care Discussed with Consultants/Other Providers [x] YES  [ ] NO    HEALTH ISSUES - PROBLEM Dx:  Hearing loss

## 2023-07-18 NOTE — PROGRESS NOTE ADULT - SUBJECTIVE AND OBJECTIVE BOX
[facility] INFECTIOUS DISEASES  RACHELLE Manzanares Y. Patel, S. Shah, G. Shawn  [phone]  ([phone] - weekdays after 5pm and weekends)    Name: ZAHRA LONG  Age/Gender: 78y Male  MRN: [id number]    Interval History:  Patient seen and examined this morning.   Denies fever, chills, chest pain, cough, dyspnea, n/v/d.   Notes reviewed. No concerning overnight events.  Afebrile.   Allergies: No Known Allergies      Objective:  Vitals:   T(F): 98.3 (07-18-23 @ 04:58), Max: 98.3 (07-18-23 @ 04:58)  HR: 99 (07-18-23 @ 04:58) (99 - 109)  BP: 119/65 (07-18-23 @ 04:58) (119/65 - 158/72)  RR: 18 (07-18-23 @ 04:58) (18 - 18)  SpO2: 94% (07-18-23 @ 04:58) (90% - 98%)  Physical Examination:  General: no acute distress, NC not in nostrils  HEENT: NC/AT, EOMI, anicteric, neck supple  Cardio: S1, S2 present, normal rate  Resp: clear to auscultation bilaterally  Abd: soft, NT, ND, + BS  Neuro: AAOx3, no obvious focal deficits  Ext: no edema, no cyanosis, moving extremities  Skin: generalized erythematous rash with dry scaling skin  Psych: appropriate affect and mood for situation  Lines: PIV    Laboratory Studies:  CBC:                       11.2   11.80 )-----------( 96       ( 18 Jul 2023 06:19 )             34.4     WBC Trend:  11.80 07-18-23 @ 06:19  12.82 07-17-23 @ 07:12  12.43 07-16-23 @ 05:54  13.10 07-15-23 @ 06:02  13.23 07-14-23 @ 09:47    CMP: 07-18    138  |  100  |  16  ----------------------------<  97  4.1   |  26  |  1.09    Ca    9.0      18 Jul 2023 06:27    TPro  5.3<L>  /  Alb  2.7<L>  /  TBili  0.4  /  DBili  x   /  AST  24  /  ALT  33  /  AlkPhos  52  07-18    Creatinine: 1.09 mg/dL (07-18-23 @ 06:27)  Creatinine: 1.14 mg/dL (07-16-23 @ 05:53)  Creatinine: 1.11 mg/dL (07-15-23 @ 06:01)  Creatinine: 1.05 mg/dL (07-14-23 @ 09:47)    LIVER FUNCTIONS - ( 18 Jul 2023 06:27 )  Alb: 2.7 g/dL / Pro: 5.3 g/dL / ALK PHOS: 52 U/L / ALT: 33 U/L / AST: 24 U/L / GGT: x           Microbiology: reviewed   Culture - Blood (collected 07-14-23 @ 10:00)  Source: .Blood Blood-Peripheral  Preliminary Report (07-17-23 @ 15:01):    No growth at 72 Hours    Culture - Urine (collected 07-14-23 @ 09:58)  Source: Clean Catch Clean Catch (Midstream)  Final Report (07-15-23 @ 15:28):    No growth    Culture - Blood (collected 07-14-23 @ 09:23)  Source: .Blood Blood-Peripheral  Preliminary Report (07-17-23 @ 15:01):    No growth at 72 Hours    Radiology: reviewed   < from: CT Angio Chest PE Protocol w/ IV Cont (07.17.23 @ 17:39) >  INTERPRETATION:  No pulmonary embolism in the main, left main, right   main, lobar, or segmental pulmonary arteries. Limited evaluation of the   subsegmental pulmonary arteries due to motion artifact.    Bilateral lower lobe and right middle lobe consolidative opacities.    Nonspecific prominent mediastinal and bilateral hilar lymph nodes.        ******PRELIMINARY REPORT******      < end of copied text >  < from: [facility] Duplex Lower Ext Vein Scan, Bilat (07.17.23 @ 16:24) >  IMPRESSION:  No evidence of deep venous thrombosis in either lower extremity.      < end of copied text >  < from: Xray Chest 1 View- PORTABLE-Urgent (Xray Chest 1 View- PORTABLE-Urgent .) (07.17.23 @ 11:47) >  IMPRESSION:  Bibasilar subsegmental atelectasis.  Nofocal consolidation.    < end of copied text >    Medications:  acetaminophen     Tablet .. 650 milliGRAM(s) Oral every 6 hours PRN  aluminum hydroxide/magnesium hydroxide/simethicone Suspension 30 milliLiter(s) Oral every 4 hours PRN  chlorhexidine 2% Cloths 1 Application(s) Topical daily  diphenhydrAMINE 25 milliGRAM(s) Oral at bedtime  enoxaparin Injectable 40 milliGRAM(s) SubCutaneous every 24 hours  hydrOXYzine hydrochloride 25 milliGRAM(s) Oral four times a day  melatonin 5 milliGRAM(s) Oral at bedtime PRN  ondansetron Injectable 4 milliGRAM(s) IV Push every 8 hours PRN  pantoprazole    Tablet 40 milliGRAM(s) Oral before breakfast  simvastatin 40 milliGRAM(s) Oral at bedtime  tamsulosin 0.8 milliGRAM(s) Oral at bedtime  valACYclovir 500 milliGRAM(s) Oral daily    Current Antimicrobials:  valACYclovir 500 milliGRAM(s) Oral daily    Prior/Completed Antimicrobials:  cefepime   IVPB  vancomycin  IVPB.   [facility] DISEASES  RACHELLE Manzanares Y. Patel, S. Shah, G. Shawn  [phone]  ([phone] - weekdays after 5pm and weekends)    Name: ZAHRA LONG  Age/Gender: 78y Male  MRN: [id number]    Interval History:  Patient seen and examined this morning.   Denies fever, chills, chest pain, cough, dyspnea, n/v/d.   Notes reviewed. No concerning overnight events.  Afebrile.   Allergies: No Known Allergies      Objective:  Vitals:   T(F): 98.3 (07-18-23 @ 04:58), Max: 98.3 (07-18-23 @ 04:58)  HR: 99 (07-18-23 @ 04:58) (99 - 109)  BP: 119/65 (07-18-23 @ 04:58) (119/65 - 158/72)  RR: 18 (07-18-23 @ 04:58) (18 - 18)  SpO2: 94% (07-18-23 @ 04:58) (90% - 98%)  Physical Examination:  General: no acute distress, NC not in nostrils  HEENT: NC/AT, EOMI, anicteric, neck supple  Cardio: S1, S2 present, normal rate  Resp: clear to auscultation bilaterally  Abd: soft, NT, ND, + BS  Neuro: AAOx3, no obvious focal deficits  Ext: LE edema, no cyanosis, moving extremities  Skin: generalized erythematous rash with dry scaling skin  Psych: appropriate affect and mood for situation  Lines: PIV    Laboratory Studies:  CBC:                       11.2   11.80 )-----------( 96       ( 18 Jul 2023 06:19 )             34.4     WBC Trend:  11.80 07-18-23 @ 06:19  12.82 07-17-23 @ 07:12  12.43 07-16-23 @ 05:54  13.10 07-15-23 @ 06:02  13.23 07-14-23 @ 09:47    CMP: 07-18    138  |  100  |  16  ----------------------------<  97  4.1   |  26  |  1.09    Ca    9.0      18 Jul 2023 06:27    TPro  5.3<L>  /  Alb  2.7<L>  /  TBili  0.4  /  DBili  x   /  AST  24  /  ALT  33  /  AlkPhos  52  07-18    Creatinine: 1.09 mg/dL (07-18-23 @ 06:27)  Creatinine: 1.14 mg/dL (07-16-23 @ 05:53)  Creatinine: 1.11 mg/dL (07-15-23 @ 06:01)  Creatinine: 1.05 mg/dL (07-14-23 @ 09:47)    LIVER FUNCTIONS - ( 18 Jul 2023 06:27 )  Alb: 2.7 g/dL / Pro: 5.3 g/dL / ALK PHOS: 52 U/L / ALT: 33 U/L / AST: 24 U/L / GGT: x           Microbiology: reviewed   Culture - Blood (collected 07-14-23 @ 10:00)  Source: .Blood Blood-Peripheral  Preliminary Report (07-17-23 @ 15:01):    No growth at 72 Hours    Culture - Urine (collected 07-14-23 @ 09:58)  Source: Clean Catch Clean Catch (Midstream)  Final Report (07-15-23 @ 15:28):    No growth    Culture - Blood (collected 07-14-23 @ 09:23)  Source: .Blood Blood-Peripheral  Preliminary Report (07-17-23 @ 15:01):    No growth at 72 Hours    Radiology: reviewed   < from: CT Angio Chest PE Protocol w/ IV Cont (07.17.23 @ 17:39) >  INTERPRETATION:  No pulmonary embolism in the main, left main, right   main, lobar, or segmental pulmonary arteries. Limited evaluation of the   subsegmental pulmonary arteries due to motion artifact.    Bilateral lower lobe and right middle lobe consolidative opacities.    Nonspecific prominent mediastinal and bilateral hilar lymph nodes.        ******PRELIMINARY REPORT******      < end of copied text >  < from: [facility] Duplex Lower Ext Vein Scan, Bilat (07.17.23 @ 16:24) >  IMPRESSION:  No evidence of deep venous thrombosis in either lower extremity.      < end of copied text >  < from: Xray Chest 1 View- PORTABLE-Urgent (Xray Chest 1 View- PORTABLE-Urgent .) (07.17.23 @ 11:47) >  IMPRESSION:  Bibasilar subsegmental atelectasis.  Nofocal consolidation.    < end of copied text >    Medications:  acetaminophen     Tablet .. 650 milliGRAM(s) Oral every 6 hours PRN  aluminum hydroxide/magnesium hydroxide/simethicone Suspension 30 milliLiter(s) Oral every 4 hours PRN  chlorhexidine 2% Cloths 1 Application(s) Topical daily  diphenhydrAMINE 25 milliGRAM(s) Oral at bedtime  enoxaparin Injectable 40 milliGRAM(s) SubCutaneous every 24 hours  hydrOXYzine hydrochloride 25 milliGRAM(s) Oral four times a day  melatonin 5 milliGRAM(s) Oral at bedtime PRN  ondansetron Injectable 4 milliGRAM(s) IV Push every 8 hours PRN  pantoprazole    Tablet 40 milliGRAM(s) Oral before breakfast  simvastatin 40 milliGRAM(s) Oral at bedtime  tamsulosin 0.8 milliGRAM(s) Oral at bedtime  valACYclovir 500 milliGRAM(s) Oral daily    Current Antimicrobials:  valACYclovir 500 milliGRAM(s) Oral daily    Prior/Completed Antimicrobials:  cefepime   IVPB  vancomycin  IVPB.

## 2023-07-18 NOTE — PHYSICAL THERAPY INITIAL EVALUATION ADULT - PERTINENT HX OF CURRENT PROBLEM, REHAB EVAL
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 Presents to [facility] for worsening fever. Pt states he was recently started on Skyrizi and since then has been experiencing increased weakness, fatigue, bilateral LE edema and fever last night (temp max 102.5) which was relieved by Tylenol. Pt and spouse present to the ED for further evaluation. Denies any chest pain, n/v/d or recent sick contacts. Hospital course: Sepsis 2* to Seasonal Coronavirus - vanco /cefepime IV x1 in ED, RVP (seasonal coronavirus- not COVID), Bld Cx and  UCx no growth CXR clear : Monitor off abx, 7/14 -  CXR: Bibasilar subsegmental atelectasis. no infiltrate/consolidation. VA duplex: No evidence of deep venous thrombosis in either lower extremity. CTA chest: No pulmonary embolism in the main, left main, right main, lobar, or segmental pulmonary arteries. Limited evaluation of the subsegmental pulmonary arteries due to motion artifact. Bilateral lower lobe and right middle lobe consolidative opacities. Nonspecific prominent mediastinal and bilateral hilar lymph nodes. ENT was consulted for right decreased hearing in his right ear. Pt thinks he has water in his ear. Pt recently had his ears cleaned by Dr. Robledo (PCP). Pt denies vertigo, ear pain, ear discharge, tinnitus.

## 2023-07-18 NOTE — PHYSICAL THERAPY INITIAL EVALUATION ADULT - ACTIVE RANGE OF MOTION EXAMINATION, REHAB EVAL
[FreeTextEntry2] : Breast Cancer
R wrist IV placement/guaze dressing limiting wrist flex/Left UE Active ROM was WFL (within functional limits)/Right UE Active ROM was WFL (within functional limits)/Left LE Active ROM was WFL (within functional limits)/Right LE Active ROM was WFL (within functional limits)

## 2023-07-18 NOTE — PHYSICAL THERAPY INITIAL EVALUATION ADULT - GENERAL OBSERVATIONS, REHAB EVAL
Pt a/w fever to 102,+ Sepsis 2* to Seasonal Coronavirus (not COVID), hx diffuse erythroderma, followed by derm, +prednisone and Skyrizi meds. Pt received sitting in bedside chair, +spouse at bedside, +tele, +IVL, +reddened skin t/o, moistened with vaseline, A&OX4, pleasant and cooperative.

## 2023-07-18 NOTE — PROGRESS NOTE ADULT - ASSESSMENT
Patient is a 78 year old male with PMHx Unspecified Autoimmune Skin Condition (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 Presents to [facility] for worsening fever.    # Fever r/o viral URI:  - CXR with no evidence of acute pulmonary disease.  - RVP +seasonal coronavirus (not COVID)  - Monitor temps/WBC  - S/p Cefepime/Vanco x1 in ED-> monitor off abx per ID  - BCx/UCx with NGTD  - Supportive care  - ID following    # Diffuse Erythroderma:  # LE edema  - C/w Prednisone taper -> 20MG till Monday  -F/u Dermatology consulted   -Doppler pending     #Hypoxia   -cxr neg ; cta chest negative for PE  -likely due to atelectasis , deconditioning   -incentive spirometer  -increasing mobilization   -oxygenation improving     #Sinus Tachycardia   -f/u echo   -PE ruled out   -no fever    #Left Ear Fullness  -f/u ENT consult      # HLD:  - C/w Statin    # Herpes:  - C/w Valtrex    # DVT ppx:  - Lovenox    [facility]  [phone]

## 2023-07-18 NOTE — PROGRESS NOTE ADULT - ASSESSMENT
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     SIRS/FEVER/ERYTHRODERMA  Presentation with pronounced scaling of skin very suggestive of erythrodermic psoriasis and unclear etiology of acute  leading to hospitalization and fever  very curious history of Primary Cutaneous B-cell Lymphoma   Infection in June 2023 at [facility] RVP+ for seasonal coronavirus -- now again positive on this admission   June 2022 was SARS-CoV-2 here at [facility] ER  Leukocytosis -- likely reactive d/t steroid for diffuse erythroderma  Fever may be due to recent Skyrizi as this is a humanized MAB, seasonal coronavirus, pbut low suspicion for bacterial sepsis   Hypoxia - may be viral etiology, no PE; rule out PCP given patient has been on steroids for ~2 months without PCP ppx   - CTA chest prelim report with no PE seen, b/l LL and RML consolidative opacities, nonspecific prominent mediastinal and b/l hilar lymph nodes; on review of imaging - do not see the typical diffuse ground glass opacities that are seen with PCP   - LDH wnl; afebrile, WBC downtrending, denies having any cough or sputum production    Recommendations:  -follow up fungitedomonique, in process   -follow up CTA chest official report  -can continue to observe off antibiotics   -s/p steroid taper -- Dermatology following    -- if restarted on steroids, can start on atovaquone for ppx   -continue supportive care       Patrick Anderson M.D.  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     SIRS/FEVER/ERYTHRODERMA  Presentation with pronounced scaling of skin very suggestive of erythrodermic psoriasis and unclear etiology of acute  leading to hospitalization and fever  very curious history of Primary Cutaneous B-cell Lymphoma   Infection in June 2023 at [facility] RVP+ for seasonal coronavirus -- now again positive on this admission   June 2022 was SARS-CoV-2 here at [facility] ER  Leukocytosis -- likely reactive d/t steroid for diffuse erythroderma  Fever may be due to recent Skyrizi as this is a humanized MAB, seasonal coronavirus, pbut low suspicion for bacterial sepsis   Hypoxia - may be viral etiology, no PE; rule out PCP given patient has been on steroids for ~2 months without PCP ppx   - CTA chest prelim report with no PE seen, b/l LL and RML consolidative opacities, nonspecific prominent mediastinal and b/l hilar lymph nodes; on review of imaging - do not see the typical diffuse ground glass opacities that are seen with PCP   - LDH wnl; afebrile, WBC downtrending, denies having any cough or sputum production    Recommendations:  -follow up fungitell, in process   -follow up CTA chest official report  -can continue to observe off antibiotics   -Dermatology following - continuing on prednisone taper   -- start on atovaquone 1500mg PO daily for PCP ppx     (reported he was told he may have a sulfa allergy after taking diuretic with sulfa in it)  -continue supportive care       Patrick Anderson M.D.  [facility], Division of Infectious Diseases  [phone]  After 5pm on weekdays and all day on weekends - please call [phone]

## 2023-07-18 NOTE — PHYSICAL THERAPY INITIAL EVALUATION ADULT - ADDITIONAL COMMENTS
Pt resides with his wife and is independent with adl's and personal care. +6 steps Pt resides with his wife in a home, enters through the garage and has 13 steps to negotiate with 1 HR and is independent with adl's and personal care, +walk in shower but enjoys tub baths 2* skin regimen tx; No DME at home, PTA ind with driving (wife recently driving due to recent illness), +glasses, +RH. Likes to go grocery shopping with spouse

## 2023-07-19 ENCOUNTER — NON-APPOINTMENT (OUTPATIENT)
Age: 78
End: 2023-07-19

## 2023-07-19 LAB
ANION GAP SERPL CALC-SCNC: 11 MMOL/L — SIGNIFICANT CHANGE UP (ref 5–17)
APTT BLD: 27.2 SEC — LOW (ref 27.5–35.5)
BASOPHILS # BLD AUTO: 0.04 K/UL — SIGNIFICANT CHANGE UP (ref 0–0.2)
BASOPHILS NFR BLD AUTO: 0.3 % — SIGNIFICANT CHANGE UP (ref 0–2)
BUN SERPL-MCNC: 17 MG/DL — SIGNIFICANT CHANGE UP (ref 7–23)
CALCIUM SERPL-MCNC: 8.7 MG/DL — SIGNIFICANT CHANGE UP (ref 8.4–10.5)
CHLORIDE SERPL-SCNC: 104 MMOL/L — SIGNIFICANT CHANGE UP (ref 96–108)
CLOSURE TME COLL+EPINEP BLD: 45 K/UL — LOW (ref 150–400)
CO2 SERPL-SCNC: 25 MMOL/L — SIGNIFICANT CHANGE UP (ref 22–31)
CREAT SERPL-MCNC: 0.98 MG/DL — SIGNIFICANT CHANGE UP (ref 0.5–1.3)
CULTURE RESULTS: SIGNIFICANT CHANGE UP
CULTURE RESULTS: SIGNIFICANT CHANGE UP
EGFR: 79 ML/MIN/1.73M2 — SIGNIFICANT CHANGE UP
EOSINOPHIL # BLD AUTO: 0.09 K/UL — SIGNIFICANT CHANGE UP (ref 0–0.5)
EOSINOPHIL NFR BLD AUTO: 0.7 % — SIGNIFICANT CHANGE UP (ref 0–6)
FUNGITELL: 94 PG/ML
GLUCOSE SERPL-MCNC: 182 MG/DL — HIGH (ref 70–99)
HCT VFR BLD CALC: 34.3 % — LOW (ref 39–50)
HGB BLD-MCNC: 11 G/DL — LOW (ref 13–17)
IMM GRANULOCYTES NFR BLD AUTO: 4.2 % — HIGH (ref 0–0.9)
INR BLD: 1.07 RATIO — SIGNIFICANT CHANGE UP (ref 0.88–1.16)
LDH SERPL L TO P-CCNC: 188 U/L — SIGNIFICANT CHANGE UP (ref 50–242)
LYMPHOCYTES # BLD AUTO: 0.73 K/UL — LOW (ref 1–3.3)
LYMPHOCYTES # BLD AUTO: 6 % — LOW (ref 13–44)
MCHC RBC-ENTMCNC: 30.5 PG — SIGNIFICANT CHANGE UP (ref 27–34)
MCHC RBC-ENTMCNC: 32.1 GM/DL — SIGNIFICANT CHANGE UP (ref 32–36)
MCV RBC AUTO: 95 FL — SIGNIFICANT CHANGE UP (ref 80–100)
MONOCYTES # BLD AUTO: 0.5 K/UL — SIGNIFICANT CHANGE UP (ref 0–0.9)
MONOCYTES NFR BLD AUTO: 4.1 % — SIGNIFICANT CHANGE UP (ref 2–14)
NEUTROPHILS # BLD AUTO: 10.37 K/UL — HIGH (ref 1.8–7.4)
NEUTROPHILS NFR BLD AUTO: 84.7 % — HIGH (ref 43–77)
NRBC # BLD: 0 /100 WBCS — SIGNIFICANT CHANGE UP (ref 0–0)
PLATELET # BLD AUTO: 43 K/UL — LOW (ref 150–400)
POTASSIUM SERPL-MCNC: 4.1 MMOL/L — SIGNIFICANT CHANGE UP (ref 3.5–5.3)
POTASSIUM SERPL-SCNC: 4.1 MMOL/L — SIGNIFICANT CHANGE UP (ref 3.5–5.3)
PROTHROM AB SERPL-ACNC: 12.4 SEC — SIGNIFICANT CHANGE UP (ref 10.5–13.4)
RBC # BLD: 3.61 M/UL — LOW (ref 4.2–5.8)
RBC # FLD: 12.2 % — SIGNIFICANT CHANGE UP (ref 10.3–14.5)
SODIUM SERPL-SCNC: 140 MMOL/L — SIGNIFICANT CHANGE UP (ref 135–145)
SPECIMEN SOURCE: SIGNIFICANT CHANGE UP
SPECIMEN SOURCE: SIGNIFICANT CHANGE UP
WBC # BLD: 12.24 K/UL — HIGH (ref 3.8–10.5)
WBC # FLD AUTO: 12.24 K/UL — HIGH (ref 3.8–10.5)

## 2023-07-19 PROCEDURE — 99223 1ST HOSP IP/OBS HIGH 75: CPT

## 2023-07-19 RX ORDER — ATOVAQUONE 750 MG/5ML
10 SUSPENSION ORAL
Qty: 300 | Refills: 0
Start: 2023-07-19 | End: 2023-08-17

## 2023-07-19 RX ADMIN — VALACYCLOVIR 500 MILLIGRAM(S): 500 TABLET, FILM COATED ORAL at 12:00

## 2023-07-19 RX ADMIN — ATOVAQUONE 1500 MILLIGRAM(S): 750 SUSPENSION ORAL at 17:17

## 2023-07-19 RX ADMIN — SODIUM CHLORIDE 75 MILLILITER(S): 9 INJECTION INTRAMUSCULAR; INTRAVENOUS; SUBCUTANEOUS at 05:29

## 2023-07-19 RX ADMIN — Medication 25 MILLIGRAM(S): at 21:19

## 2023-07-19 RX ADMIN — Medication 15 MILLIGRAM(S): at 05:22

## 2023-07-19 RX ADMIN — CHLORHEXIDINE GLUCONATE 1 APPLICATION(S): 213 SOLUTION TOPICAL at 11:58

## 2023-07-19 RX ADMIN — SIMVASTATIN 40 MILLIGRAM(S): 20 TABLET, FILM COATED ORAL at 21:21

## 2023-07-19 RX ADMIN — PANTOPRAZOLE SODIUM 40 MILLIGRAM(S): 20 TABLET, DELAYED RELEASE ORAL at 05:21

## 2023-07-19 RX ADMIN — TAMSULOSIN HYDROCHLORIDE 0.8 MILLIGRAM(S): 0.4 CAPSULE ORAL at 21:20

## 2023-07-19 RX ADMIN — Medication 25 MILLIGRAM(S): at 21:20

## 2023-07-19 NOTE — PROGRESS NOTE ADULT - SUBJECTIVE AND OBJECTIVE BOX
Patient is a 78y old  Male who presents with a chief complaint of Fever (19 Jul 2023 09:16)      SUBJECTIVE / OVERNIGHT EVENTS:    Patient seen and examined. denies itchy. no cp sob. no dizziness. no bleeding.      Vital Signs Last 24 Hrs  T(C): 36.4 (19 Jul 2023 10:14), Max: 36.9 (19 Jul 2023 00:10)  T(F): 97.5 (19 Jul 2023 10:14), Max: 98.5 (19 Jul 2023 04:51)  HR: 88 (19 Jul 2023 10:14) (85 - 108)  BP: 124/67 (19 Jul 2023 10:14) (106/67 - 164/70)  BP(mean): --  RR: 18 (19 Jul 2023 10:14) (18 - 18)  SpO2: 94% (19 Jul 2023 10:14) (92% - 96%)    Parameters below as of 19 Jul 2023 10:14  Patient On (Oxygen Delivery Method): room air      I&O's Summary    18 Jul 2023 07:01  -  19 Jul 2023 07:00  --------------------------------------------------------  IN: 480 mL / OUT: 400 mL / NET: 80 mL    19 Jul 2023 07:01  -  19 Jul 2023 11:53  --------------------------------------------------------  IN: 240 mL / OUT: 0 mL / NET: 240 mL        PE:  GENERAL: NAD, AAOx3  CHEST/LUNG: CTABL, No wheeze  HEART: Regular rate and rhythm; + murmur  ABDOMEN: Soft, Nontender, Nondistended; Bowel sounds present  EXTREMITIES:  2+ Peripheral Pulses, +2 le edema  SKIN: erythematous over body  NEURO: No focal deficits    LABS:                        11.0   12.24 )-----------( 43       ( 19 Jul 2023 10:00 )             34.3     07-19    140  |  104  |  17  ----------------------------<  182<H>  4.1   |  25  |  0.98    Ca    8.7      19 Jul 2023 10:00    TPro  5.3<L>  /  Alb  2.7<L>  /  TBili  0.4  /  DBili  x   /  AST  24  /  ALT  33  /  AlkPhos  52  07-18      CAPILLARY BLOOD GLUCOSE            Urinalysis Basic - ( 19 Jul 2023 10:00 )    Color: x / Appearance: x / SG: x / pH: x  Gluc: 182 mg/dL / Ketone: x  / Bili: x / Urobili: x   Blood: x / Protein: x / Nitrite: x   Leuk Esterase: x / RBC: x / WBC x   Sq Epi: x / Non Sq Epi: x / Bacteria: x        RADIOLOGY & ADDITIONAL TESTS:    Imaging Personally Reviewed:  [x] YES  [ ] NO    Consultant(s) Notes Reviewed:  [x] YES  [ ] NO    MEDICATIONS  (STANDING):  atovaquone  Suspension 1500 milliGRAM(s) Oral daily  chlorhexidine 2% Cloths 1 Application(s) Topical daily  diphenhydrAMINE 25 milliGRAM(s) Oral at bedtime  hydrOXYzine hydrochloride 25 milliGRAM(s) Oral four times a day  pantoprazole    Tablet 40 milliGRAM(s) Oral before breakfast  predniSONE   Tablet 15 milliGRAM(s) Oral daily  simvastatin 40 milliGRAM(s) Oral at bedtime  tamsulosin 0.8 milliGRAM(s) Oral at bedtime  valACYclovir 500 milliGRAM(s) Oral daily    MEDICATIONS  (PRN):  acetaminophen     Tablet .. 650 milliGRAM(s) Oral every 6 hours PRN Temp greater or equal to 38C (100.4F), Mild Pain (1 - 3)  aluminum hydroxide/magnesium hydroxide/simethicone Suspension 30 milliLiter(s) Oral every 4 hours PRN Dyspepsia  melatonin 5 milliGRAM(s) Oral at bedtime PRN Insomnia  ondansetron Injectable 4 milliGRAM(s) IV Push every 8 hours PRN Nausea and/or Vomiting      Care Discussed with Consultants/Other Providers [x] YES  [ ] NO    HEALTH ISSUES - PROBLEM Dx:  Hearing loss

## 2023-07-19 NOTE — CONSULT NOTE ADULT - SUBJECTIVE AND OBJECTIVE BOX
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 Presents to [facility] for worsening fever. Patient states he was recently started on Skyrizi and since then has been experiencing increased weakness, fatigue, bilateral LE edema and fever last night (temp max 102.5) which was relieved by Tylenol. Patient and spouse present to the ED for further evaluation. Denies any chest pain, n/v/d or recent sick contacts. (14 Jul 2023 12:37)       ROS negative except as indicated in the HPI.    PAST MEDICAL & SURGICAL HISTORY:  Cutaneous lymphoma      Erythroderm          SOCIAL HISTORY:    FAMILY HISTORY:      MEDICATIONS  (STANDING):  atovaquone  Suspension 1500 milliGRAM(s) Oral daily  chlorhexidine 2% Cloths 1 Application(s) Topical daily  diphenhydrAMINE 25 milliGRAM(s) Oral at bedtime  hydrOXYzine hydrochloride 25 milliGRAM(s) Oral four times a day  pantoprazole    Tablet 40 milliGRAM(s) Oral before breakfast  predniSONE   Tablet 15 milliGRAM(s) Oral daily  simvastatin 40 milliGRAM(s) Oral at bedtime  tamsulosin 0.8 milliGRAM(s) Oral at bedtime  valACYclovir 500 milliGRAM(s) Oral daily    MEDICATIONS  (PRN):  acetaminophen     Tablet .. 650 milliGRAM(s) Oral every 6 hours PRN Temp greater or equal to 38C (100.4F), Mild Pain (1 - 3)  aluminum hydroxide/magnesium hydroxide/simethicone Suspension 30 milliLiter(s) Oral every 4 hours PRN Dyspepsia  melatonin 5 milliGRAM(s) Oral at bedtime PRN Insomnia  ondansetron Injectable 4 milliGRAM(s) IV Push every 8 hours PRN Nausea and/or Vomiting      Allergies    No Known Allergies    Intolerances        Vital Signs Last 24 Hrs  T(C): 36.4 (19 Jul 2023 10:14), Max: 36.9 (19 Jul 2023 00:10)  T(F): 97.5 (19 Jul 2023 10:14), Max: 98.5 (19 Jul 2023 04:51)  HR: 88 (19 Jul 2023 10:14) (85 - 108)  BP: 124/67 (19 Jul 2023 10:14) (106/67 - 164/70)  BP(mean): --  RR: 18 (19 Jul 2023 10:14) (18 - 18)  SpO2: 94% (19 Jul 2023 10:14) (92% - 96%)    Parameters below as of 19 Jul 2023 10:14  Patient On (Oxygen Delivery Method): room air        PHYSICAL EXAM  General: adult in NAD  HEENT: clear oropharynx, anicteric sclera, pink conjunctiva  Neck: supple  CV: normal S1/S2 with no murmur rubs or gallops  Lungs: positive air movement b/l ant lungs, clear to auscultation, no wheezes, no rales  Abdomen: soft non-tender non-distended, no hepatosplenomegaly  Ext: no clubbing cyanosis or edema  Skin: no rashes and no petechiae  Neuro: alert and oriented X 4, no focal deficits      07-18-23 @ 07:01  -  07-19-23 @ 07:00  --------------------------------------------------------  IN: 480 mL / OUT: 400 mL / NET: 80 mL    07-19-23 @ 07:01  -  07-19-23 @ 15:33  --------------------------------------------------------  IN: 480 mL / OUT: 0 mL / NET: 480 mL      LABS:                          11.0   12.24 )-----------( 43       ( 19 Jul 2023 10:00 )             34.3         Mean Cell Volume : 95.0 fl  Mean Cell Hemoglobin : 30.5 pg  Mean Cell Hemoglobin Concentration : 32.1 gm/dL  Auto Neutrophil # : 10.37 K/uL  Auto Lymphocyte # : 0.73 K/uL  Auto Monocyte # : 0.50 K/uL  Auto Eosinophil # : 0.09 K/uL  Auto Basophil # : 0.04 K/uL  Auto Neutrophil % : 84.7 %  Auto Lymphocyte % : 6.0 %  Auto Monocyte % : 4.1 %  Auto Eosinophil % : 0.7 %  Auto Basophil % : 0.3 %      07-19    140  |  104  |  17  ----------------------------<  182<H>  4.1   |  25  |  0.98    Ca    8.7      19 Jul 2023 10:00    TPro  5.3<L>  /  Alb  2.7<L>  /  TBili  0.4  /  DBili  x   /  AST  24  /  ALT  33  /  AlkPhos  52  07-18          PT/INR - ( 19 Jul 2023 12:23 )   PT: 12.4 sec;   INR: 1.07 ratio         PTT - ( 19 Jul 2023 12:23 )  PTT:27.2 sec

## 2023-07-19 NOTE — CONSULT NOTE ADULT - ATTENDING COMMENTS
78-yr-old male with unfounded diagnosis of Cutaneous B-cell lymphoma (patient states he did not have that disease), s/o one session of RT at [facility], lesion disappeared; received Skyrizi few days ago for the diagnosis of Psoriasis. He is seen in consult for thrombocytopenia following Skyrizi. Thrombocytopenia is one of the side effects of Skyrizi. Will follow up with surveillance. Outpatient Hematology follow up. Pleas note that patient will need platelet transfusion if any invasive procedure is planned.
I was physically present for the key portions of the evaluation and management (E/M) service provided.  I agree with the above history, physical, and plan which I have reviewed and edited where appropriate.

## 2023-07-19 NOTE — CONSULT NOTE ADULT - ASSESSMENT
ZAHRA LONG is a 78y Male with a past medical history as described above.    ASSESSMENT    "Pseudo" lymphoma?   Erythroderma on immunotherapy   Psoriasis   Normocytic anemia   Thrombocytopenia     PLAN    # Thrombocytopenia   - Patient was previously following at [facility] for a skin lesion which was biopsied, but patient reports it was indeterminate and not a true lymphoma. Was treated with 1 fraction of RT to the lesion with complete response. Has been monitored by [facility], but given absence of active malignancy, was discharged from [facility] and told to follow-up with Dr. Christianson at [facility].   - Received the humanized monoclonal antibody Skyrizi prior to admission via infusion. Reports improvement in his erythroderma. This apparently has an adverse reaction that includes thrombocytopenia. Would get derm's input to ask whether they have seen this effect before and if it is common.   - Would hold DVT ppx given platelets < 50K  - Send HIT Ab  - Reviewed smear, no schistocytes no nucleated RBCs, normochromic normocytic RBCs. Neutrophil-predominant leukocytes. True thrombocytopenia.   - CBC w diff daily   - Check abdominal US for splenomegaly  - Fungitell 94 - ID to interpret   - Transfuse to maintain platelets >10K, >20K if febrile, and >50K for fevers or planned procedures  - Spoke with wife as well at bedside patient refusing biopsy and further CT scans. Can follow-up with Dr. Harris at [facility] for count checks. Denies recent weight loss and B-symptoms.     # Normocytic anemia    Likely ACI, can send iron panel + ferritin. Outpatient follow-up.     Brent Jamison MD, PGY-5  Hematology/Medical Oncology Fellow  Pager: [phone]  Available on [employer] Teams  After 5pm or on weekends please contact  to page on-call fellow

## 2023-07-19 NOTE — PROGRESS NOTE ADULT - SUBJECTIVE AND OBJECTIVE BOX
[facility] DIVISION OF INFECTIOUS DISEASES  RACHELLE Manzanares Y. Patel, S. Shah, [facility]  [phone]  ([phone] - weekdays after 5pm and weekends)    Name: ZAHRA LONG  Age/Gender: 78y Male  MRN: [id number]    Interval History:  Patient seen and examined this morning.   Feels his breathing is fine, able to use spirometer.  States he didn't sleep well overnight.  Notes reviewed. Afebrile   Allergies: No Known Allergies      Objective:  Vitals:   T(F): 98.5 (07-19-23 @ 04:51), Max: 98.5 (07-19-23 @ 04:51)  HR: 93 (07-19-23 @ 04:51) (85 - 108)  BP: 106/67 (07-19-23 @ 04:51) (106/67 - 164/70)  RR: 18 (07-19-23 @ 04:51) (18 - 18)  SpO2: 92% (07-19-23 @ 04:51) (92% - 96%)  Physical Examination:  General: no acute distress, placed NC on head  HEENT: NC/AT, anicteric, neck supple  Respiratory: no acc muscle use, breathing comfortably  Cardiovascular: S1 and S2 present  Gastrointestinal: normal appearing, nondistended  Extremities: b/l LE edema, no cyanosis  Skin: generalized erythematous rash with dry scaling skin  Psych: appropriate affect and mood for situation  Lines: PIV    Laboratory Studies:  CBC:                       11.2   11.80 )-----------( 96       ( 18 Jul 2023 06:19 )             34.4     WBC Trend:  11.80 07-18-23 @ 06:19  12.82 07-17-23 @ 07:12  12.43 07-16-23 @ 05:54  13.10 07-15-23 @ 06:02  13.23 07-14-23 @ 09:47    CMP: 07-18    138  |  100  |  16  ----------------------------<  97  4.1   |  26  |  1.09    Ca    9.0      18 Jul 2023 06:27    TPro  5.3<L>  /  Alb  2.7<L>  /  TBili  0.4  /  DBili  x   /  AST  24  /  ALT  33  /  AlkPhos  52  07-18    Creatinine: 1.09 mg/dL (07-18-23 @ 06:27)  Creatinine: 1.14 mg/dL (07-16-23 @ 05:53)  Creatinine: 1.11 mg/dL (07-15-23 @ 06:01)  Creatinine: 1.05 mg/dL (07-14-23 @ 09:47)    LIVER FUNCTIONS - ( 18 Jul 2023 06:27 )  Alb: 2.7 g/dL / Pro: 5.3 g/dL / ALK PHOS: 52 U/L / ALT: 33 U/L / AST: 24 U/L / GGT: x           Microbiology: reviewed   Culture - Blood (collected 07-14-23 @ 10:00)  Source: .Blood Blood-Peripheral  Preliminary Report (07-18-23 @ 15:01):    No growth at 4 days    Culture - Urine (collected 07-14-23 @ 09:58)  Source: Clean Catch Clean Catch (Midstream)  Final Report (07-15-23 @ 15:28):    No growth    Culture - Blood (collected 07-14-23 @ 09:23)  Source: .Blood Blood-Peripheral  Preliminary Report (07-18-23 @ 15:01):    No growth at 4 days    Radiology: reviewed     Medications:  acetaminophen     Tablet .. 650 milliGRAM(s) Oral every 6 hours PRN  aluminum hydroxide/magnesium hydroxide/simethicone Suspension 30 milliLiter(s) Oral every 4 hours PRN  atovaquone  Suspension 1500 milliGRAM(s) Oral daily  chlorhexidine 2% Cloths 1 Application(s) Topical daily  diphenhydrAMINE 25 milliGRAM(s) Oral at bedtime  hydrOXYzine hydrochloride 25 milliGRAM(s) Oral four times a day  melatonin 5 milliGRAM(s) Oral at bedtime PRN  ondansetron Injectable 4 milliGRAM(s) IV Push every 8 hours PRN  pantoprazole    Tablet 40 milliGRAM(s) Oral before breakfast  predniSONE   Tablet 15 milliGRAM(s) Oral daily  simvastatin 40 milliGRAM(s) Oral at bedtime  sodium chloride 0.9%. 1000 milliLiter(s) IV Continuous <Continuous>  tamsulosin 0.8 milliGRAM(s) Oral at bedtime  valACYclovir 500 milliGRAM(s) Oral daily    Current Antimicrobials:  atovaquone  Suspension 1500 milliGRAM(s) Oral daily  valACYclovir 500 milliGRAM(s) Oral daily    Prior/Completed Antimicrobials:  cefepime   IVPB  vancomycin  IVPB.

## 2023-07-19 NOTE — PROVIDER CONTACT NOTE (OTHER) - SITUATION
Patient went to the bathroom and upon standing up was dizzy.
Pt oral temp 100.2.
Patient refused AM Hydroxyzine Hydrochloride

## 2023-07-19 NOTE — PROGRESS NOTE ADULT - ASSESSMENT
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 Presents to [facility] for worsening fever.    # Fever   - no evidence of acute pulmonary disease  - RVP +seasonal coronavirus (not COVID)  - monitor off abx per ID  - BCx/UCx with NGTD  - Supportive care    # Diffuse Erythroderma  # LE edema  - C/w Prednisone taper per derm, outpt fu  - le doppler neg dvt    # Hypoxia, nocturnal  - cxr neg ; cta chest negative for PE  - likely due to atelectasis , deconditioning   - incentive spirometer  - increasing mobilization, PT OOB  - requires O2 at night per pulm    # thrombocyotpenia  - check blue top, LDH, PT/INR/PTT  - heme consult    # Sinus Tachycardia   - echo reviewed, PE ruled out, no fever  - stable     # HLD  - C/w Statin    # Herpes  - C/w Valtrex    DVT ppx: Lovenox held for thrombocytopenia    dw pt and wife    [facility]  [phone]

## 2023-07-19 NOTE — PROGRESS NOTE ADULT - SUBJECTIVE AND OBJECTIVE BOX
Date of Service: 07-19-23 @ 14:48    Patient is a 78y old  Male who presents with a chief complaint of Fever (19 Jul 2023 11:53)      Any change in ROS: he seems OK: no sob:     MEDICATIONS  (STANDING):  atovaquone  Suspension 1500 milliGRAM(s) Oral daily  chlorhexidine 2% Cloths 1 Application(s) Topical daily  diphenhydrAMINE 25 milliGRAM(s) Oral at bedtime  hydrOXYzine hydrochloride 25 milliGRAM(s) Oral four times a day  pantoprazole    Tablet 40 milliGRAM(s) Oral before breakfast  predniSONE   Tablet 15 milliGRAM(s) Oral daily  simvastatin 40 milliGRAM(s) Oral at bedtime  tamsulosin 0.8 milliGRAM(s) Oral at bedtime  valACYclovir 500 milliGRAM(s) Oral daily    MEDICATIONS  (PRN):  acetaminophen     Tablet .. 650 milliGRAM(s) Oral every 6 hours PRN Temp greater or equal to 38C (100.4F), Mild Pain (1 - 3)  aluminum hydroxide/magnesium hydroxide/simethicone Suspension 30 milliLiter(s) Oral every 4 hours PRN Dyspepsia  melatonin 5 milliGRAM(s) Oral at bedtime PRN Insomnia  ondansetron Injectable 4 milliGRAM(s) IV Push every 8 hours PRN Nausea and/or Vomiting    Vital Signs Last 24 Hrs  T(C): 36.4 (19 Jul 2023 10:14), Max: 36.9 (19 Jul 2023 00:10)  T(F): 97.5 (19 Jul 2023 10:14), Max: 98.5 (19 Jul 2023 04:51)  HR: 88 (19 Jul 2023 10:14) (85 - 108)  BP: 124/67 (19 Jul 2023 10:14) (106/67 - 164/70)  BP(mean): --  RR: 18 (19 Jul 2023 10:14) (18 - 18)  SpO2: 94% (19 Jul 2023 10:14) (92% - 96%)    Parameters below as of 19 Jul 2023 10:14  Patient On (Oxygen Delivery Method): room air        I&O's Summary    18 Jul 2023 07:01  -  19 Jul 2023 07:00  --------------------------------------------------------  IN: 480 mL / OUT: 400 mL / NET: 80 mL    19 Jul 2023 07:01  -  19 Jul 2023 14:48  --------------------------------------------------------  IN: 480 mL / OUT: 0 mL / NET: 480 mL          Physical Exam:   GENERAL: NAD, well-groomed, well-developed  HEENT: JONY/   Atraumatic, Normocephalic  ENMT: No tonsillar erythema, exudates, or enlargement; Moist mucous membranes, Good dentition, No lesions  NECK: Supple, No JVD, Normal thyroid  CHEST/LUNG: Clear to auscultaion   CVS: Regular rate and rhythm; No murmurs, rubs, or gallops  GI: : Soft, Nontender, Nondistended; Bowel sounds present  NERVOUS SYSTEM:  Alert & Oriented X3  EXTREMITIES:  2+ Peripheral Pulses, No clubbing, cyanosis, or edema  LYMPH: No lymphadenopathy noted  SKIN: No rashes or lesions  ENDOCRINOLOGY: No Thyromegaly  PSYCH: Appropriate    Labs:  30                            11.0   12.24 )-----------( 43       ( 19 Jul 2023 10:00 )             34.3                         11.2   11.80 )-----------( 96       ( 18 Jul 2023 06:19 )             34.4                         11.3   12.82 )-----------( 143      ( 17 Jul 2023 07:12 )             34.3                         12.1   12.43 )-----------( 205      ( 16 Jul 2023 05:54 )             37.0     07-19    140  |  104  |  17  ----------------------------<  182<H>  4.1   |  25  |  0.98  07-18    138  |  100  |  16  ----------------------------<  97  4.1   |  26  |  1.09  07-16    139  |  102  |  21  ----------------------------<  99  4.2   |  26  |  1.14    Ca    8.7      19 Jul 2023 10:00  Ca    9.0      18 Jul 2023 06:27    TPro  5.3<L>  /  Alb  2.7<L>  /  TBili  0.4  /  DBili  x   /  AST  24  /  ALT  33  /  AlkPhos  52  07-18    CAPILLARY BLOOD GLUCOSE          LIVER FUNCTIONS - ( 18 Jul 2023 06:27 )  Alb: 2.7 g/dL / Pro: 5.3 g/dL / ALK PHOS: 52 U/L / ALT: 33 U/L / AST: 24 U/L / GGT: x           PT/INR - ( 19 Jul 2023 12:23 )   PT: 12.4 sec;   INR: 1.07 ratio         PTT - ( 19 Jul 2023 12:23 )  PTT:27.2 sec  Urinalysis Basic - ( 19 Jul 2023 10:00 )    Color: x / Appearance: x / SG: x / pH: x  Gluc: 182 mg/dL / Ketone: x  / Bili: x / Urobili: x   Blood: x / Protein: x / Nitrite: x   Leuk Esterase: x / RBC: x / WBC x   Sq Epi: x / Non Sq Epi: x / Bacteria: x            RECENT CULTURES:  07-14 @ 10:00 .Blood Blood-Peripheral         rad< from: CT Angio Chest PE Protocol w/ IV Cont (07.17.23 @ 17:39) >  coronary artery calcifications.    AIRWAYS/LUNGS/PLEURA: Linear opacities in the bilateral lower lobes and   to a lesser extent the right middle lobe and lingula, suggestive of   atelectasis. A thin wall air cyst in the superior lingula. No pleural   effusion.    UPPER ABDOMEN: Unremarkable.    BONES/SOFT TISSUES: Degenerative changes.    IMPRESSION:  No pulmonary embolism.    --- End of Report ---           KARLA CODY MD; Resident Radiologist  This document has been electronically signed.  TOM ANDERSON MD; Attending Radiologist  This document has been electronically signed. Jul 18 2023  9:53AM    < end of copied text >         No growth at 4 days    07-14 @ 09:58 Clean Catch Clean Catch (Midstream)                No growth    07-14 @ 09:23 .Blood Blood-Peripheral                No growth at 4 days          RESPIRATORY CULTURES:          Studies  Chest X-RAY  CT SCAN Chest   Venous Dopplers: LE:   CT Abdomen  Others  ct< from: CT Angio Chest PE Protocol w/ IV Cont (07.17.23 @ 17:39) >    FINDINGS:    PULMONARY ANGIOGRAM: Contrast bolus is satisfactory. No main, right main,   left main, lobar or segmental pulmonary embolism. Limited evaluation of   subsegmental pulmonary arteries secondary tomixing artifact.    LYMPH NODES: Small axillary and mediastinal lymph nodes, for example a   subcarinal lymph node measures 1.4 cm.    < end of copied text >

## 2023-07-19 NOTE — PROGRESS NOTE ADULT - ASSESSMENT
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) and Diffuse Erythroderma (initial rash starting February 2023, worsened with May 2023) currently on prednisone taper with first dose Skyrizi ( RISANKIZUMAB-RZAA-  AN IL-23 inhibitor_  # Fever r/o Sepsis:  # Diffuse Erythroderma:  # HLD:  # Herpes:  # DVT ppx:    # Fever r/o Sepsis:-   -he has non covid coronavirus infection; that could  be th  reason for his fever:   -however his chest xray is mostly clar: which does not explain his hypoxia:   -he also has inability to take deep breaths;   -In addition he is hypoxic with clear chest radiograph and have been on high dose steroids without bactrim prophylaxis  -would do cta and see th elung parenchyma:   -send fungitell and sputum for pcp and LDH   -cont to use o2 to keep his sao2 above 90% all the time:   do dopplers as he has significant swelling of his legs  -dw ID   -his ct scan has no pe:  dopplers are negative for pe   and he has linear atelectasis on both sides:  today he is able to do  incentive spirometry upto 3000cc:  on room air : on ambulation yesterday he did not desaturate  -he does desaturate while sleeping : need to monitor his overnight oximetry  :  no pcp suggesting on ct chest  : fungitell pending:  his oxygenation has improved and did not need oxygen last night   # Diffuse Erythroderma:  -he has been on high dosages of steroids which are gradually being taperd:   # HLD:  -on simvastatin  # Herpes:  -on valtrex  # DVT ppx:  -off lovenox  Thrombocytopenia:   -off lovenox:   -plts dropped further today    - hem onc following:   -no bleeding:   - awaiting ct abd      dw id and ACP: and wife

## 2023-07-19 NOTE — PROGRESS NOTE ADULT - ASSESSMENT
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     SIRS/FEVER/ERYTHRODERMA  Presentation with pronounced scaling of skin very suggestive of erythrodermic psoriasis and unclear etiology of acute  leading to hospitalization and fever  very curious history of Primary Cutaneous B-cell Lymphoma   Infection in June 2023 at [facility] for seasonal coronavirus -- now again positive on this admission   June 2022 was SARS-CoV-2 here at [facility] ER  Leukocytosis -- likely reactive d/t steroid for diffuse erythroderma  Fever may be due to recent Skyrizi as this is a humanized MAB, seasonal coronavirus, pbut low suspicion for bacterial sepsis   Hypoxia - may be viral etiology, no PE; rule out PCP given patient has been on steroids for ~2 months without PCP ppx   - CTA chest with no PE, findings suggestive of atelectasias, no findings to suggest PCP   - LDH wnl; afebrile, WBC downtrending, denies having any cough or sputum production    Recommendations:  -follow up geetha, in process   -continue to observe off antibiotics   -Dermatology following - continuing on prednisone taper   -- continue atovaquone 1500mg PO daily for PCP ppx while on above     (reported he was told he may have a sulfa allergy after taking diuretic with sulfa in it)  - supplemental O2 as needed, wean as tolerated    -continue supportive care       Patrick Anderson M.D.  [facility], Division of Infectious Diseases  [phone]  After 5pm on weekdays and all day on weekends - please call [phone]

## 2023-07-20 ENCOUNTER — TRANSCRIPTION ENCOUNTER (OUTPATIENT)
Age: 78
End: 2023-07-20

## 2023-07-20 LAB
BASOPHILS # BLD AUTO: 0.06 K/UL — SIGNIFICANT CHANGE UP (ref 0–0.2)
BASOPHILS NFR BLD AUTO: 0.5 % — SIGNIFICANT CHANGE UP (ref 0–2)
EOSINOPHIL # BLD AUTO: 0.44 K/UL — SIGNIFICANT CHANGE UP (ref 0–0.5)
EOSINOPHIL NFR BLD AUTO: 3.6 % — SIGNIFICANT CHANGE UP (ref 0–6)
HCT VFR BLD CALC: 33.2 % — LOW (ref 39–50)
HGB BLD-MCNC: 10.9 G/DL — LOW (ref 13–17)
IMM GRANULOCYTES NFR BLD AUTO: 5.4 % — HIGH (ref 0–0.9)
LYMPHOCYTES # BLD AUTO: 17.8 % — SIGNIFICANT CHANGE UP (ref 13–44)
LYMPHOCYTES # BLD AUTO: 2.19 K/UL — SIGNIFICANT CHANGE UP (ref 1–3.3)
MCHC RBC-ENTMCNC: 30.7 PG — SIGNIFICANT CHANGE UP (ref 27–34)
MCHC RBC-ENTMCNC: 32.8 GM/DL — SIGNIFICANT CHANGE UP (ref 32–36)
MCV RBC AUTO: 93.5 FL — SIGNIFICANT CHANGE UP (ref 80–100)
MONOCYTES # BLD AUTO: 0.83 K/UL — SIGNIFICANT CHANGE UP (ref 0–0.9)
MONOCYTES NFR BLD AUTO: 6.8 % — SIGNIFICANT CHANGE UP (ref 2–14)
NEUTROPHILS # BLD AUTO: 8.11 K/UL — HIGH (ref 1.8–7.4)
NEUTROPHILS NFR BLD AUTO: 65.9 % — SIGNIFICANT CHANGE UP (ref 43–77)
NRBC # BLD: 0 /100 WBCS — SIGNIFICANT CHANGE UP (ref 0–0)
PLATELET # BLD AUTO: 27 K/UL — LOW (ref 150–400)
RBC # BLD: 3.55 M/UL — LOW (ref 4.2–5.8)
RBC # FLD: 12.2 % — SIGNIFICANT CHANGE UP (ref 10.3–14.5)
WBC # BLD: 12.29 K/UL — HIGH (ref 3.8–10.5)
WBC # FLD AUTO: 12.29 K/UL — HIGH (ref 3.8–10.5)

## 2023-07-20 PROCEDURE — 76705 ECHO EXAM OF ABDOMEN: CPT | Mod: 26

## 2023-07-20 RX ORDER — PREDNISONE 5 MG/1
5 TABLET ORAL
Qty: 21 | Refills: 1 | Status: ACTIVE | COMMUNITY
Start: 2023-07-19 | End: 1900-01-01

## 2023-07-20 RX ORDER — HYDROXYZINE HCL 10 MG
25 TABLET ORAL DAILY
Refills: 0 | Status: DISCONTINUED | OUTPATIENT
Start: 2023-07-20 | End: 2023-07-24

## 2023-07-20 RX ADMIN — TAMSULOSIN HYDROCHLORIDE 0.8 MILLIGRAM(S): 0.4 CAPSULE ORAL at 21:30

## 2023-07-20 RX ADMIN — SIMVASTATIN 40 MILLIGRAM(S): 20 TABLET, FILM COATED ORAL at 21:30

## 2023-07-20 RX ADMIN — PANTOPRAZOLE SODIUM 40 MILLIGRAM(S): 20 TABLET, DELAYED RELEASE ORAL at 05:45

## 2023-07-20 RX ADMIN — Medication 15 MILLIGRAM(S): at 05:45

## 2023-07-20 RX ADMIN — Medication 25 MILLIGRAM(S): at 20:50

## 2023-07-20 RX ADMIN — VALACYCLOVIR 500 MILLIGRAM(S): 500 TABLET, FILM COATED ORAL at 12:05

## 2023-07-20 RX ADMIN — Medication 25 MILLIGRAM(S): at 21:32

## 2023-07-20 RX ADMIN — ATOVAQUONE 1500 MILLIGRAM(S): 750 SUSPENSION ORAL at 12:06

## 2023-07-20 NOTE — DISCHARGE NOTE PROVIDER - NSDCCPCAREPLAN_GEN_ALL_CORE_FT
PRINCIPAL DISCHARGE DIAGNOSIS  Diagnosis: Sepsis  Assessment and Plan of Treatment: Blood cultures negative, urinalysis negative  Likely due to coronovirus  Supportive care  Follow-up with your Primary Care Doctor      SECONDARY DISCHARGE DIAGNOSES  Diagnosis: Thrombopenia  Assessment and Plan of Treatment: Likely due to Skyrizi  Monitor platelets  Follow-up with your Primary Care Doctor or Hematologist    Diagnosis: Erythroderma  Assessment and Plan of Treatment: Take prednisone as directed  Follow-up with Dermatology     PRINCIPAL DISCHARGE DIAGNOSIS  Diagnosis: Sepsis  Assessment and Plan of Treatment: Blood cultures negative, urinalysis negative  Likely due to coronovirus  Supportive care  Follow-up with your Primary Care Doctor      SECONDARY DISCHARGE DIAGNOSES  Diagnosis: Thrombopenia  Assessment and Plan of Treatment: Likely due to Skyrizi  Monitor platelets  Follow-up with your Primary Care Doctor in 1-2 weeks and Hematologist    Diagnosis: Erythroderma  Assessment and Plan of Treatment: Take prednisone as directed  Follow-up with Dermatology     PRINCIPAL DISCHARGE DIAGNOSIS  Diagnosis: Sepsis  Assessment and Plan of Treatment: Blood cultures negative, urinalysis negative  Likely due to coronovirus  Supportive care  Follow-up with your Primary Care Doctor in 1-2 weeks.      SECONDARY DISCHARGE DIAGNOSES  Diagnosis: Thrombopenia  Assessment and Plan of Treatment: Likely due to Skyrizi  Monitor platelets  Follow-up with your Primary Care Doctor in 1-2 weeks and hematologist Dr. Harris for blood counts in 2-3 weeks.    Diagnosis: Erythroderma  Assessment and Plan of Treatment: Take prednisone as directed  Follow-up with Dermatology Dr. Christianson as needed

## 2023-07-20 NOTE — PROGRESS NOTE ADULT - SUBJECTIVE AND OBJECTIVE BOX
Date of Service: 07-20-23 @ 13:10    Patient is a 78y old  Male who presents with a chief complaint of Fever (20 Jul 2023 11:59)      Any change in ROS: seems OK: no sob:  no cough:       MEDICATIONS  (STANDING):  atovaquone  Suspension 1500 milliGRAM(s) Oral daily  chlorhexidine 2% Cloths 1 Application(s) Topical daily  diphenhydrAMINE 25 milliGRAM(s) Oral at bedtime  hydrOXYzine hydrochloride 25 milliGRAM(s) Oral four times a day  pantoprazole    Tablet 40 milliGRAM(s) Oral before breakfast  predniSONE   Tablet 15 milliGRAM(s) Oral daily  simvastatin 40 milliGRAM(s) Oral at bedtime  tamsulosin 0.8 milliGRAM(s) Oral at bedtime  valACYclovir 500 milliGRAM(s) Oral daily    MEDICATIONS  (PRN):  acetaminophen     Tablet .. 650 milliGRAM(s) Oral every 6 hours PRN Temp greater or equal to 38C (100.4F), Mild Pain (1 - 3)  aluminum hydroxide/magnesium hydroxide/simethicone Suspension 30 milliLiter(s) Oral every 4 hours PRN Dyspepsia  melatonin 5 milliGRAM(s) Oral at bedtime PRN Insomnia  ondansetron Injectable 4 milliGRAM(s) IV Push every 8 hours PRN Nausea and/or Vomiting    Vital Signs Last 24 Hrs  T(C): 36.8 (20 Jul 2023 10:51), Max: 36.8 (20 Jul 2023 03:53)  T(F): 98.2 (20 Jul 2023 10:51), Max: 98.2 (20 Jul 2023 03:53)  HR: 99 (20 Jul 2023 03:53) (99 - 103)  BP: 145/73 (20 Jul 2023 03:53) (139/74 - 145/73)  BP(mean): --  RR: 18 (20 Jul 2023 03:53) (18 - 18)  SpO2: 95% (20 Jul 2023 03:53) (95% - 95%)    Parameters below as of 20 Jul 2023 03:53  Patient On (Oxygen Delivery Method): room air        I&O's Summary    19 Jul 2023 07:01  -  20 Jul 2023 07:00  --------------------------------------------------------  IN: 480 mL / OUT: 0 mL / NET: 480 mL          Physical Exam:   GENERAL: NAD, well-groomed, well-developed  HEENT: JONY/   Atraumatic, Normocephalic  ENMT: No tonsillar erythema, exudates, or enlargement; Moist mucous membranes, Good dentition, No lesions  NECK: Supple, No JVD, Normal thyroid  CHEST/LUNG: Clear to auscultaion  CVS: Regular rate and rhythm; No murmurs, rubs, or gallops  GI: : Soft, Nontender, Nondistended; Bowel sounds present  NERVOUS SYSTEM:  Alert & Oriented X3  EXTREMITIES:  2+ Peripheral Pulses, No clubbing, cyanosis, or edema  LYMPH: No lymphadenopathy noted  SKIN: erytheroderma  ENDOCRINOLOGY: No Thyromegaly  PSYCH: Appropriate    Labs:  30                            10.9   12.29 )-----------( 27       ( 20 Jul 2023 05:51 )             33.2                         11.0   12.24 )-----------( 43       ( 19 Jul 2023 10:00 )             34.3                         11.2   11.80 )-----------( 96       ( 18 Jul 2023 06:19 )             34.4                         11.3   12.82 )-----------( 143      ( 17 Jul 2023 07:12 )             34.3     07-19    140  |  104  |  17  ----------------------------<  182<H>  4.1   |  25  |  0.98  07-18    138  |  100  |  16  ----------------------------<  97  4.1   |  26  |  1.09    Ca    8.7      19 Jul 2023 10:00    TPro  5.3<L>  /  Alb  2.7<L>  /  TBili  0.4  /  DBili  x   /  AST  24  /  ALT  33  /  AlkPhos  52  07-18    CAPILLARY BLOOD GLUCOSE            PT/INR - ( 19 Jul 2023 12:23 )   PT: 12.4 sec;   INR: 1.07 ratio         PTT - ( 19 Jul 2023 12:23 )  PTT:27.2 sec  Urinalysis Basic - ( 19 Jul 2023 10:00 )    Color: x / Appearance: x / SG: x / pH: x  Gluc: 182 mg/dL / Ketone: x  / Bili: x / Urobili: x   Blood: x / Protein: x / Nitrite: x   Leuk Esterase: x / RBC: x / WBC x   Sq Epi: x / Non Sq Epi: x / Bacteria: x            RECENT CULTURES:  07-14 @ 10:00 .Blood Blood-Peripheral         rad< from: CT Angio Chest PE Protocol w/ IV Cont (07.17.23 @ 17:39) >    LYMPH NODES: Small axillary and mediastinal lymph nodes, for example a   subcarinal lymph node measures 1.4 cm.    HEART/VASCULATURE: The heart is normal in size. No pericardial effusion.   The aorta is normal in caliber.  There are aortic, aortic valve, and   coronary artery calcifications.    AIRWAYS/LUNGS/PLEURA: Linear opacities in the bilateral lower lobes and   to a lesser extent the right middle lobe and lingula, suggestive of   atelectasis. A thin wall air cyst in the superior lingula. No pleural   effusion.    UPPER ABDOMEN: Unremarkable.    BONES/SOFT TISSUES: Degenerative changes.    IMPRESSION:  No pulmonary embolism.    --- End of Report ---           KARLA CODY MD; Resident Radiologist  This document has been electronically signed.  TOM ANDERSON MD; Attending Radiologist    < end of copied text >         No growth at 5 days    07-14 @ 09:58 Clean Catch Clean Catch (Midstream)                No growth    07-14 @ 09:23 .Blood Blood-Peripheral                No growth at 5 days          RESPIRATORY CULTURES:          Studies  Chest X-RAY  CT SCAN Chest   Venous Dopplers: LE:   CT Abdomen  Others        ct

## 2023-07-20 NOTE — PHARMACOTHERAPY INTERVENTION NOTE - COMMENTS
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 Presents to [facility] for worsening fever.    Patient presents with thrombocytopenia, platelet trending down, plt = 27 (7/20). Pt pending HITworking - so far heparin-PF4 (-), JUMANA pending. Pt is also on pantoprazole for GI protection 2/2 steroids; was on omeprazole at home.   Several case reports have shown an association between PPIs, particularly pantoprazole, and thrombocytopenias. Risk of thrombocytopenias according to pantoprazole PI/ Lexicomp is =2%. However, spoke to Dr. Mcleod, recommended to discontinue pantoprazole at this time due to the thrombocytopenia and see if this may help.     https://pubmed.ncbi.nlm.nih.gov/30267405/     Alexandra LawlerD  PGY1 Pharmacy Resident  Available on Teams & [id number]

## 2023-07-20 NOTE — DISCHARGE NOTE PROVIDER - CARE PROVIDER_API CALL
Katelin Christianson  Dermatology  [address], [address]  [place] [address]  Phone: [phone]  Fax: [phone]  Follow Up Time: 1 week    Le Anthony  Infectious Disease  [address], [address]  [place] [address]  Phone: [phone]  Fax: [phone]  Follow Up Time: 1 week    Jordon Zabala  Internal Medicine  [address], [address]  [place] [address]  Phone: [phone]  Fax: [phone]  Follow Up Time: 1 week   Katelin Christianson  Dermatology  [address], [address]  [place] [address]  Phone: [phone]  Fax: [phone]  Follow Up Time: 1 week    Le Anthony  Infectious Disease  [address], [address]  [place] [address]  Phone: [phone]  Fax: [phone]  Follow Up Time: 1 week    Jordon Zabala  Internal Medicine  [address], [address]  [place] [address]  Phone: [phone]  Fax: [phone]  Follow Up Time: 1 week    Virgil Harris  Hematology  [address] [place] [address]  Phone: [phone]  Fax: [phone]  Follow Up Time: 2 weeks

## 2023-07-20 NOTE — DISCHARGE NOTE PROVIDER - PROVIDER TOKENS
PROVIDER:[TOKEN:[[id number]],FOLLOWUP:[1 week]],PROVIDER:[TOKEN:[[id number]],FOLLOWUP:[1 week]],PROVIDER:[TOKEN:[[id number]],FOLLOWUP:[1 week]] PROVIDER:[TOKEN:[[id number]],FOLLOWUP:[1 week]],PROVIDER:[TOKEN:[[id number]],FOLLOWUP:[1 week]],PROVIDER:[TOKEN:[[id number]],FOLLOWUP:[1 week]],PROVIDER:[TOKEN:[[id number]],FOLLOWUP:[2 weeks]]

## 2023-07-20 NOTE — PROGRESS NOTE ADULT - SUBJECTIVE AND OBJECTIVE BOX
Patient is a 78y old  Male who presents with a chief complaint of Fever (19 Jul 2023 15:31)      SUBJECTIVE / OVERNIGHT EVENTS:    Patient seen and examined. no complaints wants to go home. no bleeding.      Vital Signs Last 24 Hrs  T(C): 36.8 (20 Jul 2023 10:51), Max: 36.8 (20 Jul 2023 03:53)  T(F): 98.2 (20 Jul 2023 10:51), Max: 98.2 (20 Jul 2023 03:53)  HR: 99 (20 Jul 2023 03:53) (99 - 103)  BP: 145/73 (20 Jul 2023 03:53) (139/74 - 145/73)  BP(mean): --  RR: 18 (20 Jul 2023 03:53) (18 - 18)  SpO2: 95% (20 Jul 2023 03:53) (95% - 95%)    Parameters below as of 20 Jul 2023 03:53  Patient On (Oxygen Delivery Method): room air      I&O's Summary    19 Jul 2023 07:01  -  20 Jul 2023 07:00  --------------------------------------------------------  IN: 480 mL / OUT: 0 mL / NET: 480 mL        PE:  GENERAL: NAD, AAOx3  CHEST/LUNG: CTABL, No wheeze  HEART: Regular rate and rhythm; + murmur  ABDOMEN: Soft, Nontender, Nondistended; Bowel sounds present  EXTREMITIES:  2+ Peripheral Pulses, +2 le edema  SKIN: erythematous over body  NEURO: No focal deficits    LABS:                        10.9   12.29 )-----------( 27       ( 20 Jul 2023 05:51 )             33.2     07-19    140  |  104  |  17  ----------------------------<  182<H>  4.1   |  25  |  0.98    Ca    8.7      19 Jul 2023 10:00      PT/INR - ( 19 Jul 2023 12:23 )   PT: 12.4 sec;   INR: 1.07 ratio         PTT - ( 19 Jul 2023 12:23 )  PTT:27.2 sec  CAPILLARY BLOOD GLUCOSE            Urinalysis Basic - ( 19 Jul 2023 10:00 )    Color: x / Appearance: x / SG: x / pH: x  Gluc: 182 mg/dL / Ketone: x  / Bili: x / Urobili: x   Blood: x / Protein: x / Nitrite: x   Leuk Esterase: x / RBC: x / WBC x   Sq Epi: x / Non Sq Epi: x / Bacteria: x        RADIOLOGY & ADDITIONAL TESTS:    Imaging Personally Reviewed:  [x] YES  [ ] NO    Consultant(s) Notes Reviewed:  [x] YES  [ ] NO    MEDICATIONS  (STANDING):  atovaquone  Suspension 1500 milliGRAM(s) Oral daily  chlorhexidine 2% Cloths 1 Application(s) Topical daily  diphenhydrAMINE 25 milliGRAM(s) Oral at bedtime  hydrOXYzine hydrochloride 25 milliGRAM(s) Oral four times a day  pantoprazole    Tablet 40 milliGRAM(s) Oral before breakfast  predniSONE   Tablet 15 milliGRAM(s) Oral daily  simvastatin 40 milliGRAM(s) Oral at bedtime  tamsulosin 0.8 milliGRAM(s) Oral at bedtime  valACYclovir 500 milliGRAM(s) Oral daily    MEDICATIONS  (PRN):  acetaminophen     Tablet .. 650 milliGRAM(s) Oral every 6 hours PRN Temp greater or equal to 38C (100.4F), Mild Pain (1 - 3)  aluminum hydroxide/magnesium hydroxide/simethicone Suspension 30 milliLiter(s) Oral every 4 hours PRN Dyspepsia  melatonin 5 milliGRAM(s) Oral at bedtime PRN Insomnia  ondansetron Injectable 4 milliGRAM(s) IV Push every 8 hours PRN Nausea and/or Vomiting      Care Discussed with Consultants/Other Providers [x] YES  [ ] NO    HEALTH ISSUES - PROBLEM Dx:  Hearing loss

## 2023-07-20 NOTE — PROGRESS NOTE ADULT - ASSESSMENT
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 Presents to [facility] for worsening fever.    # Fever   - no evidence of acute pulmonary disease  - RVP +seasonal coronavirus (not COVID)  - monitor off abx per ID  - BCx/UCx NGTD  - dw ID re elevated fungitell lvl, CT chest no signs of PCP, cont atovaquone for pcp ppx and outpt fu Dr Carmita Anthony ID outpt next week in office    # Diffuse Erythroderma  # LE edema  - C/w Prednisone taper per derm, outpt fu  - le doppler neg dvt    # Hypoxia, nocturnal  - cxr neg ; cta chest negative for PE, likely due to atelectasis , deconditioning   - incentive spirometer, increasing mobilization, PT OOB  - requires O2 at night per pulm, CM for home O2    # thrombocyotpenia  - appreciate heme recs, likely 2/2 skyrizi  - downtrending but no signs of blding  - pt wants to go home, discussed close fu CBC, possible outpt fu CBC, defer to heme  - pt refusing CT A/P and spleen US    # Sinus Tachycardia   - echo reviewed, PE ruled out, no fever  - stable     # HLD  - C/w Statin    # Herpes  - C/w Valtrex    DVT ppx: Lovenox held for thrombocytopenia    dw pt and wife    dispo fu heme recs    [facility]

## 2023-07-20 NOTE — DISCHARGE NOTE PROVIDER - NSDCFUADDAPPT_GEN_ALL_CORE_FT
APPTS ARE READY TO BE MADE: [X] YES    Best Family or Patient Contact (if needed):    Additional Information about above appointments (if needed):    1:   2:   3:     Other comments or requests:    APPTS ARE READY TO BE MADE: [X] YES    Best Family or Patient Contact (if needed):    Additional Information about above appointments (if needed):    1:   2:   3:     Other comments or requests:   Patient was previously scheduled with Dr. Katelin Christianson on 7/25/23 at 3:30pm at [address] [place] [address]   Patient/Caregiver declined scheduling assistance for Dr. Anthony and Dr. Zabala as patient feels it would be unnecessary.   Provider Dr. Virgil Harris's office was contacted to secure an appointment, however the office will follow up with the patient/caregiver directly.

## 2023-07-20 NOTE — PROGRESS NOTE ADULT - ASSESSMENT
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) and Diffuse Erythroderma (initial rash starting February 2023, worsened with May 2023) currently on prednisone taper with first dose Skyrizi ( RISANKIZUMAB-RZAA-  AN IL-23 inhibitor_  # Fever r/o Sepsis:  # Diffuse Erythroderma:  # HLD:  # Herpes:  # DVT ppx:    # Fever r/o Sepsis:-   -he has non covid coronavirus infection; that could  be th  reason for his fever:   -however his chest xray is mostly clar: which does not explain his hypoxia:   -he also has inability to take deep breaths;   -In addition he is hypoxic with clear chest radiograph and have been on high dose steroids without bactrim prophylaxis  -would do cta and see th elung parenchyma:   -send fungitell and sputum for pcp and LDH   -cont to use o2 to keep his sao2 above 90% all the time:   do dopplers as he has significant swelling of his legs  -dw ID   -his ct scan has no pe:  dopplers are negative for pe   and he has linear atelectasis on both sides:  today he is able to do  incentive spirometry upto 3000cc:  on room air : on ambulation yesterday he did not desaturate  -he does desaturate while sleeping : need to monitor his overnight oximetry  :  no pcp suggesting on ct chest  : fungitell slightly high : but is till Dont think it is of very important nature in this clinical condition:  however ID is floowing any way    his oxygenation has improved and did not need oxygen last night   # Diffuse Erythroderma:  -he has been on high dosages of steroids which are gradually being taperd:   # HLD:  -on simvastatin  # Herpes:  -on valtrex  # DVT ppx:  -off lovenox  Thrombocytopenia:   -off lovenox:   -plts dropped further today    - hem onc following:   -no bleeding:   - awaiting ct abd  : pt is refusing it:     dw id and ACP: and wife

## 2023-07-20 NOTE — CHART NOTE - NSCHARTNOTEFT_GEN_A_CORE
Pt and pt's wife refused CT A/P and requested order to be canceled. Dr. Mcleod aware.    SIMONE Carlosc

## 2023-07-20 NOTE — PROGRESS NOTE ADULT - SUBJECTIVE AND OBJECTIVE BOX
[facility] DIVISION OF INFECTIOUS DISEASES  RACHELLE Manzanares Y. Patel, S. Shah, G. Shawn  [phone]  ([phone] - weekdays after 5pm and weekends)    Name: ZAHRA LONG  Age/Gender: 78y Male  MRN: [id number]    Interval History:  Patient seen and examined this morning.   Feels better, no new complaints  Notes reviewed.   No concerning overnight events.  Afebrile.   Allergies: No Known Allergies      Objective:  Vitals:   T(F): 98.2 (07-20-23 @ 10:51), Max: 98.2 (07-20-23 @ 03:53)  HR: 99 (07-20-23 @ 03:53) (99 - 103)  BP: 145/73 (07-20-23 @ 03:53) (139/74 - 145/73)  RR: 18 (07-20-23 @ 03:53) (18 - 18)  SpO2: 95% (07-20-23 @ 03:53) (95% - 95%)  Physical Examination:  General: no acute distress, on RA  HEENT: NC/AT, anicteric, neck supple  Respiratory: clear to auscultation bilaterally  Cardiovascular: S1 and S2 present, normal rate  Gastrointestinal: soft, nontender, nondistended  Extremities: b/l LE edema, no cyanosis  Skin: generalized erythematous rash with dry scaling skin  Psych: appropriate affect and mood for situation  Lines: PIV    Laboratory Studies:  CBC:                       10.9   12.29 )-----------( 27       ( 20 Jul 2023 05:51 )             33.2     WBC Trend:  12.29 07-20-23 @ 05:51  12.24 07-19-23 @ 10:00  11.80 07-18-23 @ 06:19  12.82 07-17-23 @ 07:12  12.43 07-16-23 @ 05:54  13.10 07-15-23 @ 06:02  13.23 07-14-23 @ 09:47    CMP: 07-19    140  |  104  |  17  ----------------------------<  182<H>  4.1   |  25  |  0.98    Ca    8.7      19 Jul 2023 10:00      Creatinine: 0.98 mg/dL (07-19-23 @ 10:00)  Creatinine: 1.09 mg/dL (07-18-23 @ 06:27)  Creatinine: 1.14 mg/dL (07-16-23 @ 05:53)  Creatinine: 1.11 mg/dL (07-15-23 @ 06:01)  Creatinine: 1.05 mg/dL (07-14-23 @ 09:47)    Microbiology: reviewed   Culture - Blood (collected 07-14-23 @ 10:00)  Source: .Blood Blood-Peripheral  Final Report (07-19-23 @ 15:07):    No growth at 5 days    Culture - Urine (collected 07-14-23 @ 09:58)  Source: Clean Catch Clean Catch (Midstream)  Final Report (07-15-23 @ 15:28):    No growth    Culture - Blood (collected 07-14-23 @ 09:23)  Source: .Blood Blood-Peripheral  Final Report (07-19-23 @ 15:07):    No growth at 5 days    Radiology: reviewed     Medications:  acetaminophen     Tablet .. 650 milliGRAM(s) Oral every 6 hours PRN  aluminum hydroxide/magnesium hydroxide/simethicone Suspension 30 milliLiter(s) Oral every 4 hours PRN  atovaquone  Suspension 1500 milliGRAM(s) Oral daily  chlorhexidine 2% Cloths 1 Application(s) Topical daily  diphenhydrAMINE 25 milliGRAM(s) Oral at bedtime  hydrOXYzine hydrochloride 25 milliGRAM(s) Oral four times a day  melatonin 5 milliGRAM(s) Oral at bedtime PRN  ondansetron Injectable 4 milliGRAM(s) IV Push every 8 hours PRN  predniSONE   Tablet 15 milliGRAM(s) Oral daily  simvastatin 40 milliGRAM(s) Oral at bedtime  tamsulosin 0.8 milliGRAM(s) Oral at bedtime  valACYclovir 500 milliGRAM(s) Oral daily    Current Antimicrobials:  atovaquone  Suspension 1500 milliGRAM(s) Oral daily  valACYclovir 500 milliGRAM(s) Oral daily    Prior/Completed Antimicrobials:  cefepime   IVPB  vancomycin  IVPB.

## 2023-07-20 NOTE — PROGRESS NOTE ADULT - ASSESSMENT
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     SIRS/FEVER/ERYTHRODERMA  Presentation with pronounced scaling of skin very suggestive of erythrodermic psoriasis and unclear etiology of acute  leading to hospitalization and fever  very curious history of Primary Cutaneous B-cell Lymphoma -refusing CT imaging for work up here  Infection in June 2023 at [facility] RVP+ for seasonal coronavirus -- now again positive on this admission   June 2022 was SARS-CoV-2 here at [facility] ER  Leukocytosis -- likely reactive d/t steroid for diffuse erythroderma  Fever may be due to recent Skyrizi as this is a humanized MAB, seasonal coronavirus, but low suspicion for bacterial sepsis   Hypoxia - may be viral etiology, no PE; patient has been on steroids for ~2 months without PCP ppx   - LDH wnl; afebrile, WBC downtrended/stable, denies having any cough    - Fungitell slightly elevated but CT imaging with no findings c/w PCP and clinically has improved without targeted treatment, low suspicion for PCP pneumonia at this time    Recommendations:  -continue to observe off antibiotics   -Dermatology following - continuing on prednisone taper   -- continue atovaquone 1500mg PO daily for PCP ppx while on above     (reported he was told he may have a sulfa allergy after taking diuretic with sulfa in it)  -continue supportive care   -discussed above with patient in detail, he will follow up in our ID office with Dr. Le Anthony next week    D/w Dr. Harsha Anderson M.D.  [facility], Division of Infectious Diseases  [phone]  After 5pm on weekdays and all day on weekends - please call [phone]

## 2023-07-20 NOTE — DISCHARGE NOTE PROVIDER - NSDCFUSCHEDAPPT_GEN_ALL_CORE_FT
[facility]  [address]  Scheduled Appointment: 08/09/2023     [facility]  [place] [address]  Scheduled Appointment: 07/25/2023    [facility]elin Christianson [facility]  [place] [address]  Scheduled Appointment: 08/09/2023

## 2023-07-20 NOTE — DISCHARGE NOTE PROVIDER - HOSPITAL COURSE
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 Presents to [facility] for worsening fever.    # Fever   - no evidence of acute pulmonary disease  - RVP +seasonal coronavirus (not COVID)  - monitor off abx per ID  - BCx/UCx NGTD  - dw ID re elevated fungitell lvl, CT chest no signs of PCP, cont atovaquone for pcp ppx and outpt fu Dr Carmita Anthony ID outpt next week in office    # Diffuse Erythroderma  # LE edema  - C/w Prednisone taper per derm, outpt fu  - le doppler neg dvt    # Hypoxia, nocturnal  - cxr neg ; cta chest negative for PE, likely due to atelectasis , deconditioning   - incentive spirometer, increasing mobilization, PT OOB  - requires O2 at night per pulm, CM for home O2    # thrombocytopenia  - appreciate heme recs, likely 2/2 skyrizi  - downtrending but no signs of blding  - pt wants to go home, discussed close fu CBC, possible outpt fu CBC, defer to heme**********************  - pt refusing CT A/P and spleen US    # Sinus Tachycardia   - echo reviewed, PE ruled out, no fever  - stable     # HLD  - C/w Statin    # Herpes  - C/w Valtrex    DVT ppx: Lovenox held for thrombocytopenia     78 year old male with PMHx Unspecified Autoimmune Skin Condition (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 Presents to [facility] for worsening fever.    # thrombocyotpenia  - 2/2 skyrizi vs atovaquone vs drug mediated?  - pt refusing CT A/P, spleen US no splenomegaly  - hold atovaquone and bactrim  - repeat blue top 23 on 7/24, heme cleared pt to dc   - stable    # Diffuse Erythroderma  # LE edema  - C/w Prednisone 10 mg po x 5 days, then 5 mg po x 5 days, derm outpt fu  - le doppler neg dvt  - lasix 20mg PRN  - cannot wear compression stockings 2/2 erythroderma and irritation    # Fever, resolved  # Hypoxia, nocurnal, resolved  # Sinus Tachycardia, resolved  - cxr neg ; cta chest negative for PE, likely due to atelectasis, deconditioning   - incentive spirometer, increasing mobilization, PT OOB  - RVP + coronavirus (not COVID)  - BCx/UCx NGTD  - dw ID re elevated fungitell lvl, CT chest no signs of PCP, started on atovaquone for pcp ppx, now with thrombocytopenia so stopped  - outpt fu Dr Le Anthony ID in office     # HLD  - C/w Statin    # Herpes  - C/w Valtrex    Medically cleared to be dc'ed home with outpt PT per attending Dr. Mcleod 78 year old male with PMHx Unspecified Autoimmune Skin Condition (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 Presents to [facility] for worsening fever.    # thrombocyotpenia  - 2/2 skyrizi vs atovaquone vs drug mediated?  - pt refusing CT A/P, spleen US no splenomegaly  - hold atovaquone and bactrim  - repeat blue top 23 on 7/24, heme cleared pt to dc   - stable    # Diffuse Erythroderma  # LE edema  - C/w Prednisone 10 mg po x 5 days, then 5 mg po x 5 days, derm outpt fu  - le doppler neg dvt  - lasix 20mg PRN  - cannot wear compression stockings 2/2 erythroderma and irritation    # Fever, resolved  # Hypoxia, nocurnal, resolved  # Sinus Tachycardia, resolved  - cxr neg ; cta chest negative for PE, likely due to atelectasis, deconditioning   - incentive spirometer, increasing mobilization, PT OOB  - RVP + coronavirus (not COVID)  - BCx/UCx NGTD  - dw ID re elevated fungitell lvl, CT chest no signs of PCP, started on atovaquone for pcp ppx, now with thrombocytopenia so stopped  - outpt fu Dr Le Anthony ID in office     # HLD  - C/w Statin    # Herpes  - C/w Valtrex    Medically cleared to be dc'ed home with outpt PT per attending Dr. Mcleod on 7/24/23.

## 2023-07-20 NOTE — DISCHARGE NOTE PROVIDER - NSDCMRMEDTOKEN_GEN_ALL_CORE_FT
alfuzosin 10 mg oral tablet, extended release: 1 tablet, extended release orally once a day  atovaquone 750 mg/5 mL oral suspension: 10 milliliter(s) orally once a day  furosemide 20 mg oral tablet: 1 tablet orally once a day  omeprazole 40 mg oral delayed release capsule: 1 delayed release capsule orally once a day  Physical Therapy: Evaluate and Treat  potassium chloride 10 mEq oral capsule, extended release: 1 capsule, extended release orally once a day  simvastatin 40 mg oral tablet: 1 tablet orally once a day  valACYclovir 500 mg oral tablet: 1 tablet orally once a day   alfuzosin 10 mg oral tablet, extended release: 1 tablet, extended release orally once a day  atovaquone 750 mg/5 mL oral suspension: 10 milliliter(s) orally once a day  omeprazole 40 mg oral delayed release capsule: 1 delayed release capsule orally once a day  predniSONE 10 mg oral tablet: 1 tab(s) orally once a day  predniSONE 5 mg oral tablet: 1 tab(s) orally once a day  simvastatin 40 mg oral tablet: 1 tablet orally once a day  valACYclovir 500 mg oral tablet: 1 tablet orally once a day   alfuzosin 10 mg oral tablet, extended release: 1 tablet, extended release orally once a day  omeprazole 40 mg oral delayed release capsule: 1 delayed release capsule orally once a day  predniSONE 10 mg oral tablet: 1 tab(s) orally once a day  predniSONE 5 mg oral tablet: 1 tab(s) orally once a day  simvastatin 40 mg oral tablet: 1 tablet orally once a day  valACYclovir 500 mg oral tablet: 1 tablet orally once a day

## 2023-07-21 PROBLEM — L53.9 ERYTHEMATOUS CONDITION, UNSPECIFIED: Chronic | Status: ACTIVE | Noted: 2023-07-14

## 2023-07-21 PROBLEM — C84.A0 CUTANEOUS T-CELL LYMPHOMA, UNSPECIFIED, UNSPECIFIED SITE: Chronic | Status: ACTIVE | Noted: 2023-07-14

## 2023-07-21 LAB
ALBUMIN SERPL ELPH-MCNC: 3.3 G/DL — SIGNIFICANT CHANGE UP (ref 3.3–5)
ALP SERPL-CCNC: 73 U/L — SIGNIFICANT CHANGE UP (ref 40–120)
ALT FLD-CCNC: 37 U/L — SIGNIFICANT CHANGE UP (ref 10–45)
ANION GAP SERPL CALC-SCNC: 13 MMOL/L — SIGNIFICANT CHANGE UP (ref 5–17)
ANION GAP SERPL CALC-SCNC: 9 MMOL/L — SIGNIFICANT CHANGE UP (ref 5–17)
AST SERPL-CCNC: 19 U/L — SIGNIFICANT CHANGE UP (ref 10–40)
BASOPHILS # BLD AUTO: 0.04 K/UL — SIGNIFICANT CHANGE UP (ref 0–0.2)
BASOPHILS # BLD AUTO: 0.33 K/UL — HIGH (ref 0–0.2)
BASOPHILS NFR BLD AUTO: 0.3 % — SIGNIFICANT CHANGE UP (ref 0–2)
BASOPHILS NFR BLD AUTO: 2.6 % — HIGH (ref 0–2)
BILIRUB SERPL-MCNC: 0.2 MG/DL — SIGNIFICANT CHANGE UP (ref 0.2–1.2)
BLD GP AB SCN SERPL QL: NEGATIVE — SIGNIFICANT CHANGE UP
BUN SERPL-MCNC: 18 MG/DL — SIGNIFICANT CHANGE UP (ref 7–23)
BUN SERPL-MCNC: 23 MG/DL — SIGNIFICANT CHANGE UP (ref 7–23)
CALCIUM SERPL-MCNC: 9 MG/DL — SIGNIFICANT CHANGE UP (ref 8.4–10.5)
CALCIUM SERPL-MCNC: 9.1 MG/DL — SIGNIFICANT CHANGE UP (ref 8.4–10.5)
CHLORIDE SERPL-SCNC: 103 MMOL/L — SIGNIFICANT CHANGE UP (ref 96–108)
CHLORIDE SERPL-SCNC: 106 MMOL/L — SIGNIFICANT CHANGE UP (ref 96–108)
CLOSURE TME COLL+EPINEP BLD: 17 K/UL — CRITICAL LOW (ref 150–400)
CO2 SERPL-SCNC: 24 MMOL/L — SIGNIFICANT CHANGE UP (ref 22–31)
CO2 SERPL-SCNC: 26 MMOL/L — SIGNIFICANT CHANGE UP (ref 22–31)
CREAT SERPL-MCNC: 1.06 MG/DL — SIGNIFICANT CHANGE UP (ref 0.5–1.3)
CREAT SERPL-MCNC: 1.13 MG/DL — SIGNIFICANT CHANGE UP (ref 0.5–1.3)
D DIMER BLD IA.RAPID-MCNC: 463 NG/ML DDU — HIGH
DAT POLY-SP REAG RBC QL: NEGATIVE — SIGNIFICANT CHANGE UP
EGFR: 67 ML/MIN/1.73M2 — SIGNIFICANT CHANGE UP
EGFR: 72 ML/MIN/1.73M2 — SIGNIFICANT CHANGE UP
EOSINOPHIL # BLD AUTO: 0.31 K/UL — SIGNIFICANT CHANGE UP (ref 0–0.5)
EOSINOPHIL # BLD AUTO: 0.68 K/UL — HIGH (ref 0–0.5)
EOSINOPHIL NFR BLD AUTO: 2.2 % — SIGNIFICANT CHANGE UP (ref 0–6)
EOSINOPHIL NFR BLD AUTO: 5.3 % — SIGNIFICANT CHANGE UP (ref 0–6)
GLUCOSE SERPL-MCNC: 154 MG/DL — HIGH (ref 70–99)
GLUCOSE SERPL-MCNC: 93 MG/DL — SIGNIFICANT CHANGE UP (ref 70–99)
HCT VFR BLD CALC: 34.4 % — LOW (ref 39–50)
HCT VFR BLD CALC: 39.4 % — SIGNIFICANT CHANGE UP (ref 39–50)
HGB BLD-MCNC: 11.1 G/DL — LOW (ref 13–17)
HGB BLD-MCNC: 12.9 G/DL — LOW (ref 13–17)
IMM GRANULOCYTES NFR BLD AUTO: 8.4 % — HIGH (ref 0–0.9)
LDH SERPL L TO P-CCNC: 236 U/L — SIGNIFICANT CHANGE UP (ref 50–242)
LYMPHOCYTES # BLD AUTO: 1.91 K/UL — SIGNIFICANT CHANGE UP (ref 1–3.3)
LYMPHOCYTES # BLD AUTO: 14.9 % — SIGNIFICANT CHANGE UP (ref 13–44)
LYMPHOCYTES # BLD AUTO: 16.4 % — SIGNIFICANT CHANGE UP (ref 13–44)
LYMPHOCYTES # BLD AUTO: 2.29 K/UL — SIGNIFICANT CHANGE UP (ref 1–3.3)
MCHC RBC-ENTMCNC: 30.5 PG — SIGNIFICANT CHANGE UP (ref 27–34)
MCHC RBC-ENTMCNC: 30.7 PG — SIGNIFICANT CHANGE UP (ref 27–34)
MCHC RBC-ENTMCNC: 32.3 GM/DL — SIGNIFICANT CHANGE UP (ref 32–36)
MCHC RBC-ENTMCNC: 32.7 GM/DL — SIGNIFICANT CHANGE UP (ref 32–36)
MCV RBC AUTO: 93.8 FL — SIGNIFICANT CHANGE UP (ref 80–100)
MCV RBC AUTO: 94.5 FL — SIGNIFICANT CHANGE UP (ref 80–100)
MONOCYTES # BLD AUTO: 0.45 K/UL — SIGNIFICANT CHANGE UP (ref 0–0.9)
MONOCYTES # BLD AUTO: 0.8 K/UL — SIGNIFICANT CHANGE UP (ref 0–0.9)
MONOCYTES NFR BLD AUTO: 3.5 % — SIGNIFICANT CHANGE UP (ref 2–14)
MONOCYTES NFR BLD AUTO: 5.7 % — SIGNIFICANT CHANGE UP (ref 2–14)
NEUTROPHILS # BLD AUTO: 9.08 K/UL — HIGH (ref 1.8–7.4)
NEUTROPHILS # BLD AUTO: 9.32 K/UL — HIGH (ref 1.8–7.4)
NEUTROPHILS NFR BLD AUTO: 67 % — SIGNIFICANT CHANGE UP (ref 43–77)
NEUTROPHILS NFR BLD AUTO: 71 % — SIGNIFICANT CHANGE UP (ref 43–77)
NRBC # BLD: 0 /100 WBCS — SIGNIFICANT CHANGE UP (ref 0–0)
PLATELET # BLD AUTO: 17 K/UL — CRITICAL LOW (ref 150–400)
PLATELET # BLD AUTO: 18 K/UL — CRITICAL LOW (ref 150–400)
POTASSIUM SERPL-MCNC: 4 MMOL/L — SIGNIFICANT CHANGE UP (ref 3.5–5.3)
POTASSIUM SERPL-MCNC: 4.2 MMOL/L — SIGNIFICANT CHANGE UP (ref 3.5–5.3)
POTASSIUM SERPL-SCNC: 4 MMOL/L — SIGNIFICANT CHANGE UP (ref 3.5–5.3)
POTASSIUM SERPL-SCNC: 4.2 MMOL/L — SIGNIFICANT CHANGE UP (ref 3.5–5.3)
PROT SERPL-MCNC: 5.9 G/DL — LOW (ref 6–8.3)
RBC # BLD: 3.64 M/UL — LOW (ref 4.2–5.8)
RBC # BLD: 4.2 M/UL — SIGNIFICANT CHANGE UP (ref 4.2–5.8)
RBC # BLD: 4.2 M/UL — SIGNIFICANT CHANGE UP (ref 4.2–5.8)
RBC # FLD: 12.3 % — SIGNIFICANT CHANGE UP (ref 10.3–14.5)
RBC # FLD: 12.4 % — SIGNIFICANT CHANGE UP (ref 10.3–14.5)
RETICS #: 103.3 K/UL — SIGNIFICANT CHANGE UP (ref 25–125)
RETICS/RBC NFR: 2.5 % — SIGNIFICANT CHANGE UP (ref 0.5–2.5)
RH IG SCN BLD-IMP: POSITIVE — SIGNIFICANT CHANGE UP
SODIUM SERPL-SCNC: 140 MMOL/L — SIGNIFICANT CHANGE UP (ref 135–145)
SODIUM SERPL-SCNC: 141 MMOL/L — SIGNIFICANT CHANGE UP (ref 135–145)
WBC # BLD: 12.79 K/UL — HIGH (ref 3.8–10.5)
WBC # BLD: 13.93 K/UL — HIGH (ref 3.8–10.5)
WBC # FLD AUTO: 12.79 K/UL — HIGH (ref 3.8–10.5)
WBC # FLD AUTO: 13.93 K/UL — HIGH (ref 3.8–10.5)

## 2023-07-21 PROCEDURE — 99232 SBSQ HOSP IP/OBS MODERATE 35: CPT | Mod: GC

## 2023-07-21 RX ORDER — FUROSEMIDE 40 MG
20 TABLET ORAL ONCE
Refills: 0 | Status: COMPLETED | OUTPATIENT
Start: 2023-07-21 | End: 2023-07-21

## 2023-07-21 RX ADMIN — Medication 25 MILLIGRAM(S): at 21:14

## 2023-07-21 RX ADMIN — TAMSULOSIN HYDROCHLORIDE 0.8 MILLIGRAM(S): 0.4 CAPSULE ORAL at 21:14

## 2023-07-21 RX ADMIN — ATOVAQUONE 1500 MILLIGRAM(S): 750 SUSPENSION ORAL at 11:40

## 2023-07-21 RX ADMIN — Medication 20 MILLIGRAM(S): at 11:39

## 2023-07-21 RX ADMIN — VALACYCLOVIR 500 MILLIGRAM(S): 500 TABLET, FILM COATED ORAL at 11:40

## 2023-07-21 RX ADMIN — Medication 15 MILLIGRAM(S): at 05:23

## 2023-07-21 RX ADMIN — SIMVASTATIN 40 MILLIGRAM(S): 20 TABLET, FILM COATED ORAL at 21:14

## 2023-07-21 NOTE — PROGRESS NOTE ADULT - ASSESSMENT
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) and Diffuse Erythroderma (initial rash starting February 2023, worsened with May 2023) currently on prednisone taper with first dose Skyrizi ( RISANKIZUMAB-RZAA-  AN IL-23 inhibitor_  # Fever r/o Sepsis:  # Diffuse Erythroderma:  # HLD:  # Herpes:  # DVT ppx:    # Fever r/o Sepsis:-   -he has non covid coronavirus infection; that could  be th  reason for his fever:   -however his chest xray is mostly clar: which does not explain his hypoxia:   -he also has inability to take deep breaths;   -In addition he is hypoxic with clear chest radiograph and have been on high dose steroids without bactrim prophylaxis  -would do cta and see th elung parenchyma:   -send fungitell and sputum for pcp and LDH   -cont to use o2 to keep his sao2 above 90% all the time:   do dopplers as he has significant swelling of his legs  -dw ID   -his ct scan has no pe:  dopplers are negative for pe   and he has linear atelectasis on both sides:  today he is able to do  incentive spirometry upto 3000cc:  on room air : on ambulation yesterday he did not desaturate  -he does desaturate while sleeping : need to monitor his overnight oximetry  :  no pcp suggesting on ct chest  : fungitell slightly high : but is till Dont think it is of very important nature in this clinical condition:  however ID is floowing any way    his oxygenation has improved and did not need oxygen last night   -however today he says he used oxygen  in the night for sleeping:   daytine he is doing pretty good:   # Diffuse Erythroderma:  -he has been on high dosages of steroids which are gradually being taperd:   # HLD:  -on simvastatin  # Herpes:  -on valtrex  # DVT ppx:  -off lovenox  Thrombocytopenia:   -off lovenox:   -plts dropped further today  too : now < 20K: defer to hemonc  - hem onc following:   -no bleeding:   - US spleen noted:  normal spleen     dw id and ACP: and wife

## 2023-07-21 NOTE — PROGRESS NOTE ADULT - SUBJECTIVE AND OBJECTIVE BOX
INTERVAL HPI/OVERNIGHT EVENTS:  No overnight events.     MEDICATIONS  (STANDING):  atovaquone  Suspension 1500 milliGRAM(s) Oral daily  chlorhexidine 2% Cloths 1 Application(s) Topical daily  diphenhydrAMINE 25 milliGRAM(s) Oral at bedtime  hydrOXYzine hydrochloride 25 milliGRAM(s) Oral daily  predniSONE   Tablet 15 milliGRAM(s) Oral daily  simvastatin 40 milliGRAM(s) Oral at bedtime  tamsulosin 0.8 milliGRAM(s) Oral at bedtime  valACYclovir 500 milliGRAM(s) Oral daily    MEDICATIONS  (PRN):  acetaminophen     Tablet .. 650 milliGRAM(s) Oral every 6 hours PRN Temp greater or equal to 38C (100.4F), Mild Pain (1 - 3)  aluminum hydroxide/magnesium hydroxide/simethicone Suspension 30 milliLiter(s) Oral every 4 hours PRN Dyspepsia  melatonin 5 milliGRAM(s) Oral at bedtime PRN Insomnia  ondansetron Injectable 4 milliGRAM(s) IV Push every 8 hours PRN Nausea and/or Vomiting    Allergies    No Known Allergies    Intolerances          VITAL SIGNS:  T(F): 98.6 (07-21-23 @ 05:29)  HR: 97 (07-21-23 @ 05:29)  BP: 114/62 (07-21-23 @ 05:29)  RR: 18 (07-21-23 @ 05:29)  SpO2: 96% (07-21-23 @ 05:29)  Wt(kg): --    PHYSICAL EXAM:    Constitutional: NAD, lying comfortably in bed  Eyes: EOMI, PERRLA  Neck: supple, no masses, no JVD  Respiratory: CTAB; no r/r/w  Cardiovascular: RRR, no M/R/G  Gastrointestinal: +BS, soft, NTND, no hepatosplenomegaly  Extremities: no c/c/e  Neurological: AAOx3, nonfocal    LABS:                        11.1   12.79 )-----------( 18       ( 21 Jul 2023 05:57 )             34.4     07-21    141  |  106  |  18  ----------------------------<  93  4.0   |  26  |  1.06    Ca    9.0      21 Jul 2023 05:57      PT/INR - ( 19 Jul 2023 12:23 )   PT: 12.4 sec;   INR: 1.07 ratio         PTT - ( 19 Jul 2023 12:23 )  PTT:27.2 sec  Urinalysis Basic - ( 21 Jul 2023 05:57 )    Color: x / Appearance: x / SG: x / pH: x  Gluc: 93 mg/dL / Ketone: x  / Bili: x / Urobili: x   Blood: x / Protein: x / Nitrite: x   Leuk Esterase: x / RBC: x / WBC x   Sq Epi: x / Non Sq Epi: x / Bacteria: x        RADIOLOGY & ADDITIONAL TESTS:  Studies reviewed.

## 2023-07-21 NOTE — PROGRESS NOTE ADULT - ASSESSMENT
78-yr-old male with unfounded diagnosis of Cutaneous B-cell lymphoma (patient states he did not have that disease), s/o one session of RT at [facility], lesion disappeared; received Skyrizi few days ago for the diagnosis of Psoriasis.     Hematology consulted for thrombocytopenia following Skyrizi.  Platelets dropping to 18 (7/21) from 262 (7/14) at admission. Blue top today confirming platelets 17. PF4 Ab negative. Patient will need platelet transfusion if any invasive procedure is planned.       PLAN    # Thrombocytopenia   - Patient was previously following at [facility] for a skin lesion which was biopsied, but patient reports it was indeterminate and not a true lymphoma. Was treated with 1 fraction of RT to the lesion with complete response. Has been monitored by [facility], but given absence of active malignancy, was discharged from [facility] and told to follow-up with Dr. Christianson at [facility].   - Received the humanized monoclonal antibody Skyrizi prior to admission via infusion. Reports improvement in his erythroderma. This apparently has an adverse reaction that includes thrombocytopenia. Would get derm's input to ask whether they have seen this effect before and if it is common.   - PF4 Ab negative  - Would send CBC w/ diff, retic count, iron, ferritin, TIBC, B12, folate, fibrinogen, D-dimer, CMP with bilirubin fractionation, LDH, haptoglobin, direct Dasia, HIV, EBV, CMV, Parvovirus, hepatitis panel  - Reviewed smear, no schistocytes no nucleated RBCs, normochromic normocytic RBCs. Neutrophil-predominant leukocytes. True thrombocytopenia.  Will review peripheral smear.   - CBC w diff daily   - Check abdominal US for splenomegaly  - Fungitell 94 - ID to interpret   - Transfuse to maintain platelets >10K, >20K if febrile, and >50K for fevers or planned procedures  - Can follow-up with Dr. Harris at [facility] for count checks. Denies recent weight loss and B-symptoms.       ***************************************************************  Lea Goodrich, PGY4  Fellow Hematology/Oncology  pager: [phone]   Available on Microsoft Teams  After 5pm or on weekends please contact  to page on-call fellow   ***************************************************************

## 2023-07-21 NOTE — PROGRESS NOTE ADULT - ASSESSMENT
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     SIRS/FEVER/ERYTHRODERMA  Presentation with pronounced scaling of skin very suggestive of erythrodermic psoriasis and unclear etiology of acute  leading to hospitalization and fever  very curious history of Primary Cutaneous B-cell Lymphoma -refusing CT imaging for work up here  Infection in June 2023 at [facility] RVP+ for seasonal coronavirus -- now again positive on this admission   June 2022 was SARS-CoV-2 here at [facility] ER  Leukocytosis -- likely reactive d/t steroid for diffuse erythroderma - WBC stable   Fever may be due to recent Skyrizi as this is a humanized MAB, seasonal coronavirus, but low suspicion for bacterial sepsis   - fevers resolved, none since transfer from ED to floor    Hypoxia - may be viral etiology, no PE; patient has been on steroids for ~2 months without PCP ppx   - LDH wnl; afebrile, WBC downtrended/stable, denies having any cough    - Fungitell slightly elevated but CT imaging with no findings c/w PCP and clinically has improved without targeted treatment, low suspicion for PCP pneumonia at this time  Heme/Onc following for thrombocytopenia - may be d/t Skyrizi     Recommendations:  -continue to observe off antibiotics   -Dermatology following - continuing on prednisone taper  - continue atovaquone 1500mg PO daily for PCP ppx while on above   -continue supportive care   -discussed above with patient and wife in detail, he will follow up in our ID office with Dr. Le Anthony next week    Over the weekend Dr. Evens Escobar will be covering for our group.  If you have any questions, concerns or new micro data, please reach out to them at [phone].    Patrick Anderson M.D.  [facility], Division of Infectious Diseases  [phone]  After 5pm on weekdays and all day on weekends - please call [phone]

## 2023-07-21 NOTE — PROGRESS NOTE ADULT - SUBJECTIVE AND OBJECTIVE BOX
BESSIE/Advised I would send mychart message [facility]  RACHELLE Manzanares Y. Patel, S. Shah, G. Shawn  [phone]  ([phone] - weekdays after 5pm and weekends)    Name: ZAHRA LONG  Age/Gender: 78y Male  MRN: [id number]    Interval History:  Patient seen and examined this morning.   No new complaints, concerned about his plts  Notes reviewed.   No concerning overnight events.  Afebrile. Wife at bedside  Allergies: No Known Allergies      Objective:  Vitals:   T(F): 97.8 (07-21-23 @ 11:12), Max: 98.7 (07-21-23 @ 05:17)  HR: 91 (07-21-23 @ 11:12) (91 - 103)  BP: 121/69 (07-21-23 @ 11:12) (114/62 - 142/74)  RR: 18 (07-21-23 @ 11:12) (18 - 18)  SpO2: 92% (07-21-23 @ 11:12) (92% - 96%)  Physical Examination:  General: no acute distress, on RA  HEENT: NC/AT, anicteric, neck supple  Respiratory: clear to auscultation bilaterally  Cardiovascular: S1 and S2 present, normal rate  Gastrointestinal: soft, nontender, nondistended  Extremities: b/l LE edema, no cyanosis  Skin: generalized erythematous rash appears dec  Psych: appropriate affect and mood for situation  Lines: PIV    Laboratory Studies:  CBC:                       11.1   12.79 )-----------( 18       ( 21 Jul 2023 05:57 )             34.4     WBC Trend:  12.79 07-21-23 @ 05:57  12.29 07-20-23 @ 05:51  12.24 07-19-23 @ 10:00  11.80 07-18-23 @ 06:19  12.82 07-17-23 @ 07:12  12.43 07-16-23 @ 05:54  13.10 07-15-23 @ 06:02    CMP: 07-21    141  |  106  |  18  ----------------------------<  93  4.0   |  26  |  1.06    Ca    9.0      21 Jul 2023 05:57      Creatinine: 1.06 mg/dL (07-21-23 @ 05:57)  Creatinine: 0.98 mg/dL (07-19-23 @ 10:00)  Creatinine: 1.09 mg/dL (07-18-23 @ 06:27)  Creatinine: 1.14 mg/dL (07-16-23 @ 05:53)  Creatinine: 1.11 mg/dL (07-15-23 @ 06:01)      Microbiology: reviewed   Culture - Blood (collected 07-14-23 @ 10:00)  Source: .Blood Blood-Peripheral  Final Report (07-19-23 @ 15:07):    No growth at 5 days    Culture - Urine (collected 07-14-23 @ 09:58)  Source: Clean Catch Clean Catch (Midstream)  Final Report (07-15-23 @ 15:28):    No growth    Culture - Blood (collected 07-14-23 @ 09:23)  Source: .Blood Blood-Peripheral  Final Report (07-19-23 @ 15:07):    No growth at 5 days    Radiology: reviewed     Medications:  acetaminophen     Tablet .. 650 milliGRAM(s) Oral every 6 hours PRN  aluminum hydroxide/magnesium hydroxide/simethicone Suspension 30 milliLiter(s) Oral every 4 hours PRN  atovaquone  Suspension 1500 milliGRAM(s) Oral daily  chlorhexidine 2% Cloths 1 Application(s) Topical daily  diphenhydrAMINE 25 milliGRAM(s) Oral at bedtime  furosemide    Tablet 20 milliGRAM(s) Oral once  hydrOXYzine hydrochloride 25 milliGRAM(s) Oral daily  melatonin 5 milliGRAM(s) Oral at bedtime PRN  ondansetron Injectable 4 milliGRAM(s) IV Push every 8 hours PRN  predniSONE   Tablet 15 milliGRAM(s) Oral daily  simvastatin 40 milliGRAM(s) Oral at bedtime  tamsulosin 0.8 milliGRAM(s) Oral at bedtime  valACYclovir 500 milliGRAM(s) Oral daily    Current Antimicrobials:  atovaquone  Suspension 1500 milliGRAM(s) Oral daily  valACYclovir 500 milliGRAM(s) Oral daily    Prior/Completed Antimicrobials:  cefepime   IVPB  vancomycin  IVPB.

## 2023-07-21 NOTE — PROGRESS NOTE ADULT - SUBJECTIVE AND OBJECTIVE BOX
ETHAN ZAHRA 78y Male      Patient is a 78y old  Male who presents with a chief complaint of Fever (21 Jul 2023 14:47)        INTERVAL HPI/OVERNIGHT EVENTS: No acute events overnight. Patient was seen and evaluated at the bedside. The patient denies pain. Vitals stable. Patient denies fever/chills, chest pain, shortness of breath, abdominal pain, headaches, nausea/vomiting, and diarrhea/constipation.      PHYSICAL EXAM:  GENERAL: NAD  HEAD:  Normocephalic  EYES:  conjunctiva and sclera clear  ENMT: Moist mucous membranes  NECK: Supple  NERVOUS SYSTEM:  Alert, awake  CHEST/LUNG: Good air exchange bilaterally, no wheeze  HEART: Regular rate and rhythm        Vital Signs Last 24 Hrs  T(C): 36.6 (21 Jul 2023 11:12), Max: 37.1 (21 Jul 2023 05:17)  T(F): 97.8 (21 Jul 2023 11:12), Max: 98.7 (21 Jul 2023 05:17)  HR: 91 (21 Jul 2023 11:12) (91 - 103)  BP: 121/69 (21 Jul 2023 11:12) (114/62 - 142/74)  BP(mean): --  RR: 18 (21 Jul 2023 11:12) (18 - 18)  SpO2: 92% (21 Jul 2023 11:12) (92% - 96%)    Parameters below as of 21 Jul 2023 11:12  Patient On (Oxygen Delivery Method): nasal cannula  O2 Flow (L/min): 2        MEDICATIONS  (STANDING):  chlorhexidine 2% Cloths 1 Application(s) Topical daily  diphenhydrAMINE 25 milliGRAM(s) Oral at bedtime  hydrOXYzine hydrochloride 25 milliGRAM(s) Oral daily  predniSONE   Tablet 15 milliGRAM(s) Oral daily  simvastatin 40 milliGRAM(s) Oral at bedtime  tamsulosin 0.8 milliGRAM(s) Oral at bedtime  valACYclovir 500 milliGRAM(s) Oral daily    MEDICATIONS  (PRN):  acetaminophen     Tablet .. 650 milliGRAM(s) Oral every 6 hours PRN Temp greater or equal to 38C (100.4F), Mild Pain (1 - 3)  aluminum hydroxide/magnesium hydroxide/simethicone Suspension 30 milliLiter(s) Oral every 4 hours PRN Dyspepsia  melatonin 5 milliGRAM(s) Oral at bedtime PRN Insomnia  ondansetron Injectable 4 milliGRAM(s) IV Push every 8 hours PRN Nausea and/or Vomiting      Consultant(s) Notes Reviewed:  [X] YES  [ ] NO  Care Discussed with Other Providers [X] YES  [ ] NO  Imaging Personally Reviewed:  [X] YES  [ ] NO      LABS:                        11.1   12.79 )-----------( 18       ( 21 Jul 2023 05:57 )             34.4     07-21    141  |  106  |  18  ----------------------------<  93  4.0   |  26  |  1.06    Ca    9.0      21 Jul 2023 05:57          Urinalysis Basic - ( 21 Jul 2023 05:57 )    Color: x / Appearance: x / SG: x / pH: x  Gluc: 93 mg/dL / Ketone: x  / Bili: x / Urobili: x   Blood: x / Protein: x / Nitrite: x   Leuk Esterase: x / RBC: x / WBC x   Sq Epi: x / Non Sq Epi: x / Bacteria: x

## 2023-07-21 NOTE — PROGRESS NOTE ADULT - SUBJECTIVE AND OBJECTIVE BOX
Patient is a 78y old  Male who presents with a chief complaint of Fever (21 Jul 2023 10:51)      SUBJECTIVE / OVERNIGHT EVENTS:    Patient seen and examined. no bleeding. co le swelling.      Vital Signs Last 24 Hrs  T(C): 36.6 (21 Jul 2023 11:12), Max: 37.1 (21 Jul 2023 05:17)  T(F): 97.8 (21 Jul 2023 11:12), Max: 98.7 (21 Jul 2023 05:17)  HR: 91 (21 Jul 2023 11:12) (91 - 103)  BP: 121/69 (21 Jul 2023 11:12) (114/62 - 142/74)  BP(mean): --  RR: 18 (21 Jul 2023 11:12) (18 - 18)  SpO2: 92% (21 Jul 2023 11:12) (92% - 96%)    Parameters below as of 21 Jul 2023 11:12  Patient On (Oxygen Delivery Method): nasal cannula  O2 Flow (L/min): 2    I&O's Summary      PE:  GENERAL: NAD, AAOx3  CHEST/LUNG: CTABL, No wheeze  HEART: Regular rate and rhythm; + murmur  ABDOMEN: Soft, Nontender, Nondistended; Bowel sounds present  EXTREMITIES:  2+ Peripheral Pulses, +2 le edema  SKIN: erythematous over body  NEURO: No focal deficits    LABS:                        11.1   12.79 )-----------( 18       ( 21 Jul 2023 05:57 )             34.4     07-21    141  |  106  |  18  ----------------------------<  93  4.0   |  26  |  1.06    Ca    9.0      21 Jul 2023 05:57      PT/INR - ( 19 Jul 2023 12:23 )   PT: 12.4 sec;   INR: 1.07 ratio         PTT - ( 19 Jul 2023 12:23 )  PTT:27.2 sec  CAPILLARY BLOOD GLUCOSE            Urinalysis Basic - ( 21 Jul 2023 05:57 )    Color: x / Appearance: x / SG: x / pH: x  Gluc: 93 mg/dL / Ketone: x  / Bili: x / Urobili: x   Blood: x / Protein: x / Nitrite: x   Leuk Esterase: x / RBC: x / WBC x   Sq Epi: x / Non Sq Epi: x / Bacteria: x        RADIOLOGY & ADDITIONAL TESTS:    Imaging Personally Reviewed:  [x] YES  [ ] NO    Consultant(s) Notes Reviewed:  [x] YES  [ ] NO    MEDICATIONS  (STANDING):  atovaquone  Suspension 1500 milliGRAM(s) Oral daily  chlorhexidine 2% Cloths 1 Application(s) Topical daily  diphenhydrAMINE 25 milliGRAM(s) Oral at bedtime  furosemide    Tablet 20 milliGRAM(s) Oral once  hydrOXYzine hydrochloride 25 milliGRAM(s) Oral daily  predniSONE   Tablet 15 milliGRAM(s) Oral daily  simvastatin 40 milliGRAM(s) Oral at bedtime  tamsulosin 0.8 milliGRAM(s) Oral at bedtime  valACYclovir 500 milliGRAM(s) Oral daily    MEDICATIONS  (PRN):  acetaminophen     Tablet .. 650 milliGRAM(s) Oral every 6 hours PRN Temp greater or equal to 38C (100.4F), Mild Pain (1 - 3)  aluminum hydroxide/magnesium hydroxide/simethicone Suspension 30 milliLiter(s) Oral every 4 hours PRN Dyspepsia  melatonin 5 milliGRAM(s) Oral at bedtime PRN Insomnia  ondansetron Injectable 4 milliGRAM(s) IV Push every 8 hours PRN Nausea and/or Vomiting      Care Discussed with Consultants/Other Providers [x] YES  [ ] NO    HEALTH ISSUES - PROBLEM Dx:  Hearing loss

## 2023-07-21 NOTE — PROGRESS NOTE ADULT - ASSESSMENT
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     Hematology consulted for thrombocytopenia following Skyrizi.  Platelets dropping to 18 (7/21) from 262 (7/14) at admission. Blue top today confirming platelets 17. PF4 Ab negative. No splenomegaly Patient will need platelet transfusion if any invasive procedure is planned.     Recommend to primary team to stop atovaquone and if needed switch to Bactrim. Atovaquone has been known to cause thrombocytopenia. Will see patient once labs are sent and are resulted.    # Thrombocytopenia   - Would send CBC w/ diff, retic count, iron, ferritin, TIBC, B12, folate, fibrinogen, D-dimer, CMP with bilirubin fractionation, LDH, haptoglobin, direct Dasia, HIV, EBV, CMV, Parvovirus, hepatitis panel, babesia PCR  - Peripheral smear: many reticulocytes, RBCs without central pallor, tear drop cells, large platelets  - CBC w diff daily   - Transfuse to maintain platelets >10K, >20K if febrile, and >50K for fevers or planned procedures  - Stop atovaquone and monitor CBC w/ diff. Would not recommend PCP ppx with either Mepron nor Bactrim given the risk for thrombocytopenia seen with these drugs.   - Can follow-up with Dr. Harris at [facility] for count checks. Denies recent weight loss and B-symptoms     Brent Jamison MD, PGY-5  Hematology/Medical Oncology Fellow  Pager: [phone]  Available on [employer] Teams  After 5pm or on weekends please contact  to page on-call fellow

## 2023-07-21 NOTE — PROGRESS NOTE ADULT - SUBJECTIVE AND OBJECTIVE BOX
Date of Service: 07-21-23 @ 14:47    Patient is a 78y old  Male who presents with a chief complaint of Fever (21 Jul 2023 11:34)      Any change in ROS: no resp symptoms     MEDICATIONS  (STANDING):  chlorhexidine 2% Cloths 1 Application(s) Topical daily  diphenhydrAMINE 25 milliGRAM(s) Oral at bedtime  hydrOXYzine hydrochloride 25 milliGRAM(s) Oral daily  predniSONE   Tablet 15 milliGRAM(s) Oral daily  simvastatin 40 milliGRAM(s) Oral at bedtime  tamsulosin 0.8 milliGRAM(s) Oral at bedtime  valACYclovir 500 milliGRAM(s) Oral daily    MEDICATIONS  (PRN):  acetaminophen     Tablet .. 650 milliGRAM(s) Oral every 6 hours PRN Temp greater or equal to 38C (100.4F), Mild Pain (1 - 3)  aluminum hydroxide/magnesium hydroxide/simethicone Suspension 30 milliLiter(s) Oral every 4 hours PRN Dyspepsia  melatonin 5 milliGRAM(s) Oral at bedtime PRN Insomnia  ondansetron Injectable 4 milliGRAM(s) IV Push every 8 hours PRN Nausea and/or Vomiting    Vital Signs Last 24 Hrs  T(C): 36.6 (21 Jul 2023 11:12), Max: 37.1 (21 Jul 2023 05:17)  T(F): 97.8 (21 Jul 2023 11:12), Max: 98.7 (21 Jul 2023 05:17)  HR: 91 (21 Jul 2023 11:12) (91 - 103)  BP: 121/69 (21 Jul 2023 11:12) (114/62 - 142/74)  BP(mean): --  RR: 18 (21 Jul 2023 11:12) (18 - 18)  SpO2: 92% (21 Jul 2023 11:12) (92% - 96%)    Parameters below as of 21 Jul 2023 11:12  Patient On (Oxygen Delivery Method): nasal cannula  O2 Flow (L/min): 2      I&O's Summary        Physical Exam:   GENERAL: NAD, well-groomed, well-developed  HEENT: JONY/   Atraumatic, Normocephalic  ENMT: No tonsillar erythema, exudates, or enlargement; Moist mucous membranes, Good dentition, No lesions  NECK: Supple, No JVD, Normal thyroid  CHEST/LUNG: Clear to auscultaion  CVS: Regular rate and rhythm; No murmurs, rubs, or gallops  GI: : Soft, Nontender, Nondistended; Bowel sounds present  NERVOUS SYSTEM:  Alert & Oriented X3  EXTREMITIES:  2+ Peripheral Pulses, No clubbing, cyanosis, or edema  LYMPH: No lymphadenopathy noted  SKIN: erytheroderma  ENDOCRINOLOGY: No Thyromegaly  PSYCH: Appropriate    Labs:                              11.1   12.79 )-----------( 18       ( 21 Jul 2023 05:57 )             34.4                         10.9   12.29 )-----------( 27       ( 20 Jul 2023 05:51 )             33.2                         11.0   12.24 )-----------( 43       ( 19 Jul 2023 10:00 )             34.3                         11.2   11.80 )-----------( 96       ( 18 Jul 2023 06:19 )             34.4     07-21    141  |  106  |  18  ----------------------------<  93  4.0   |  26  |  1.06  07-19    140  |  104  |  17  ----------------------------<  182<H>  4.1   |  25  |  0.98  07-18    138  |  100  |  16  ----------------------------<  97  4.1   |  26  |  1.09    Ca    9.0      21 Jul 2023 05:57    TPro  5.3<L>  /  Alb  2.7<L>  /  TBili  0.4  /  DBili  x   /  AST  24  /  ALT  33  /  AlkPhos  52  07-18    CAPILLARY BLOOD GLUCOSE              Urinalysis Basic - ( 21 Jul 2023 05:57 )    Color: x / Appearance: x / SG: x / pH: x  Gluc: 93 mg/dL / Ketone: x  / Bili: x / Urobili: x   Blood: x / Protein: x / Nitrite: x   Leuk Esterase: x / RBC: x / WBC x   Sq Epi: x / Non Sq Epi: x / Bacteria: x      ra< from: US Spleen (07.20.23 @ 13:27) >    ACC: [id number] EXAM:  US SPLEEN   ORDERED BY: SETH MCKEON     PROCEDURE DATE:  07/20/2023          INTERPRETATION:  CLINICAL INFORMATION: 70 year-old male with history of   cutaneous B-cell lymphoma presenting with thrombocytopenia. Eval for   splenomegaly    COMPARISON: CT angiogram chest 7/17/2023    TECHNIQUE: Sonography of the spleen    FINDINGS:    Spleen: Normal in appearance measuring 11.1 x 4.6 x 4.5cm with a volume   of 294cm^3      IMPRESSION:    No splenomegaly    --- End of Report---      < end of copied text >        RECENT CULTURES:        RESPIRATORY CULTURES:          Studies  Chest X-RAY  CT SCAN Chest   Venous Dopplers: LE:   CT Abdomen  Others

## 2023-07-21 NOTE — PROGRESS NOTE ADULT - ATTENDING COMMENTS
Erythroderma with recent skin biopsies suggesting psoriasis. Now s/p 1 week skyrizi with some improvement in itch and erythema. Agree with tapering down steroids as he has been on them since Mid May in order to mitigate HPA axis suppression and risk of rebound rash. Lymphadenopathy on CTA appears mild therefore no need to pursue further inpatient. Defer further workup to outpatient f/u with Dr. Christianson (with whom case was discussed).
78-yr-old male with unfounded diagnosis of Cutaneous B-cell lymphoma (patient states he did not have that disease), s/o one session of RT at [facility], lesion disappeared; received Skyrizi few days ago for the diagnosis of Psoriasis. He is seen in consult for thrombocytopenia following Skyrizi. Thrombocytopenia is one of the side effects of Skyrizi. However, the platelet count did not recover over the past few days as expected. On the contrary, it continued going down. Today's count 18K. This raises the suspicion for another medication that may be playing role in his thrombocytopenia. 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 Please hold  Atovaquone and do not start Bactrim for now. CBC daily.  Will follow up with surveillance. Outpatient Hematology follow up. Please note that patient will need platelet transfusion if any invasive procedure is planned.

## 2023-07-21 NOTE — CHART NOTE - NSCHARTNOTEFT_GEN_A_CORE
78-yr-old male with unfounded diagnosis of Cutaneous B-cell lymphoma (patient states he did not have that disease), s/o one session of RT at [facility], lesion disappeared; received Skyrizi few days ago for the diagnosis of Psoriasis.     Hematology consulted for thrombocytopenia following Skyrizi.  Platelets dropping to 18 (7/21) from 262 (7/14) at admission. Blue top today confirming platelets 17. PF4 Ab negative. No splenomegaly Patient will need platelet transfusion if any invasive procedure is planned.     Recommend to primary team to stop atovaquone and if needed switch to Bactrim. Atovaquone has been known to cause thrombocytopenia. Will see patient once labs are sent and are resulted.    # Thrombocytopenia   - Would send CBC w/ diff, retic count, iron, ferritin, TIBC, B12, folate, fibrinogen, D-dimer, CMP with bilirubin fractionation, LDH, haptoglobin, direct Dasia, HIV, EBV, CMV, Parvovirus, hepatitis panel  - Will review peripheral smear.   - CBC w diff daily   - Transfuse to maintain platelets >10K, >20K if febrile, and >50K for fevers or planned procedures  - Stop atovaquone and monitor CBC, can switch to Bactrim  - Can follow-up with Dr. Harris at [facility] for count checks. Denies recent weight loss and B-symptoms       ***************************************************************  Lea Goodrich, PGY4  Fellow Hematology/Oncology  pager: [phone]   Available on Microsoft Teams  After 5pm or on weekends please contact  to page on-call fellow   *************************************************************** 78-yr-old male with unfounded diagnosis of Cutaneous B-cell lymphoma (patient states he did not have that disease), s/o one session of RT at [facility] [place], lesion disappeared; received Skyrizi few days ago for the diagnosis of Psoriasis.     Hematology consulted for thrombocytopenia following Skyrizi.  Platelets dropping to 18 (7/21) from 262 (7/14) at admission. Blue top today confirming platelets 17. PF4 Ab negative. No splenomegaly Patient will need platelet transfusion if any invasive procedure is planned.     Recommend to primary team to stop atovaquone and if needed switch to Bactrim. Atovaquone has been known to cause thrombocytopenia. Will see patient once labs are sent and are resulted.    # Thrombocytopenia   - Would send CBC w/ diff, retic count, iron, ferritin, TIBC, B12, folate, fibrinogen, D-dimer, CMP with bilirubin fractionation, LDH, haptoglobin, direct Dasia, HIV, EBV, CMV, Parvovirus, hepatitis panel, babesia PCR  - Will review peripheral smear.   - CBC w diff daily   - Transfuse to maintain platelets >10K, >20K if febrile, and >50K for fevers or planned procedures  - Stop atovaquone and monitor CBC, can switch to Bactrim  - Can follow-up with Dr. Harris at [facility] for count checks. Denies recent weight loss and B-symptoms       ***************************************************************  Lea Goodrich, PGY4  Fellow Hematology/Oncology  pager: [phone]   Available on Microsoft Teams  After 5pm or on weekends please contact  to page on-call fellow   *************************************************************** 78-yr-old male with unfounded diagnosis of Cutaneous B-cell lymphoma (patient states he did not have that disease), s/o one session of RT at [facility] [place], lesion disappeared; received Skyrizi few days ago for the diagnosis of Psoriasis.     Hematology consulted for thrombocytopenia following Skyrizi.  Platelets dropping to 18 (7/21) from 262 (7/14) at admission. Blue top today confirming platelets 17. PF4 Ab negative. No splenomegaly Patient will need platelet transfusion if any invasive procedure is planned.     Recommend to primary team to stop atovaquone and if needed switch to Bactrim. Atovaquone has been known to cause thrombocytopenia. Will see patient once labs are sent and are resulted.    # Thrombocytopenia   - Would send CBC w/ diff, retic count, iron, ferritin, TIBC, B12, folate, fibrinogen, D-dimer, CMP with bilirubin fractionation, LDH, haptoglobin, direct Dasia, HIV, EBV, CMV, Parvovirus, hepatitis panel, babesia PCR  - Peripheral smear: many reticulocytes, RBCs without central pallor, tear drop cells  - CBC w diff daily   - Transfuse to maintain platelets >10K, >20K if febrile, and >50K for fevers or planned procedures  - Stop atovaquone and monitor CBC w/ diff  - Can follow-up with Dr. Harris at [facility] for count checks. Denies recent weight loss and B-symptoms       ***************************************************************  Lea Goodrich, PGY4  Fellow Hematology/Oncology  pager: [phone]   Available on Microsoft Teams  After 5pm or on weekends please contact  to page on-call fellow   *************************************************************** 78-yr-old male with unfounded diagnosis of Cutaneous B-cell lymphoma (patient states he did not have that disease), s/o one session of RT at [facility], lesion disappeared; received Skyrizi few days ago for the diagnosis of Psoriasis.     Hematology consulted for thrombocytopenia following Skyrizi.  Platelets dropping to 18 (7/21) from 262 (7/14) at admission. Blue top today confirming platelets 17. PF4 Ab negative. No splenomegaly Patient will need platelet transfusion if any invasive procedure is planned.     Recommend to primary team to stop atovaquone and if needed switch to Bactrim. Atovaquone has been known to cause thrombocytopenia. Will see patient once labs are sent and are resulted.    # Thrombocytopenia   - Would send CBC w/ diff, retic count, iron, ferritin, TIBC, B12, folate, fibrinogen, D-dimer, CMP with bilirubin fractionation, LDH, haptoglobin, direct Dasia, HIV, EBV, CMV, Parvovirus, hepatitis panel, babesia PCR  - Peripheral smear: many reticulocytes, RBCs without central pallor, tear drop cells, large platelets  - CBC w diff daily   - Transfuse to maintain platelets >10K, >20K if febrile, and >50K for fevers or planned procedures  - Stop atovaquone and monitor CBC w/ diff  - Can follow-up with Dr. Harris at [facility] for count checks. Denies recent weight loss and B-symptoms       ***************************************************************  Lea Goodrich, PGY4  Fellow Hematology/Oncology  pager: [phone]   Available on [employer] Teams  After 5pm or on weekends please contact  to page on-call fellow   ***************************************************************

## 2023-07-21 NOTE — PROGRESS NOTE ADULT - ASSESSMENT
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 Presents to [facility] for worsening fever.    # thrombocyotpenia  - appreciate heme recs, 2/2 skyrizi? fu derm if have seen this side effect  - cont to monitor, transfuse <10K, <20k if blding  - pt refusing CT A/P  - spleen US no splenomegaly  - labs per heme ordered - CBC w/ diff, retic count, iron, ferritin, TIBC, B12, folate, fibrinogen, D-dimer, CMP with bilirubin fractionation, LDH, haptoglobin, direct Dasia, HIV, EBV, CMV, Parvovirus, hepatitis panel    # Fever   - no evidence of acute pulmonary disease  - RVP +seasonal coronavirus (not COVID)  - BCx/UCx NGTD  - dw ID re elevated fungitell lvl, CT chest no signs of PCP, cont atovaquone for pcp ppx and outpt fu Dr Carmita Anthony ID outpt next week in office    # Diffuse Erythroderma  # LE edema  - C/w Prednisone taper per derm, outpt fu  - le doppler neg dvt    # Hypoxia, nocturnal  - cxr neg ; cta chest negative for PE, likely due to atelectasis , deconditioning   - incentive spirometer, increasing mobilization, PT OOB  - requires O2 at night per pulm, CM for home O2    # Sinus Tachycardia   - echo reviewed, PE ruled out, no fever  - stable     # HLD  - C/w Statin    # Herpes  - C/w Valtrex    DVT ppx: Lovenox held for thrombocytopenia    dw pt and wife    dispo fu heme recs    [facility]  [phone] 78 year old male with PMHx Unspecified Autoimmune Skin Condition (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 Presents to [facility] for worsening fever.    # thrombocyotpenia  - appreciate heme recs, 2/2 skyrizi? fu derm if have seen this side effect  - cont to monitor, transfuse <10K, <20k if blding  - pt refusing CT A/P  - spleen US no splenomegaly  - labs per heme ordered - CBC w/ diff, retic count, iron, ferritin, TIBC, B12, folate, fibrinogen, D-dimer, CMP with bilirubin fractionation, LDH, haptoglobin, direct Dasia, HIV, EBV, CMV, Parvovirus, hepatitis panel    # Fever   - no evidence of acute pulmonary disease  - RVP +seasonal coronavirus (not COVID)  - BCx/UCx NGTD  - dw ID re elevated fungitell lvl, CT chest no signs of PCP, cont atovaquone for pcp ppx and outpt fu Dr Carmita Anthony ID outpt next week in office    # Diffuse Erythroderma  # LE edema  - C/w Prednisone taper per derm, outpt fu  - le doppler neg dvt  - will give lasix 20mg x 1  - cannot wear compression stockings 2/2 erythroderma and irritation    # Hypoxia, nocturnal  - cxr neg ; cta chest negative for PE, likely due to atelectasis , deconditioning   - incentive spirometer, increasing mobilization, PT OOB  - requires O2 at night per pulm, CM for home O2    # Sinus Tachycardia   - echo reviewed, PE ruled out, no fever  - stable     # HLD  - C/w Statin    # Herpes  - C/w Valtrex    DVT ppx: Lovenox held for thrombocytopenia    dw pt and wife    dispo fu heme recs    [facility]  [phone]

## 2023-07-22 LAB
BASOPHILS # BLD AUTO: 0.06 K/UL — SIGNIFICANT CHANGE UP (ref 0–0.2)
BASOPHILS NFR BLD AUTO: 0.4 % — SIGNIFICANT CHANGE UP (ref 0–2)
CLOSURE TME COLL+EPINEP BLD: 25 K/UL — LOW (ref 150–400)
EBV EA AB SER IA-ACNC: 54.4 U/ML — HIGH
EBV EA AB TITR SER IF: POSITIVE
EBV EA IGG SER-ACNC: POSITIVE
EBV NA IGG SER IA-ACNC: >600 U/ML — HIGH
EBV PATRN SPEC IB-IMP: SIGNIFICANT CHANGE UP
EBV VCA IGG AVIDITY SER QL IA: POSITIVE
EBV VCA IGM SER IA-ACNC: 245 U/ML — HIGH
EBV VCA IGM SER IA-ACNC: <10 U/ML — SIGNIFICANT CHANGE UP
EBV VCA IGM TITR FLD: NEGATIVE — SIGNIFICANT CHANGE UP
EOSINOPHIL # BLD AUTO: 0.53 K/UL — HIGH (ref 0–0.5)
EOSINOPHIL NFR BLD AUTO: 3.7 % — SIGNIFICANT CHANGE UP (ref 0–6)
FERRITIN SERPL-MCNC: 322 NG/ML — SIGNIFICANT CHANGE UP (ref 30–400)
FOLATE SERPL-MCNC: 14.1 NG/ML — SIGNIFICANT CHANGE UP
HAPTOGLOB SERPL-MCNC: 301 MG/DL — HIGH (ref 34–200)
HAV IGM SER-ACNC: SIGNIFICANT CHANGE UP
HBV CORE IGM SER-ACNC: SIGNIFICANT CHANGE UP
HBV SURFACE AG SER-ACNC: SIGNIFICANT CHANGE UP
HCT VFR BLD CALC: 35 % — LOW (ref 39–50)
HCV AB S/CO SERPL IA: 0.06 S/CO — SIGNIFICANT CHANGE UP (ref 0–0.99)
HCV AB SERPL-IMP: SIGNIFICANT CHANGE UP
HGB BLD-MCNC: 11.4 G/DL — LOW (ref 13–17)
HIV 1+2 AB+HIV1 P24 AG SERPL QL IA: SIGNIFICANT CHANGE UP
IMM GRANULOCYTES NFR BLD AUTO: 8.6 % — HIGH (ref 0–0.9)
IRON SATN MFR SERPL: 12 % — LOW (ref 16–55)
IRON SATN MFR SERPL: 33 UG/DL — LOW (ref 45–165)
LYMPHOCYTES # BLD AUTO: 21.9 % — SIGNIFICANT CHANGE UP (ref 13–44)
LYMPHOCYTES # BLD AUTO: 3.15 K/UL — SIGNIFICANT CHANGE UP (ref 1–3.3)
MCHC RBC-ENTMCNC: 31 PG — SIGNIFICANT CHANGE UP (ref 27–34)
MCHC RBC-ENTMCNC: 32.6 GM/DL — SIGNIFICANT CHANGE UP (ref 32–36)
MCV RBC AUTO: 95.1 FL — SIGNIFICANT CHANGE UP (ref 80–100)
MONOCYTES # BLD AUTO: 1.01 K/UL — HIGH (ref 0–0.9)
MONOCYTES NFR BLD AUTO: 7 % — SIGNIFICANT CHANGE UP (ref 2–14)
NEUTROPHILS # BLD AUTO: 8.37 K/UL — HIGH (ref 1.8–7.4)
NEUTROPHILS NFR BLD AUTO: 58.4 % — SIGNIFICANT CHANGE UP (ref 43–77)
NRBC # BLD: 0 /100 WBCS — SIGNIFICANT CHANGE UP (ref 0–0)
PLATELET # BLD AUTO: 13 K/UL — CRITICAL LOW (ref 150–400)
RBC # BLD: 3.68 M/UL — LOW (ref 4.2–5.8)
RBC # FLD: 12.4 % — SIGNIFICANT CHANGE UP (ref 10.3–14.5)
TIBC SERPL-MCNC: 287 UG/DL — SIGNIFICANT CHANGE UP (ref 220–430)
UIBC SERPL-MCNC: 253 UG/DL — SIGNIFICANT CHANGE UP (ref 110–370)
VIT B12 SERPL-MCNC: 656 PG/ML — SIGNIFICANT CHANGE UP (ref 232–1245)
WBC # BLD: 14.36 K/UL — HIGH (ref 3.8–10.5)
WBC # FLD AUTO: 14.36 K/UL — HIGH (ref 3.8–10.5)

## 2023-07-22 RX ORDER — FUROSEMIDE 40 MG
20 TABLET ORAL ONCE
Refills: 0 | Status: COMPLETED | OUTPATIENT
Start: 2023-07-22 | End: 2023-07-22

## 2023-07-22 RX ADMIN — Medication 15 MILLIGRAM(S): at 05:39

## 2023-07-22 RX ADMIN — Medication 25 MILLIGRAM(S): at 21:22

## 2023-07-22 RX ADMIN — SIMVASTATIN 40 MILLIGRAM(S): 20 TABLET, FILM COATED ORAL at 21:22

## 2023-07-22 RX ADMIN — Medication 20 MILLIGRAM(S): at 09:45

## 2023-07-22 RX ADMIN — TAMSULOSIN HYDROCHLORIDE 0.8 MILLIGRAM(S): 0.4 CAPSULE ORAL at 21:22

## 2023-07-22 RX ADMIN — VALACYCLOVIR 500 MILLIGRAM(S): 500 TABLET, FILM COATED ORAL at 11:36

## 2023-07-22 RX ADMIN — CHLORHEXIDINE GLUCONATE 1 APPLICATION(S): 213 SOLUTION TOPICAL at 11:35

## 2023-07-22 NOTE — PROGRESS NOTE ADULT - ASSESSMENT
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 Presents to [facility] for worsening fever.    # thrombocyotpenia  - 2/2 skyrizi vs atovaquone vs EBV infection?  - cont to monitor, transfuse <10K, <20k if blding  - pt refusing CT A/P  - spleen US no splenomegaly  - labs per heme ordered - CBC w/ diff, retic count, iron, ferritin, TIBC, B12, folate, fibrinogen, D-dimer, CMP with bilirubin fractionation, LDH, haptoglobin, direct Dasia, HIV, EBV, CMV, Parvovirus, hepatitis panel    # Fever   - no evidence of acute pulmonary disease  - RVP +seasonal coronavirus (not COVID)  - BCx/UCx NGTD  - dw ID re elevated fungitell lvl, CT chest no signs of PCP, cont atovaquone for pcp ppx and outpt fu Dr Carmita Anthony ID outpt next week in office    # Diffuse Erythroderma  # LE edema  - C/w Prednisone taper per derm, outpt fu  - le doppler neg dvt  - will give lasix 20mg x 1  - cannot wear compression stockings 2/2 erythroderma and irritation    # Hypoxia, nocturnal  - cxr neg ; cta chest negative for PE, likely due to atelectasis , deconditioning   - incentive spirometer, increasing mobilization, PT OOB  - requires O2 at night per pulm, CM for home O2    # Sinus Tachycardia   - echo reviewed, PE ruled out, no fever  - stable     # HLD  - C/w Statin    # Herpes  - C/w Valtrex    DVT ppx: Lovenox held for thrombocytopenia    dw pt and wife    [facility]  [phone] 78 year old male with PMHx Unspecified Autoimmune Skin Condition (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 Presents to [facility] for worsening fever.    # thrombocyotpenia  - 2/2 skyrizi vs atovaquone vs EBV infection?  - cont to monitor, transfuse <10K, <20k if blding, <50k if febrile  - pt refusing CT A/P, spleen US no splenomegaly  - fu rest of heme labs  - hold atovaquone and bactrim  - appreciate heme recs  - repeat blue top this afternoon and transfuse if platelets less 10k    # Fever, resolved  - RVP + coronavirus (not COVID)  - BCx/UCx NGTD  - dw ID re elevated fungitell lvl, CT chest no signs of PCP, cont atovaquone for pcp ppx and outpt fu Dr Carmita Anthony ID outpt next week in office    # Diffuse Erythroderma  # LE edema  - C/w Prednisone taper per derm, outpt fu  - le doppler neg dvt  - lasix 20mg PRN  - cannot wear compression stockings 2/2 erythroderma and irritation    # Hypoxia, nocturnal  - cxr neg ; cta chest negative for PE, likely due to atelectasis , deconditioning   - incentive spirometer, increasing mobilization, PT OOB  - improving    # Sinus Tachycardia   - echo reviewed, PE ruled out, no fever  - stable     # HLD  - C/w Statin    # Herpes  - C/w Valtrex    DVT ppx: Lovenox held for thrombocytopenia    dw pt and wife    [facility]  [phone]

## 2023-07-22 NOTE — PROGRESS NOTE ADULT - SUBJECTIVE AND OBJECTIVE BOX
Patient is a 78y old  Male who presents with a chief complaint of Fever (21 Jul 2023 16:32)      SUBJECTIVE / OVERNIGHT EVENTS:    Patient seen and examined. no complaints. feels skin improving. no reported blding. feels legs slightly improved.      Vital Signs Last 24 Hrs  T(C): 36.7 (22 Jul 2023 09:47), Max: 37.4 (21 Jul 2023 23:41)  T(F): 98 (22 Jul 2023 09:47), Max: 99.3 (21 Jul 2023 23:41)  HR: 94 (22 Jul 2023 09:47) (87 - 107)  BP: 105/57 (22 Jul 2023 09:47) (103/66 - 121/69)  BP(mean): --  RR: 17 (22 Jul 2023 09:47) (17 - 18)  SpO2: 93% (22 Jul 2023 09:47) (91% - 95%)    Parameters below as of 22 Jul 2023 09:47  Patient On (Oxygen Delivery Method): room air      I&O's Summary      PE:  GENERAL: NAD, AAOx3  CHEST/LUNG: CTABL, No wheeze  HEART: Regular rate and rhythm; + murmur  ABDOMEN: Soft, Nontender, Nondistended; Bowel sounds present  EXTREMITIES:  2+ Peripheral Pulses, +2 le edema  SKIN: erythematous over body  NEURO: No focal deficits    LABS:                        11.4   14.36 )-----------( 13       ( 22 Jul 2023 05:49 )             35.0     07-21    140  |  103  |  23  ----------------------------<  154<H>  4.2   |  24  |  1.13    Ca    9.1      21 Jul 2023 19:11    TPro  5.9<L>  /  Alb  3.3  /  TBili  0.2  /  DBili  <0.1  /  AST  19  /  ALT  37  /  AlkPhos  73  07-21      CAPILLARY BLOOD GLUCOSE            Urinalysis Basic - ( 21 Jul 2023 19:11 )    Color: x / Appearance: x / SG: x / pH: x  Gluc: 154 mg/dL / Ketone: x  / Bili: x / Urobili: x   Blood: x / Protein: x / Nitrite: x   Leuk Esterase: x / RBC: x / WBC x   Sq Epi: x / Non Sq Epi: x / Bacteria: x        RADIOLOGY & ADDITIONAL TESTS:    Imaging Personally Reviewed:  [x] YES  [ ] NO    Consultant(s) Notes Reviewed:  [x] YES  [ ] NO    MEDICATIONS  (STANDING):  chlorhexidine 2% Cloths 1 Application(s) Topical daily  diphenhydrAMINE 25 milliGRAM(s) Oral at bedtime  hydrOXYzine hydrochloride 25 milliGRAM(s) Oral daily  predniSONE   Tablet 15 milliGRAM(s) Oral daily  simvastatin 40 milliGRAM(s) Oral at bedtime  tamsulosin 0.8 milliGRAM(s) Oral at bedtime  valACYclovir 500 milliGRAM(s) Oral daily    MEDICATIONS  (PRN):  acetaminophen     Tablet .. 650 milliGRAM(s) Oral every 6 hours PRN Temp greater or equal to 38C (100.4F), Mild Pain (1 - 3)  aluminum hydroxide/magnesium hydroxide/simethicone Suspension 30 milliLiter(s) Oral every 4 hours PRN Dyspepsia  melatonin 5 milliGRAM(s) Oral at bedtime PRN Insomnia  ondansetron Injectable 4 milliGRAM(s) IV Push every 8 hours PRN Nausea and/or Vomiting      Care Discussed with Consultants/Other Providers [x] YES  [ ] NO    HEALTH ISSUES - PROBLEM Dx:  Hearing loss

## 2023-07-22 NOTE — PROGRESS NOTE ADULT - SUBJECTIVE AND OBJECTIVE BOX
Date of Service: 07-22-23 @ 12:51    Patient is a 78y old  Male who presents with a chief complaint of Fever (22 Jul 2023 09:50)      Any change in ROS: She seems OK: no sob:     MEDICATIONS  (STANDING):  chlorhexidine 2% Cloths 1 Application(s) Topical daily  diphenhydrAMINE 25 milliGRAM(s) Oral at bedtime  hydrOXYzine hydrochloride 25 milliGRAM(s) Oral daily  predniSONE   Tablet 15 milliGRAM(s) Oral daily  simvastatin 40 milliGRAM(s) Oral at bedtime  tamsulosin 0.8 milliGRAM(s) Oral at bedtime  valACYclovir 500 milliGRAM(s) Oral daily    MEDICATIONS  (PRN):  acetaminophen     Tablet .. 650 milliGRAM(s) Oral every 6 hours PRN Temp greater or equal to 38C (100.4F), Mild Pain (1 - 3)  aluminum hydroxide/magnesium hydroxide/simethicone Suspension 30 milliLiter(s) Oral every 4 hours PRN Dyspepsia  melatonin 5 milliGRAM(s) Oral at bedtime PRN Insomnia  ondansetron Injectable 4 milliGRAM(s) IV Push every 8 hours PRN Nausea and/or Vomiting    Vital Signs Last 24 Hrs  T(C): 36.6 (22 Jul 2023 10:58), Max: 37.4 (21 Jul 2023 23:41)  T(F): 97.9 (22 Jul 2023 10:58), Max: 99.3 (21 Jul 2023 23:41)  HR: 90 (22 Jul 2023 10:58) (87 - 107)  BP: 132/69 (22 Jul 2023 10:58) (103/66 - 132/69)  BP(mean): --  RR: 18 (22 Jul 2023 10:58) (17 - 18)  SpO2: 92% (22 Jul 2023 10:58) (91% - 95%)    Parameters below as of 22 Jul 2023 10:58  Patient On (Oxygen Delivery Method): room air        I&O's Summary    22 Jul 2023 07:01  -  22 Jul 2023 12:51  --------------------------------------------------------  IN: 1200 mL / OUT: 360 mL / NET: 840 mL          Physical Exam:   GENERAL: NAD, well-groomed, well-developed  HEENT: JONY/   Atraumatic, Normocephalic  ENMT: No tonsillar erythema, exudates, or enlargement; Moist mucous membranes, Good dentition, No lesions  NECK: Supple, No JVD, Normal thyroid  CHEST/LUNG: Clear to auscultaion  CVS: Regular rate and rhythm; No murmurs, rubs, or gallops  GI: : Soft, Nontender, Nondistended; Bowel sounds present  NERVOUS SYSTEM:  Alert & Oriented X  EXTREMITIES:  2+ Peripheral Pulses, No clubbing, cyanosis, or edema  LYMPH: No lymphadenopathy noted  SKIN: erytheroderma  ENDOCRINOLOGY: No Thyromegaly  PSYCH: Appropriate    Labs:                              11.4   14.36 )-----------( 13       ( 22 Jul 2023 05:49 )             35.0                         12.9   13.93 )-----------( 17       ( 21 Jul 2023 19:11 )             39.4                         11.1   12.79 )-----------( 18       ( 21 Jul 2023 05:57 )             34.4                         10.9   12.29 )-----------( 27       ( 20 Jul 2023 05:51 )             33.2                         11.0   12.24 )-----------( 43       ( 19 Jul 2023 10:00 )             34.3     07-21    140  |  103  |  23  ----------------------------<  154<H>  4.2   |  24  |  1.13  07-21    141  |  106  |  18  ----------------------------<  93  4.0   |  26  |  1.06  07-19    140  |  104  |  17  ----------------------------<  182<H>  4.1   |  25  |  0.98    Ca    9.1      21 Jul 2023 19:11  Ca    9.0      21 Jul 2023 05:57    TPro  5.9<L>  /  Alb  3.3  /  TBili  0.2  /  DBili  <0.1  /  AST  19  /  ALT  37  /  AlkPhos  73  07-21    CAPILLARY BLOOD GLUCOSE          LIVER FUNCTIONS - ( 21 Jul 2023 19:11 )  Alb: 3.3 g/dL / Pro: 5.9 g/dL / ALK PHOS: 73 U/L / ALT: 37 U/L / AST: 19 U/L / GGT: x             Urinalysis Basic - ( 21 Jul 2023 19:11 )    Color: x / Appearance: x / SG: x / pH: x  Gluc: 154 mg/dL / Ketone: x  / Bili: x / Urobili: x   Blood: x / Protein: x / Nitrite: x   Leuk Esterase: x / RBC: x / WBC x   Sq Epi: x / Non Sq Epi: x / Bacteria: x      D-Dimer Assay, Quantitative: 463 ng/mL DDU (07-21 @ 19:11)  rad< from: CT Angio Chest PE Protocol w/ IV Cont (07.17.23 @ 17:39) >    PROCEDURE:  CT Angiogram of the chest was obtained with intravenous contrast. Three   dimensional maximum intensityprojection (MIP) images were generated.    FINDINGS:    PULMONARY ANGIOGRAM: Contrast bolus is satisfactory. No main, right main,   left main, lobar or segmental pulmonary embolism. Limited evaluation of   subsegmental pulmonary arteries secondary tomixing artifact.    LYMPH NODES: Small axillary and mediastinal lymph nodes, for example a   subcarinal lymph node measures 1.4 cm.    HEART/VASCULATURE: The heart is normal in size. No pericardial effusion.   The aorta is normal in caliber.  There are aortic, aortic valve, and   coronary artery calcifications.    AIRWAYS/LUNGS/PLEURA: Linear opacities in the bilateral lower lobes and   to a lesser extent the right middle lobe and lingula, suggestive of   atelectasis. A thin wall air cyst in the superior lingula. No pleural   effusion.    UPPER ABDOMEN: Unremarkable.    BONES/SOFT TISSUES: Degenerative changes.    IMPRESSION:  No pulmonary embolism.    --- End of Report ---           KARLA CODY MD; Resident Radiologist  This document has been electronically signed.  TOM ANDERSON MD; Attending Radiologist  This document has been electronically signed. Jul 18 2023  9:53AM    < end of copied text >        RECENT CULTURES:        RESPIRATORY CULTURES:          Studies  Chest X-RAY  CT SCAN Chest   Venous Dopplers: LE:   CT Abdomen  Others

## 2023-07-22 NOTE — PROGRESS NOTE ADULT - ASSESSMENT
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) and Diffuse Erythroderma (initial rash starting February 2023, worsened with May 2023) currently on prednisone taper with first dose Skyrizi ( RISANKIZUMAB-RZAA-  AN IL-23 inhibitor_  # Fever r/o Sepsis:  # Diffuse Erythroderma:  # HLD:  # Herpes:  # DVT ppx:    # Fever r/o Sepsis:-   -he has non covid coronavirus infection; that could  be th  reason for his fever:   -however his chest xray is mostly clar: which does not explain his hypoxia:   -he also has inability to take deep breaths;   -In addition he is hypoxic with clear chest radiograph and have been on high dose steroids without bactrim prophylaxis  -would do cta and see th elung parenchyma:   -send fungitell and sputum for pcp and LDH   -cont to use o2 to keep his sao2 above 90% all the time:   do dopplers as he has significant swelling of his legs  -dw ID   -his ct scan has no pe:  dopplers are negative for pe   and he has linear atelectasis on both sides:  today he is able to do  incentive spirometry upto 3000cc:  on room air : on ambulation yesterday he did not desaturate  -he does desaturate while sleeping : need to monitor his overnight oximetry  :  no pcp suggesting on ct chest  : fungitell slightly high : but is till Dont think it is of very important nature in this clinical condition:  however ID is floowing any way    his oxygenation has improved and did not need oxygen last night   -however today he says he used oxygen  in the night for sleeping:   daytine he is doing pretty good:   # Diffuse Erythroderma:  -he has been on high dosages of steroids which are gradually being taperd:   # HLD:  -on simvastatin  # Herpes:  -on valtrex  # DVT ppx:  -off lovenox  Thrombocytopenia:   -off lovenox:   -plts dropped further today  too : now < 20K: defer to [facility] :  -plt decreased furterh today  ; transfuse plts prn   - hem onc following:   -no bleeding:   - US spleen noted:  normal spleen   -now atovaquone is also dced by [facility]mon[facility]: no bactrim for now:     dw id and ACP: and wife

## 2023-07-23 LAB
ALBUMIN SERPL ELPH-MCNC: 3 G/DL — LOW (ref 3.3–5)
ALP SERPL-CCNC: 59 U/L — SIGNIFICANT CHANGE UP (ref 40–120)
ALT FLD-CCNC: 26 U/L — SIGNIFICANT CHANGE UP (ref 10–45)
ANION GAP SERPL CALC-SCNC: 13 MMOL/L — SIGNIFICANT CHANGE UP (ref 5–17)
AST SERPL-CCNC: 17 U/L — SIGNIFICANT CHANGE UP (ref 10–40)
BASOPHILS # BLD AUTO: 0.27 K/UL — HIGH (ref 0–0.2)
BASOPHILS NFR BLD AUTO: 1.7 % — SIGNIFICANT CHANGE UP (ref 0–2)
BILIRUB SERPL-MCNC: 0.3 MG/DL — SIGNIFICANT CHANGE UP (ref 0.2–1.2)
BUN SERPL-MCNC: 21 MG/DL — SIGNIFICANT CHANGE UP (ref 7–23)
CALCIUM SERPL-MCNC: 9.2 MG/DL — SIGNIFICANT CHANGE UP (ref 8.4–10.5)
CHLORIDE SERPL-SCNC: 104 MMOL/L — SIGNIFICANT CHANGE UP (ref 96–108)
CO2 SERPL-SCNC: 25 MMOL/L — SIGNIFICANT CHANGE UP (ref 22–31)
CREAT SERPL-MCNC: 1.11 MG/DL — SIGNIFICANT CHANGE UP (ref 0.5–1.3)
EGFR: 68 ML/MIN/1.73M2 — SIGNIFICANT CHANGE UP
EOSINOPHIL # BLD AUTO: 0.41 K/UL — SIGNIFICANT CHANGE UP (ref 0–0.5)
EOSINOPHIL NFR BLD AUTO: 2.6 % — SIGNIFICANT CHANGE UP (ref 0–6)
GLUCOSE SERPL-MCNC: 93 MG/DL — SIGNIFICANT CHANGE UP (ref 70–99)
HCT VFR BLD CALC: 35.3 % — LOW (ref 39–50)
HGB BLD-MCNC: 11.5 G/DL — LOW (ref 13–17)
LYMPHOCYTES # BLD AUTO: 20 % — SIGNIFICANT CHANGE UP (ref 13–44)
LYMPHOCYTES # BLD AUTO: 3.16 K/UL — SIGNIFICANT CHANGE UP (ref 1–3.3)
MANUAL SMEAR VERIFICATION: SIGNIFICANT CHANGE UP
MCHC RBC-ENTMCNC: 30.6 PG — SIGNIFICANT CHANGE UP (ref 27–34)
MCHC RBC-ENTMCNC: 32.6 GM/DL — SIGNIFICANT CHANGE UP (ref 32–36)
MCV RBC AUTO: 93.9 FL — SIGNIFICANT CHANGE UP (ref 80–100)
METAMYELOCYTES # FLD: 1.7 % — HIGH (ref 0–0)
MONOCYTES # BLD AUTO: 0.27 K/UL — SIGNIFICANT CHANGE UP (ref 0–0.9)
MONOCYTES NFR BLD AUTO: 1.7 % — LOW (ref 2–14)
MYELOCYTES NFR BLD: 0.9 % — HIGH (ref 0–0)
NEUTROPHILS # BLD AUTO: 11.15 K/UL — HIGH (ref 1.8–7.4)
NEUTROPHILS NFR BLD AUTO: 69.6 % — SIGNIFICANT CHANGE UP (ref 43–77)
NEUTS BAND # BLD: 0.9 % — SIGNIFICANT CHANGE UP (ref 0–8)
PLAT MORPH BLD: NORMAL — SIGNIFICANT CHANGE UP
PLATELET # BLD AUTO: 13 K/UL — CRITICAL LOW (ref 150–400)
POTASSIUM SERPL-MCNC: 4.1 MMOL/L — SIGNIFICANT CHANGE UP (ref 3.5–5.3)
POTASSIUM SERPL-SCNC: 4.1 MMOL/L — SIGNIFICANT CHANGE UP (ref 3.5–5.3)
PROT SERPL-MCNC: 5.4 G/DL — LOW (ref 6–8.3)
RBC # BLD: 3.76 M/UL — LOW (ref 4.2–5.8)
RBC # FLD: 12.4 % — SIGNIFICANT CHANGE UP (ref 10.3–14.5)
RBC BLD AUTO: SIGNIFICANT CHANGE UP
SODIUM SERPL-SCNC: 142 MMOL/L — SIGNIFICANT CHANGE UP (ref 135–145)
VARIANT LYMPHS # BLD: 0.9 % — SIGNIFICANT CHANGE UP (ref 0–6)
WBC # BLD: 15.81 K/UL — HIGH (ref 3.8–10.5)
WBC # FLD AUTO: 15.81 K/UL — HIGH (ref 3.8–10.5)

## 2023-07-23 RX ADMIN — CHLORHEXIDINE GLUCONATE 1 APPLICATION(S): 213 SOLUTION TOPICAL at 11:36

## 2023-07-23 RX ADMIN — Medication 15 MILLIGRAM(S): at 05:23

## 2023-07-23 RX ADMIN — TAMSULOSIN HYDROCHLORIDE 0.8 MILLIGRAM(S): 0.4 CAPSULE ORAL at 21:32

## 2023-07-23 RX ADMIN — VALACYCLOVIR 500 MILLIGRAM(S): 500 TABLET, FILM COATED ORAL at 11:37

## 2023-07-23 RX ADMIN — Medication 25 MILLIGRAM(S): at 21:32

## 2023-07-23 RX ADMIN — Medication 5 MILLIGRAM(S): at 21:35

## 2023-07-23 RX ADMIN — SIMVASTATIN 40 MILLIGRAM(S): 20 TABLET, FILM COATED ORAL at 21:32

## 2023-07-23 NOTE — PROGRESS NOTE ADULT - ASSESSMENT
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) and Diffuse Erythroderma (initial rash starting February 2023, worsened with May 2023) currently on prednisone taper with first dose Skyrizi ( RISANKIZUMAB-RZAA-  AN IL-23 inhibitor_  # Fever r/o Sepsis:  # Diffuse Erythroderma:  # HLD:  # Herpes:  # DVT ppx:    # Fever r/o Sepsis:-   -he has non covid coronavirus infection; that could  be th  reason for his fever:   -however his chest xray is mostly clar: which does not explain his hypoxia:   -he also has inability to take deep breaths;   -In addition he is hypoxic with clear chest radiograph and have been on high dose steroids without bactrim prophylaxis  -would do cta and see th elung parenchyma:   -send fungitell and sputum for pcp and LDH   -cont to use o2 to keep his sao2 above 90% all the time:   do dopplers as he has significant swelling of his legs  -dw ID   -his ct scan has no pe:  dopplers are negative for pe   and he has linear atelectasis on both sides:  today he is able to do  incentive spirometry upto 3000cc:  on room air : on ambulation yesterday he did not desaturate  -he does desaturate while sleeping : need to monitor his overnight oximetry  :  no pcp suggesting on ct chest  : fungitell slightly high : but is till Dont think it is of very important nature in this clinical condition:  however ID is following any way    his oxygenation has improved and did not need oxygen last night   -however today he says he used oxygen  in the night for sleeping:   -daytine he is doing pretty good:   # Diffuse Erythroderma:  -he has been on high dosages of steroids which are gradually being taperd:   # HLD:  -on simvastatin  # Herpes:  -on valtrex  # DVT ppx:  -off lovenox  Thrombocytopenia:   -off Lovenox   -plts dropped further today  too : now < 20K: defer to [facility] :  -plt decreased further today  ; transfuse plts prn   - hem onc following: ? reason skyrizi  ?? atovaquone:    -no bleeding:   - US spleen noted:  normal spleen   -now atovaquone is also dced by [facility]mon[facility]: no bactrim for now:   -still remains low    dw id and ACP: and wife

## 2023-07-23 NOTE — PROGRESS NOTE ADULT - SUBJECTIVE AND OBJECTIVE BOX
Date of Service: 07-23-23 @ 12:48    Patient is a 78y old  Male who presents with a chief complaint of Fever (23 Jul 2023 08:40)      Any change in ROS: She seems OK: no sob:  no cough:     MEDICATIONS  (STANDING):  chlorhexidine 2% Cloths 1 Application(s) Topical daily  diphenhydrAMINE 25 milliGRAM(s) Oral at bedtime  hydrOXYzine hydrochloride 25 milliGRAM(s) Oral daily  predniSONE   Tablet 15 milliGRAM(s) Oral daily  simvastatin 40 milliGRAM(s) Oral at bedtime  tamsulosin 0.8 milliGRAM(s) Oral at bedtime  valACYclovir 500 milliGRAM(s) Oral daily    MEDICATIONS  (PRN):  acetaminophen     Tablet .. 650 milliGRAM(s) Oral every 6 hours PRN Temp greater or equal to 38C (100.4F), Mild Pain (1 - 3)  aluminum hydroxide/magnesium hydroxide/simethicone Suspension 30 milliLiter(s) Oral every 4 hours PRN Dyspepsia  melatonin 5 milliGRAM(s) Oral at bedtime PRN Insomnia  ondansetron Injectable 4 milliGRAM(s) IV Push every 8 hours PRN Nausea and/or Vomiting    Vital Signs Last 24 Hrs  T(C): 37.1 (23 Jul 2023 11:13), Max: 37.1 (23 Jul 2023 11:13)  T(F): 98.8 (23 Jul 2023 11:13), Max: 98.8 (23 Jul 2023 11:13)  HR: 86 (23 Jul 2023 11:13) (86 - 103)  BP: 119/51 (23 Jul 2023 11:13) (108/60 - 147/75)  BP(mean): --  RR: 18 (23 Jul 2023 11:13) (18 - 18)  SpO2: 94% (23 Jul 2023 11:13) (93% - 94%)    Parameters below as of 23 Jul 2023 11:13  Patient On (Oxygen Delivery Method): room air        I&O's Summary    22 Jul 2023 07:01  -  23 Jul 2023 07:00  --------------------------------------------------------  IN: 1200 mL / OUT: 360 mL / NET: 840 mL          Physical Exam:   GENERAL: NAD, well-groomed, well-developed  HEENT: JONY/   Atraumatic, Normocephalic  ENMT: No tonsillar erythema, exudates, or enlargement; Moist mucous membranes, Good dentition, No lesions  NECK: Supple, No JVD, Normal thyroid  CHEST/LUNG: Clear to auscultaion, ; No rales, rhonchi, wheezing, or rubs  CVS: Regular rate and rhythm; No murmurs, rubs, or gallops  GI: : Soft, Nontender, Nondistended; Bowel sounds present  NERVOUS SYSTEM:  Alert & Oriented X3  EXTREMITIES:  2+ Peripheral Pulses, No clubbing, cyanosis, or edema  LYMPH: No lymphadenopathy noted  SKIN: less red skin  ENDOCRINOLOGY: No Thyromegaly  PSYCH: Appropriate    Labs:                              11.5   15.81 )-----------( 13       ( 23 Jul 2023 06:40 )             35.3                         11.4   14.36 )-----------( 13       ( 22 Jul 2023 05:49 )             35.0                         12.9   13.93 )-----------( 17       ( 21 Jul 2023 19:11 )             39.4                         11.1   12.79 )-----------( 18       ( 21 Jul 2023 05:57 )             34.4                         10.9   12.29 )-----------( 27       ( 20 Jul 2023 05:51 )             33.2     07-23    142  |  104  |  21  ----------------------------<  93  4.1   |  25  |  1.11  07-21    140  |  103  |  23  ----------------------------<  154<H>  4.2   |  24  |  1.13  07-21    141  |  106  |  18  ----------------------------<  93  4.0   |  26  |  1.06    Ca    9.2      23 Jul 2023 06:41  Ca    9.1      21 Jul 2023 19:11    TPro  5.4<L>  /  Alb  3.0<L>  /  TBili  0.3  /  DBili  x   /  AST  17  /  ALT  26  /  AlkPhos  59  07-23  TPro  5.9<L>  /  Alb  3.3  /  TBili  0.2  /  DBili  <0.1  /  AST  19  /  ALT  37  /  AlkPhos  73  07-21    CAPILLARY BLOOD GLUCOSE          LIVER FUNCTIONS - ( 23 Jul 2023 06:41 )  Alb: 3.0 g/dL / Pro: 5.4 g/dL / ALK PHOS: 59 U/L / ALT: 26 U/L / AST: 17 U/L / GGT: x             Urinalysis Basic - ( 23 Jul 2023 06:41 )    Color: x / Appearance: x / SG: x / pH: x  Gluc: 93 mg/dL / Ketone: x  / Bili: x / Urobili: x   Blood: x / Protein: x / Nitrite: x   Leuk Esterase: x / RBC: x / WBC x   Sq Epi: x / Non Sq Epi: x / Bacteria: x      D-Dimer Assay, Quantitative: 463 ng/mL DDU (07-21 @ 19:11)        RECENT CULTURES:      rad< from: US Spleen (07.20.23 @ 13:27) >  TECHNIQUE: Sonography of the spleen    FINDINGS:    Spleen: Normal in appearance measuring 11.1 x 4.6 x 4.5cm with a volume   of 294cm^3      IMPRESSION:    No splenomegaly    --- End of Report---          JOSE ORTIZ MD; Resident Radiologist  This document has been electronically signed.  ANNA SAWYER MD; Attending Radiologist  This document has been electronically signed. Jul 20 2023  3:13PM    < end of copied text >  < from: CT Angio Chest PE Protocol w/ IV Cont (07.17.23 @ 17:39) >  dimensional maximum intensityprojection (MIP) images were generated.    FINDINGS:    PULMONARY ANGIOGRAM: Contrast bolus is satisfactory. No main, right main,   left main, lobar or segmental pulmonary embolism. Limited evaluation of   subsegmental pulmonary arteries secondary tomixing artifact.    LYMPH NODES: Small axillary and mediastinal lymph nodes, for example a   subcarinal lymph node measures 1.4 cm.    HEART/VASCULATURE: The heart is normal in size. No pericardial effusion.   The aorta is normal in caliber.  There are aortic, aortic valve, and   coronary artery calcifications.    AIRWAYS/LUNGS/PLEURA: Linear opacities in the bilateral lower lobes and   to a lesser extent the right middle lobe and lingula, suggestive of   atelectasis. A thin wall air cyst in the superior lingula. No pleural   effusion.    UPPER ABDOMEN: Unremarkable.    BONES/SOFT TISSUES: Degenerative changes.    IMPRESSION:  No pulmonary embolism.    --- End of Report ---           KARLA CODY MD; Resident Radiologist  This document has been electronically signed.    < end of copied text >    RESPIRATORY CULTURES:          Studies  Chest X-RAY  CT SCAN Chest   Venous Dopplers: LE:   CT Abdomen  Others

## 2023-07-23 NOTE — PROGRESS NOTE ADULT - ASSESSMENT
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 Presents to [facility] for worsening fever.    # thrombocyotpenia  - 2/2 skyrizi vs atovaquone vs EBV reinfection?  - cont to monitor, transfuse <10K, <20k if blding, <50k if febrile  - pt refusing CT A/P, spleen US no splenomegaly  - hold atovaquone and bactrim  - appreciate heme recs  - repeat blue top in AM, cont to monitor inpt as dw heme    # Diffuse Erythroderma  # LE edema  - C/w Prednisone per derm, outpt fu  - le doppler neg dvt  - lasix 20mg PRN  - cannot wear compression stockings 2/2 erythroderma and irritation    # Fever, resolved  # Hypoxia, nocurnal, resolved  # Sinus Tachycardia, resolved  - cxr neg ; cta chest negative for PE, likely due to atelectasis, deconditioning   - incentive spirometer, increasing mobilization, PT OOB  - RVP + coronavirus (not COVID)  - BCx/UCx NGTD  - dw ID re elevated fungitell lvl, CT chest no signs of PCP, started on atovaquone for pcp ppx, now with thrombocytopenia so stopped  - outpt fu Dr Le Anthony ID in office     # HLD  - C/w Statin    # Herpes  - C/w Valtrex    DVT ppx: Lovenox held for thrombocytopenia    dw pt and wife    [facility]  [phone]

## 2023-07-23 NOTE — PROGRESS NOTE ADULT - SUBJECTIVE AND OBJECTIVE BOX
Patient is a 78y old  Male who presents with a chief complaint of Fever (22 Jul 2023 12:51)      SUBJECTIVE / OVERNIGHT EVENTS:    Patient seen and examined. feels well. no blding. no compalints.      Vital Signs Last 24 Hrs  T(C): 36.7 (23 Jul 2023 04:44), Max: 36.8 (22 Jul 2023 20:47)  T(F): 98 (23 Jul 2023 04:44), Max: 98.2 (22 Jul 2023 20:47)  HR: 88 (23 Jul 2023 04:44) (88 - 103)  BP: 110/60 (23 Jul 2023 04:44) (105/57 - 147/75)  BP(mean): --  RR: 18 (23 Jul 2023 04:44) (17 - 18)  SpO2: 93% (23 Jul 2023 04:44) (92% - 93%)    Parameters below as of 23 Jul 2023 04:44  Patient On (Oxygen Delivery Method): room air      I&O's Summary    22 Jul 2023 07:01  -  23 Jul 2023 07:00  --------------------------------------------------------  IN: 1200 mL / OUT: 360 mL / NET: 840 mL      PE  GENERAL: NAD, AAOx3  CHEST/LUNG: CTABL, No wheeze  HEART: Regular rate and rhythm; + murmur  ABDOMEN: Soft, Nontender, Nondistended; Bowel sounds present  EXTREMITIES:  2+ Peripheral Pulses, +2 le edema  SKIN: erythematous over body  NEURO: No focal deficits      LABS:                        11.5   15.81 )-----------( 13       ( 23 Jul 2023 06:40 )             35.3     07-23    142  |  104  |  21  ----------------------------<  93  4.1   |  25  |  1.11    Ca    9.2      23 Jul 2023 06:41    TPro  5.4<L>  /  Alb  3.0<L>  /  TBili  0.3  /  DBili  x   /  AST  17  /  ALT  26  /  AlkPhos  59  07-23      CAPILLARY BLOOD GLUCOSE            Urinalysis Basic - ( 23 Jul 2023 06:41 )    Color: x / Appearance: x / SG: x / pH: x  Gluc: 93 mg/dL / Ketone: x  / Bili: x / Urobili: x   Blood: x / Protein: x / Nitrite: x   Leuk Esterase: x / RBC: x / WBC x   Sq Epi: x / Non Sq Epi: x / Bacteria: x        RADIOLOGY & ADDITIONAL TESTS:    Imaging Personally Reviewed:  [x] YES  [ ] NO    Consultant(s) Notes Reviewed:  [x] YES  [ ] NO    MEDICATIONS  (STANDING):  chlorhexidine 2% Cloths 1 Application(s) Topical daily  diphenhydrAMINE 25 milliGRAM(s) Oral at bedtime  hydrOXYzine hydrochloride 25 milliGRAM(s) Oral daily  predniSONE   Tablet 15 milliGRAM(s) Oral daily  simvastatin 40 milliGRAM(s) Oral at bedtime  tamsulosin 0.8 milliGRAM(s) Oral at bedtime  valACYclovir 500 milliGRAM(s) Oral daily    MEDICATIONS  (PRN):  acetaminophen     Tablet .. 650 milliGRAM(s) Oral every 6 hours PRN Temp greater or equal to 38C (100.4F), Mild Pain (1 - 3)  aluminum hydroxide/magnesium hydroxide/simethicone Suspension 30 milliLiter(s) Oral every 4 hours PRN Dyspepsia  melatonin 5 milliGRAM(s) Oral at bedtime PRN Insomnia  ondansetron Injectable 4 milliGRAM(s) IV Push every 8 hours PRN Nausea and/or Vomiting      Care Discussed with Consultants/Other Providers [x] YES  [ ] NO    HEALTH ISSUES - PROBLEM Dx:  Hearing loss

## 2023-07-24 ENCOUNTER — TRANSCRIPTION ENCOUNTER (OUTPATIENT)
Age: 78
End: 2023-07-24

## 2023-07-24 VITALS
HEART RATE: 59 BPM | RESPIRATION RATE: 18 BRPM | DIASTOLIC BLOOD PRESSURE: 77 MMHG | SYSTOLIC BLOOD PRESSURE: 129 MMHG | OXYGEN SATURATION: 98 % | TEMPERATURE: 98 F

## 2023-07-24 LAB
ANION GAP SERPL CALC-SCNC: 13 MMOL/L — SIGNIFICANT CHANGE UP (ref 5–17)
B19V DNA FLD QL NAA+PROBE: SIGNIFICANT CHANGE UP IU/ML
BASOPHILS # BLD AUTO: 0.14 K/UL — SIGNIFICANT CHANGE UP (ref 0–0.2)
BASOPHILS NFR BLD AUTO: 0.9 % — SIGNIFICANT CHANGE UP (ref 0–2)
BUN SERPL-MCNC: 20 MG/DL — SIGNIFICANT CHANGE UP (ref 7–23)
CALCIUM SERPL-MCNC: 9 MG/DL — SIGNIFICANT CHANGE UP (ref 8.4–10.5)
CHLORIDE SERPL-SCNC: 105 MMOL/L — SIGNIFICANT CHANGE UP (ref 96–108)
CLOSURE TME COLL+EPINEP BLD: 15 K/UL — CRITICAL LOW (ref 150–400)
CLOSURE TME COLL+EPINEP BLD: 23 K/UL — LOW (ref 150–400)
CMV DNA CSF QL NAA+PROBE: SIGNIFICANT CHANGE UP IU/ML
CMV DNA SPEC NAA+PROBE-LOG#: SIGNIFICANT CHANGE UP LOG10IU/ML
CO2 SERPL-SCNC: 25 MMOL/L — SIGNIFICANT CHANGE UP (ref 22–31)
CREAT SERPL-MCNC: 0.95 MG/DL — SIGNIFICANT CHANGE UP (ref 0.5–1.3)
EGFR: 82 ML/MIN/1.73M2 — SIGNIFICANT CHANGE UP
EOSINOPHIL # BLD AUTO: 0.41 K/UL — SIGNIFICANT CHANGE UP (ref 0–0.5)
EOSINOPHIL NFR BLD AUTO: 2.6 % — SIGNIFICANT CHANGE UP (ref 0–6)
FERRITIN SERPL-MCNC: 223 NG/ML — SIGNIFICANT CHANGE UP (ref 30–400)
GLUCOSE SERPL-MCNC: 99 MG/DL — SIGNIFICANT CHANGE UP (ref 70–99)
HCT VFR BLD CALC: 36 % — LOW (ref 39–50)
HGB BLD-MCNC: 11.7 G/DL — LOW (ref 13–17)
LYMPHOCYTES # BLD AUTO: 1.97 K/UL — SIGNIFICANT CHANGE UP (ref 1–3.3)
LYMPHOCYTES # BLD AUTO: 12.4 % — LOW (ref 13–44)
MANUAL SMEAR VERIFICATION: SIGNIFICANT CHANGE UP
MCHC RBC-ENTMCNC: 30.8 PG — SIGNIFICANT CHANGE UP (ref 27–34)
MCHC RBC-ENTMCNC: 32.5 GM/DL — SIGNIFICANT CHANGE UP (ref 32–36)
MCV RBC AUTO: 94.7 FL — SIGNIFICANT CHANGE UP (ref 80–100)
METAMYELOCYTES # FLD: 0.9 % — HIGH (ref 0–0)
MONOCYTES # BLD AUTO: 0.99 K/UL — HIGH (ref 0–0.9)
MONOCYTES NFR BLD AUTO: 6.2 % — SIGNIFICANT CHANGE UP (ref 2–14)
MYELOCYTES NFR BLD: 2.7 % — HIGH (ref 0–0)
NEUTROPHILS # BLD AUTO: 11.82 K/UL — HIGH (ref 1.8–7.4)
NEUTROPHILS NFR BLD AUTO: 74.3 % — SIGNIFICANT CHANGE UP (ref 43–77)
PLAT MORPH BLD: NORMAL — SIGNIFICANT CHANGE UP
PLATELET # BLD AUTO: 16 K/UL — CRITICAL LOW (ref 150–400)
POTASSIUM SERPL-MCNC: 4 MMOL/L — SIGNIFICANT CHANGE UP (ref 3.5–5.3)
POTASSIUM SERPL-SCNC: 4 MMOL/L — SIGNIFICANT CHANGE UP (ref 3.5–5.3)
RBC # BLD: 3.8 M/UL — LOW (ref 4.2–5.8)
RBC # FLD: 12.9 % — SIGNIFICANT CHANGE UP (ref 10.3–14.5)
RBC BLD AUTO: SIGNIFICANT CHANGE UP
SODIUM SERPL-SCNC: 143 MMOL/L — SIGNIFICANT CHANGE UP (ref 135–145)
WBC # BLD: 15.91 K/UL — HIGH (ref 3.8–10.5)
WBC # FLD AUTO: 15.91 K/UL — HIGH (ref 3.8–10.5)

## 2023-07-24 PROCEDURE — 81003 URINALYSIS AUTO W/O SCOPE: CPT

## 2023-07-24 PROCEDURE — 87389 HIV-1 AG W/HIV-1&-2 AB AG IA: CPT

## 2023-07-24 PROCEDURE — 93306 TTE W/DOPPLER COMPLETE: CPT

## 2023-07-24 PROCEDURE — 80074 ACUTE HEPATITIS PANEL: CPT

## 2023-07-24 PROCEDURE — 87086 URINE CULTURE/COLONY COUNT: CPT

## 2023-07-24 PROCEDURE — 76705 ECHO EXAM OF ABDOMEN: CPT

## 2023-07-24 PROCEDURE — 86664 EPSTEIN-BARR NUCLEAR ANTIGEN: CPT

## 2023-07-24 PROCEDURE — 85045 AUTOMATED RETICULOCYTE COUNT: CPT

## 2023-07-24 PROCEDURE — 85730 THROMBOPLASTIN TIME PARTIAL: CPT

## 2023-07-24 PROCEDURE — 86665 EPSTEIN-BARR CAPSID VCA: CPT

## 2023-07-24 PROCEDURE — 85014 HEMATOCRIT: CPT

## 2023-07-24 PROCEDURE — 80048 BASIC METABOLIC PNL TOTAL CA: CPT

## 2023-07-24 PROCEDURE — 93005 ELECTROCARDIOGRAM TRACING: CPT

## 2023-07-24 PROCEDURE — 82947 ASSAY GLUCOSE BLOOD QUANT: CPT

## 2023-07-24 PROCEDURE — 82746 ASSAY OF FOLIC ACID SERUM: CPT

## 2023-07-24 PROCEDURE — 87040 BLOOD CULTURE FOR BACTERIA: CPT

## 2023-07-24 PROCEDURE — 71045 X-RAY EXAM CHEST 1 VIEW: CPT

## 2023-07-24 PROCEDURE — 96375 TX/PRO/DX INJ NEW DRUG ADDON: CPT

## 2023-07-24 PROCEDURE — 87449 NOS EACH ORGANISM AG IA: CPT

## 2023-07-24 PROCEDURE — 87641 MR-STAPH DNA AMP PROBE: CPT

## 2023-07-24 PROCEDURE — 71275 CT ANGIOGRAPHY CHEST: CPT

## 2023-07-24 PROCEDURE — 86022 PLATELET ANTIBODIES: CPT

## 2023-07-24 PROCEDURE — 84295 ASSAY OF SERUM SODIUM: CPT

## 2023-07-24 PROCEDURE — 0225U NFCT DS DNA&RNA 21 SARSCOV2: CPT

## 2023-07-24 PROCEDURE — 82435 ASSAY OF BLOOD CHLORIDE: CPT

## 2023-07-24 PROCEDURE — 93970 EXTREMITY STUDY: CPT

## 2023-07-24 PROCEDURE — 97161 PT EVAL LOW COMPLEX 20 MIN: CPT

## 2023-07-24 PROCEDURE — 83550 IRON BINDING TEST: CPT

## 2023-07-24 PROCEDURE — 84132 ASSAY OF SERUM POTASSIUM: CPT

## 2023-07-24 PROCEDURE — 86880 COOMBS TEST DIRECT: CPT

## 2023-07-24 PROCEDURE — 99232 SBSQ HOSP IP/OBS MODERATE 35: CPT

## 2023-07-24 PROCEDURE — 80053 COMPREHEN METABOLIC PANEL: CPT

## 2023-07-24 PROCEDURE — 85018 HEMOGLOBIN: CPT

## 2023-07-24 PROCEDURE — 86803 HEPATITIS C AB TEST: CPT

## 2023-07-24 PROCEDURE — 85610 PROTHROMBIN TIME: CPT

## 2023-07-24 PROCEDURE — 82728 ASSAY OF FERRITIN: CPT

## 2023-07-24 PROCEDURE — 96374 THER/PROPH/DIAG INJ IV PUSH: CPT

## 2023-07-24 PROCEDURE — 82803 BLOOD GASES ANY COMBINATION: CPT

## 2023-07-24 PROCEDURE — 36415 COLL VENOUS BLD VENIPUNCTURE: CPT

## 2023-07-24 PROCEDURE — 99285 EMERGENCY DEPT VISIT HI MDM: CPT

## 2023-07-24 PROCEDURE — 83605 ASSAY OF LACTIC ACID: CPT

## 2023-07-24 PROCEDURE — 82248 BILIRUBIN DIRECT: CPT

## 2023-07-24 PROCEDURE — 83615 LACTATE (LD) (LDH) ENZYME: CPT

## 2023-07-24 PROCEDURE — 87640 STAPH A DNA AMP PROBE: CPT

## 2023-07-24 PROCEDURE — 83540 ASSAY OF IRON: CPT

## 2023-07-24 PROCEDURE — 86900 BLOOD TYPING SEROLOGIC ABO: CPT

## 2023-07-24 PROCEDURE — 82607 VITAMIN B-12: CPT

## 2023-07-24 PROCEDURE — 87799 DETECT AGENT NOS DNA QUANT: CPT

## 2023-07-24 PROCEDURE — 85049 AUTOMATED PLATELET COUNT: CPT

## 2023-07-24 PROCEDURE — 85027 COMPLETE CBC AUTOMATED: CPT

## 2023-07-24 PROCEDURE — 86850 RBC ANTIBODY SCREEN: CPT

## 2023-07-24 PROCEDURE — 85025 COMPLETE CBC W/AUTO DIFF WBC: CPT

## 2023-07-24 PROCEDURE — 84484 ASSAY OF TROPONIN QUANT: CPT

## 2023-07-24 PROCEDURE — 83010 ASSAY OF HAPTOGLOBIN QUANT: CPT

## 2023-07-24 PROCEDURE — 85384 FIBRINOGEN ACTIVITY: CPT

## 2023-07-24 PROCEDURE — 82330 ASSAY OF CALCIUM: CPT

## 2023-07-24 PROCEDURE — 83880 ASSAY OF NATRIURETIC PEPTIDE: CPT

## 2023-07-24 PROCEDURE — 85379 FIBRIN DEGRADATION QUANT: CPT

## 2023-07-24 PROCEDURE — 86901 BLOOD TYPING SEROLOGIC RH(D): CPT

## 2023-07-24 PROCEDURE — 86663 EPSTEIN-BARR ANTIBODY: CPT

## 2023-07-24 RX ORDER — FUROSEMIDE 40 MG
1 TABLET ORAL
Refills: 0 | DISCHARGE

## 2023-07-24 RX ORDER — CALCIUM CARBONATE 500(1250)
1 TABLET ORAL DAILY
Refills: 0 | Status: DISCONTINUED | OUTPATIENT
Start: 2023-07-24 | End: 2023-07-24

## 2023-07-24 RX ORDER — CHOLECALCIFEROL (VITAMIN D3) 125 MCG
1000 CAPSULE ORAL DAILY
Refills: 0 | Status: DISCONTINUED | OUTPATIENT
Start: 2023-07-24 | End: 2023-07-24

## 2023-07-24 RX ORDER — POTASSIUM CHLORIDE 20 MEQ
1 PACKET (EA) ORAL
Refills: 0 | DISCHARGE

## 2023-07-24 RX ADMIN — VALACYCLOVIR 500 MILLIGRAM(S): 500 TABLET, FILM COATED ORAL at 12:09

## 2023-07-24 RX ADMIN — Medication 10 MILLIGRAM(S): at 05:01

## 2023-07-24 NOTE — PROVIDER CONTACT NOTE (CRITICAL VALUE NOTIFICATION) - ASSESSMENT
patient has no active signs or symptoms of bleeding. Patient in NAD.
No signs of bleeding, VSS
Pt is alert and oriented x4, pt has no signs of active bleeding
Platelets 17

## 2023-07-24 NOTE — DISCHARGE NOTE NURSING/CASE MANAGEMENT/SOCIAL WORK - NSDCPEFALRISK_GEN_ALL_CORE
For information on Fall & Injury Prevention, visit: [email or web address] OR  [email or web address] OR  https://www.cdc.gov/steadi/patient.html

## 2023-07-24 NOTE — PROVIDER CONTACT NOTE (CRITICAL VALUE NOTIFICATION) - ACTION/TREATMENT ORDERED:
PA informed
ACP aware, continue to monitor
Provider notified and made aware. Awaiting for further orders.
PA aware.

## 2023-07-24 NOTE — PROVIDER CONTACT NOTE (CRITICAL VALUE NOTIFICATION) - BACKGROUND
78 yr old male Presents to the emergency department complaining of fever
Med refilled. Please remind pt that she is to schedule an apt with me, if she is going to see me, in another 3 months  As recommended by Dr. Luciano. Let her know I am filling up fast so she needs to make apt.  Thank you  
Refill request:    Levothyroxine 0.137mg tab  Take 1 tablet by mouth every orning on an emptystomach  Qty 90  
adm dx sepsis
lvm for patient  
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

## 2023-07-24 NOTE — PROGRESS NOTE ADULT - REASON FOR ADMISSION
Fever

## 2023-07-24 NOTE — PROGRESS NOTE ADULT - ASSESSMENT
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 Presents to [facility] for worsening fever.    # thrombocyotpenia  - 2/2 skyrizi vs atovaquone vs drug mediated?  - cont to monitor, transfuse <10K, <20k if blding, <50k if febrile  - pt refusing CT A/P, spleen US no splenomegaly  - hold atovaquone and bactrim  - repeat blue top in afternoon, fu heme recs  - stable    # Diffuse Erythroderma  # LE edema  - C/w Prednisone per derm, outpt fu  - le doppler neg dvt  - lasix 20mg PRN  - cannot wear compression stockings 2/2 erythroderma and irritation    # Fever, resolved  # Hypoxia, nocurnal, resolved  # Sinus Tachycardia, resolved  - cxr neg ; cta chest negative for PE, likely due to atelectasis, deconditioning   - incentive spirometer, increasing mobilization, PT OOB  - RVP + coronavirus (not COVID)  - BCx/UCx NGTD  - dw ID re elevated fungitell lvl, CT chest no signs of PCP, started on atovaquone for pcp ppx, now with thrombocytopenia so stopped  - outpt fu Dr Le Anthony ID in office     # HLD  - C/w Statin    # Herpes  - C/w Valtrex    DVT ppx: Lovenox held for thrombocytopenia    dw pt and wife    [facility]  [phone]

## 2023-07-24 NOTE — PROGRESS NOTE ADULT - PROVIDER SPECIALTY LIST ADULT
Infectious Disease
Infectious Disease
Internal Medicine
Infectious Disease
Internal Medicine
Pulmonology
Pulmonology
Heme/Onc
Infectious Disease
Internal Medicine
Pulmonology
Pulmonology
Dermatology
Heme/Onc
Heme/Onc
Infectious Disease
Infectious Disease
Internal Medicine
Pulmonology
Pulmonology

## 2023-07-24 NOTE — PROGRESS NOTE ADULT - SUBJECTIVE AND OBJECTIVE BOX
Patient is a 78y old  Male who presents with a chief complaint of Fever (24 Jul 2023 08:24)      SUBJECTIVE / OVERNIGHT EVENTS:    Patient seen and examined. no cp sob. no blding.      Vital Signs Last 24 Hrs  T(C): 36.7 (24 Jul 2023 11:06), Max: 36.8 (23 Jul 2023 20:48)  T(F): 98.1 (24 Jul 2023 11:06), Max: 98.3 (23 Jul 2023 20:48)  HR: 96 (24 Jul 2023 11:06) (88 - 110)  BP: 132/67 (24 Jul 2023 11:06) (118/68 - 133/74)  BP(mean): --  RR: 18 (24 Jul 2023 11:06) (16 - 18)  SpO2: 92% (24 Jul 2023 11:06) (88% - 96%)    Parameters below as of 24 Jul 2023 11:06  Patient On (Oxygen Delivery Method): room air      I&O's Summary      PE:  GENERAL: NAD, AAOx3  CHEST/LUNG: CTABL, No wheeze  HEART: Regular rate and rhythm; + murmur  ABDOMEN: Soft, Nontender, Nondistended; Bowel sounds present  EXTREMITIES:  2+ Peripheral Pulses, +2 le edema  SKIN: erythematous over body  NEURO: No focal deficits    LABS:                        11.7   15.91 )-----------( 16       ( 24 Jul 2023 06:39 )             36.0     07-24    143  |  105  |  20  ----------------------------<  99  4.0   |  25  |  0.95    Ca    9.0      24 Jul 2023 06:39    TPro  5.4<L>  /  Alb  3.0<L>  /  TBili  0.3  /  DBili  x   /  AST  17  /  ALT  26  /  AlkPhos  59  07-23      CAPILLARY BLOOD GLUCOSE            Urinalysis Basic - ( 24 Jul 2023 06:39 )    Color: x / Appearance: x / SG: x / pH: x  Gluc: 99 mg/dL / Ketone: x  / Bili: x / Urobili: x   Blood: x / Protein: x / Nitrite: x   Leuk Esterase: x / RBC: x / WBC x   Sq Epi: x / Non Sq Epi: x / Bacteria: x        RADIOLOGY & ADDITIONAL TESTS:    Imaging Personally Reviewed:  [x] YES  [ ] NO    Consultant(s) Notes Reviewed:  [x] YES  [ ] NO    MEDICATIONS  (STANDING):  chlorhexidine 2% Cloths 1 Application(s) Topical daily  diphenhydrAMINE 25 milliGRAM(s) Oral at bedtime  hydrOXYzine hydrochloride 25 milliGRAM(s) Oral daily  simvastatin 40 milliGRAM(s) Oral at bedtime  tamsulosin 0.8 milliGRAM(s) Oral at bedtime  valACYclovir 500 milliGRAM(s) Oral daily    MEDICATIONS  (PRN):  acetaminophen     Tablet .. 650 milliGRAM(s) Oral every 6 hours PRN Temp greater or equal to 38C (100.4F), Mild Pain (1 - 3)  aluminum hydroxide/magnesium hydroxide/simethicone Suspension 30 milliLiter(s) Oral every 4 hours PRN Dyspepsia  melatonin 5 milliGRAM(s) Oral at bedtime PRN Insomnia  ondansetron Injectable 4 milliGRAM(s) IV Push every 8 hours PRN Nausea and/or Vomiting      Care Discussed with Consultants/Other Providers [x] YES  [ ] NO    HEALTH ISSUES - PROBLEM Dx:  Hearing loss

## 2023-07-24 NOTE — PROGRESS NOTE ADULT - SUBJECTIVE AND OBJECTIVE BOX
****************************************  Lesley Storey PGY6  Hematology/Oncology  [phone]  ****************************************  SUBJECTIVE  Patient seen and examined at bedside. No acute events overnight  Denied HA, CP, SOB, n/v/d/c , fevers, chills    OBJECTIVE   Vital Signs Last 24 Hrs  T(C): 36.7 (24 Jul 2023 11:06), Max: 36.8 (23 Jul 2023 20:48)  T(F): 98.1 (24 Jul 2023 11:06), Max: 98.3 (23 Jul 2023 20:48)  HR: 96 (24 Jul 2023 11:06) (88 - 110)  BP: 132/67 (24 Jul 2023 11:06) (118/68 - 133/74)  BP(mean): --  RR: 18 (24 Jul 2023 11:06) (16 - 18)  SpO2: 92% (24 Jul 2023 11:06) (88% - 96%)    Parameters below as of 24 Jul 2023 11:06  Patient On (Oxygen Delivery Method): room air        PHYSICAL EXAM:  General: adult in NAD  HEENT: clear oropharynx, anicteric sclera, pink conjunctiva  Neck: supple  CV: normal S1/S2 with no murmur rubs or gallops  Lungs: positive air movement b/l ant lungs, clear to auscultation, no wheezes, no rales  Abdomen: soft non-tender non-distended, no hepatosplenomegaly  Ext: no clubbing cyanosis or edema  Skin: no rashes and no petechiae  Neuro: alert and oriented X 4, no focal deficits        HOSPITAL MEDICATION  valACYclovir 500 milliGRAM(s) Oral daily  simvastatin 40 milliGRAM(s) Oral at bedtime  acetaminophen     Tablet .. 650 milliGRAM(s) Oral every 6 hours PRN  diphenhydrAMINE 25 milliGRAM(s) Oral at bedtime  hydrOXYzine hydrochloride 25 milliGRAM(s) Oral daily  melatonin 5 milliGRAM(s) Oral at bedtime PRN  ondansetron Injectable 4 milliGRAM(s) IV Push every 8 hours PRN  aluminum hydroxide/magnesium hydroxide/simethicone Suspension 30 milliLiter(s) Oral every 4 hours PRN  calcium carbonate    500 mG (Tums) Chewable 1 Tablet(s) Chew daily  tamsulosin 0.8 milliGRAM(s) Oral at bedtime  cholecalciferol 1000 Unit(s) Oral daily  chlorhexidine 2% Cloths 1 Application(s) Topical daily        LABS                        11.7   15.91 )-----------( 16       ( 24 Jul 2023 06:39 )             36.0       07-24    143  |  105  |  20  ----------------------------<  99  4.0   |  25  |  0.95    Ca    9.0      24 Jul 2023 06:39    TPro  5.4<L>  /  Alb  3.0<L>  /  TBili  0.3  /  DBili  x   /  AST  17  /  ALT  26  /  AlkPhos  59  07-23      Urinalysis Basic - ( 24 Jul 2023 06:39 )    Color: x / Appearance: x / SG: x / pH: x  Gluc: 99 mg/dL / Ketone: x  / Bili: x / Urobili: x   Blood: x / Protein: x / Nitrite: x   Leuk Esterase: x / RBC: x / WBC x   Sq Epi: x / Non Sq Epi: x / Bacteria: x        Follow Up:      RADIOLOGY:

## 2023-07-24 NOTE — PROGRESS NOTE ADULT - ASSESSMENT
78-yr-old male with unfounded diagnosis of Cutaneous B-cell lymphoma (patient states he did not have that disease), s/o one session of RT at [facility], lesion disappeared; received Skyrizi few days ago for the diagnosis of Psoriasis. Hematology consulted for thrombocytopenia following Skyrizi.  Platelets dropping to 18 (7/21) from 262 (7/14) at admission. Blue top today confirming platelets 17. PF4 Ab negative. No splenomegaly Patient will need platelet transfusion if any invasive procedure is planned.   Recommend to primary team to stop atovaquone and if needed switch to Bactrim. Atovaquone has been known to cause thrombocytopenia. Will see patient once labs are sent and are resulted.    # Thrombocytopenia   - Would send CBC w/ diff, retic count, iron, ferritin, TIBC, B12, folate, fibrinogen, D-dimer, CMP with bilirubin fractionation, LDH, haptoglobin, direct Dasia, HIV, EBV, CMV, Parvovirus, hepatitis panel, babesia PCR.  - Peripheral smear: many reticulocytes, RBCs without central pallor, tear drop cells, large platelets  - CBC w diff daily   - Transfuse to maintain platelets >10K, >20K if febrile, and >50K for fevers or planned procedures  - Stop atovaquone and monitor CBC w/ diff. Would not recommend PCP ppx with either Mepron nor Bactrim given the risk for thrombocytopenia seen with these drugs.   -Blue top platelet count is 23. Patient can be discharge with outpatient follow up  - Can follow-up with Dr. Harris at [facility] for count checks. Denies recent weight loss and B-symptoms

## 2023-07-24 NOTE — PROGRESS NOTE ADULT - SUBJECTIVE AND OBJECTIVE BOX
[facility] DIVISION OF INFECTIOUS DISEASES  RACHELLE Manzanares Y. Patel, S. Shah, G. Shawn  [phone]  ([phone] - weekdays after 5pm and weekends)    Name: ZAHRA LONG  Age/Gender: 78y Male  MRN: [id number]    Interval History:  Patient seen and examined this morning.   No new complaints   Notes reviewed.   No concerning overnight events.  Afebrile.   Allergies: No Known Allergies      Objective:  Vitals:   T(F): 98.1 (07-24-23 @ 11:06), Max: 98.3 (07-23-23 @ 20:48)  HR: 96 (07-24-23 @ 11:06) (88 - 110)  BP: 132/67 (07-24-23 @ 11:06) (118/68 - 133/74)  RR: 18 (07-24-23 @ 11:06) (16 - 18)  SpO2: 92% (07-24-23 @ 11:06) (88% - 96%)  Physical Examination:  General: no acute distress, on RA  HEENT: NC/AT, anicteric, neck supple  Respiratory: clear to auscultation bilaterally  Cardiovascular: S1 and S2 present, normal rate  Gastrointestinal: soft, nontender, nondistended  Extremities: b/l LE edema, no cyanosis  Skin: dec generalized erythematous rash appears  Psych: appropriate affect and mood for situation  Lines: PIV    Laboratory Studies:  CBC:                       11.7   15.91 )-----------( 16       ( 24 Jul 2023 06:39 )             36.0     WBC Trend:  15.91 07-24-23 @ 06:39  15.81 07-23-23 @ 06:40  14.36 07-22-23 @ 05:49  13.93 07-21-23 @ 19:11  12.79 07-21-23 @ 05:57  12.29 07-20-23 @ 05:51  12.24 07-19-23 @ 10:00  11.80 07-18-23 @ 06:19    CMP: 07-24    143  |  105  |  20  ----------------------------<  99  4.0   |  25  |  0.95    Ca    9.0      24 Jul 2023 06:39    TPro  5.4<L>  /  Alb  3.0<L>  /  TBili  0.3  /  DBili  x   /  AST  17  /  ALT  26  /  AlkPhos  59  07-23    Creatinine: 0.95 mg/dL (07-24-23 @ 06:39)  Creatinine: 1.11 mg/dL (07-23-23 @ 06:41)  Creatinine: 1.13 mg/dL (07-21-23 @ 19:11)  Creatinine: 1.06 mg/dL (07-21-23 @ 05:57)  Creatinine: 0.98 mg/dL (07-19-23 @ 10:00)  Creatinine: 1.09 mg/dL (07-18-23 @ 06:27)    LIVER FUNCTIONS - ( 23 Jul 2023 06:41 )  Alb: 3.0 g/dL / Pro: 5.4 g/dL / ALK PHOS: 59 U/L / ALT: 26 U/L / AST: 17 U/L / GGT: x           Microbiology: reviewed   Culture - Blood (collected 07-14-23 @ 10:00)  Source: .Blood Blood-Peripheral  Final Report (07-19-23 @ 15:07):    No growth at 5 days    Culture - Urine (collected 07-14-23 @ 09:58)  Source: Clean Catch Clean Catch (Midstream)  Final Report (07-15-23 @ 15:28):    No growth    Culture - Blood (collected 07-14-23 @ 09:23)  Source: .Blood Blood-Peripheral  Final Report (07-19-23 @ 15:07):    No growth at 5 days    Radiology: reviewed     Medications:  acetaminophen     Tablet .. 650 milliGRAM(s) Oral every 6 hours PRN  aluminum hydroxide/magnesium hydroxide/simethicone Suspension 30 milliLiter(s) Oral every 4 hours PRN  chlorhexidine 2% Cloths 1 Application(s) Topical daily  diphenhydrAMINE 25 milliGRAM(s) Oral at bedtime  hydrOXYzine hydrochloride 25 milliGRAM(s) Oral daily  melatonin 5 milliGRAM(s) Oral at bedtime PRN  ondansetron Injectable 4 milliGRAM(s) IV Push every 8 hours PRN  simvastatin 40 milliGRAM(s) Oral at bedtime  tamsulosin 0.8 milliGRAM(s) Oral at bedtime  valACYclovir 500 milliGRAM(s) Oral daily    Current Antimicrobials:  valACYclovir 500 milliGRAM(s) Oral daily    Prior/Completed Antimicrobials:  cefepime   IVPB  vancomycin  IVPB.

## 2023-07-24 NOTE — DISCHARGE NOTE NURSING/CASE MANAGEMENT/SOCIAL WORK - PATIENT PORTAL LINK FT
You can access the FollowMyHealth Patient Portal offered by [facility] by registering at the following website: [email or web address]. By joining [employer]’s FollowMyHealth portal, you will also be able to view your health information using other applications (apps) compatible with our system.

## 2023-07-24 NOTE — PROGRESS NOTE ADULT - ASSESSMENT
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     SIRS/FEVER/ERYTHRODERMA  Presentation with pronounced scaling of skin very suggestive of erythrodermic psoriasis and unclear etiology of acute  leading to hospitalization and fever  very curious history of Primary Cutaneous B-cell Lymphoma -refusing CT imaging for work up here  Infection in June 2023 at [facility] for seasonal coronavirus -- now again positive on this admission   June 2022 was SARS-CoV-2 here at [facility] ER  Leukocytosis -- likely reactive d/t steroid for diffuse erythroderma - WBC stable   Fever may be due to recent Skyrizi as this is a humanized MAB, seasonal coronavirus, but low suspicion for bacterial sepsis   - fevers resolved, none since transfer from ED to floor    Hypoxia - may be viral etiology, no PE; patient has been on steroids for ~2 months without PCP ppx   - LDH wnl; afebrile, WBC downtrended/stable, denies having any cough    - Fungitell slightly elevated but CT imaging with no findings c/w PCP and clinically has improved without targeted treatment, low suspicion for PCP pneumonia at this time  Heme/Onc following for thrombocytopenia - may be d/t Skyrizi   - atovaquone held d/t concern for it contributing to thrombocytopenia, patient with possible sulfa allergy so avoiding bactrim altogether     Recommendations:  -continue to observe off antibiotics   -Dermatology following - continuing on prednisone taper  -continue supportive care       Patrick Anderson M.D.  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     SIRS/FEVER/ERYTHRODERMA  Presentation with pronounced scaling of skin very suggestive of erythrodermic psoriasis and unclear etiology of acute  leading to hospitalization and fever  very curious history of Primary Cutaneous B-cell Lymphoma -refusing CT imaging for work up here  Infection in June 2023 at [facility] for seasonal coronavirus -- now again positive on this admission   June 2022 was SARS-CoV-2 here at [facility] ER  Leukocytosis -- likely reactive d/t steroid for diffuse erythroderma - WBC stable   Fever may be due to recent Skyrizi as this is a humanized MAB, seasonal coronavirus, but low suspicion for bacterial sepsis   - fevers resolved, none since transfer from ED to floor    CMV PCR negative, EBV serology c/w recent infection vs reactivation   typically fevers with EBV can occur for weeks, EBV is a rare cause of thrombocytopenia, low suspicion for current infection at this time   Hypoxia - may be viral etiology, no PE; patient has been on steroids for ~2 months without PCP ppx   - LDH wnl; afebrile, WBC downtrended/stable, denies having any cough    - Fungitell slightly elevated but CT imaging with no findings c/w PCP and clinically has improved without targeted treatment, low suspicion for PCP pneumonia at this time  Heme/Onc following for thrombocytopenia - may be d/t Skyrizi   - atovaquone held d/t concern for it contributing to thrombocytopenia, patient with possible sulfa allergy so avoiding bactrim altogether     Recommendations:  -continue to observe off antibiotics   -Dermatology following - continuing on prednisone taper  -continue supportive care     D/w Dr. Harsha Anderson M.D.  [facility], Division of Infectious Diseases  [phone]  After 5pm on weekdays and all day on weekends - please call [phone]

## 2023-07-25 ENCOUNTER — OUTPATIENT (OUTPATIENT)
Dept: OUTPATIENT SERVICES | Facility: HOSPITAL | Age: 78
LOS: 1 days | Discharge: HOME | End: 2023-07-25

## 2023-07-25 ENCOUNTER — NON-APPOINTMENT (OUTPATIENT)
Age: 78
End: 2023-07-25

## 2023-07-25 ENCOUNTER — APPOINTMENT (OUTPATIENT)
Dept: DERMATOLOGY | Facility: CLINIC | Age: 78
End: 2023-07-25
Payer: MEDICARE

## 2023-07-25 DIAGNOSIS — L53.9 ERYTHEMATOUS CONDITION, UNSPECIFIED: ICD-10-CM

## 2023-07-25 DIAGNOSIS — Z79.899 OTHER LONG TERM (CURRENT) DRUG THERAPY: ICD-10-CM

## 2023-07-25 DIAGNOSIS — L40.9 PSORIASIS, UNSPECIFIED: ICD-10-CM

## 2023-07-25 DIAGNOSIS — D69.6 THROMBOCYTOPENIA, UNSPECIFIED: ICD-10-CM

## 2023-07-25 PROCEDURE — 99214 OFFICE O/P EST MOD 30 MIN: CPT

## 2023-07-26 ENCOUNTER — RESULT REVIEW (OUTPATIENT)
Age: 78
End: 2023-07-26

## 2023-07-26 ENCOUNTER — NON-APPOINTMENT (OUTPATIENT)
Age: 78
End: 2023-07-26

## 2023-07-26 ENCOUNTER — APPOINTMENT (OUTPATIENT)
Dept: HEMATOLOGY ONCOLOGY | Facility: CLINIC | Age: 78
End: 2023-07-26
Payer: MEDICARE

## 2023-07-26 VITALS
WEIGHT: 167.99 LBS | DIASTOLIC BLOOD PRESSURE: 72 MMHG | SYSTOLIC BLOOD PRESSURE: 113 MMHG | BODY MASS INDEX: 27 KG/M2 | RESPIRATION RATE: 16 BRPM | HEART RATE: 84 BPM | TEMPERATURE: 97.9 F | OXYGEN SATURATION: 96 % | HEIGHT: 65.98 IN

## 2023-07-26 LAB
BASOPHILS # BLD AUTO: 0 K/UL — SIGNIFICANT CHANGE UP (ref 0–0.2)
BASOPHILS NFR BLD AUTO: 0 % — SIGNIFICANT CHANGE UP (ref 0–2)
EOSINOPHIL # BLD AUTO: 0 K/UL — SIGNIFICANT CHANGE UP (ref 0–0.5)
EOSINOPHIL NFR BLD AUTO: 0 % — SIGNIFICANT CHANGE UP (ref 0–6)
HCT VFR BLD CALC: 35.6 % — LOW (ref 39–50)
HGB BLD-MCNC: 11.6 G/DL — LOW (ref 13–17)
LYMPHOCYTES # BLD AUTO: 1.56 K/UL — SIGNIFICANT CHANGE UP (ref 1–3.3)
LYMPHOCYTES # BLD AUTO: 9 % — LOW (ref 13–44)
MCHC RBC-ENTMCNC: 30.9 PG — SIGNIFICANT CHANGE UP (ref 27–34)
MCHC RBC-ENTMCNC: 32.6 G/DL — SIGNIFICANT CHANGE UP (ref 32–36)
MCV RBC AUTO: 94.7 FL — SIGNIFICANT CHANGE UP (ref 80–100)
MONOCYTES # BLD AUTO: 0.87 K/UL — SIGNIFICANT CHANGE UP (ref 0–0.9)
MONOCYTES NFR BLD AUTO: 5 % — SIGNIFICANT CHANGE UP (ref 2–14)
MYELOCYTES NFR BLD: 6 % — HIGH (ref 0–0)
NEUTROPHILS # BLD AUTO: 13.84 K/UL — HIGH (ref 1.8–7.4)
NEUTROPHILS NFR BLD AUTO: 80 % — HIGH (ref 43–77)
NRBC # BLD: 0 /100 — SIGNIFICANT CHANGE UP (ref 0–0)
NRBC # BLD: SIGNIFICANT CHANGE UP /100 WBCS (ref 0–0)
PLAT MORPH BLD: NORMAL — SIGNIFICANT CHANGE UP
PLATELET # BLD AUTO: 43 K/UL — LOW (ref 150–400)
RBC # BLD: 3.76 M/UL — LOW (ref 4.2–5.8)
RBC # FLD: 13.2 % — SIGNIFICANT CHANGE UP (ref 10.3–14.5)
RBC BLD AUTO: SIGNIFICANT CHANGE UP
UNFRACTIONATED HEPARIN INTERPRETATION: SIGNIFICANT CHANGE UP
UNFRACTIONATED HEPARIN RESULT: NEGATIVE — SIGNIFICANT CHANGE UP
UNFRACTIONATED HEPARIN-HIGH DOSE: 0 % — SIGNIFICANT CHANGE UP
UNFRACTIONATED HEPARIN-LOW DOSE: 0 % — SIGNIFICANT CHANGE UP
WBC # BLD: 17.3 K/UL — HIGH (ref 3.8–10.5)
WBC # FLD AUTO: 17.3 K/UL — HIGH (ref 3.8–10.5)

## 2023-07-26 PROCEDURE — 99214 OFFICE O/P EST MOD 30 MIN: CPT

## 2023-07-28 ENCOUNTER — RESULT REVIEW (OUTPATIENT)
Age: 78
End: 2023-07-28

## 2023-07-28 ENCOUNTER — APPOINTMENT (OUTPATIENT)
Dept: HEMATOLOGY ONCOLOGY | Facility: CLINIC | Age: 78
End: 2023-07-28

## 2023-07-28 LAB
BASOPHILS # BLD AUTO: 0 K/UL — SIGNIFICANT CHANGE UP (ref 0–0.2)
BASOPHILS NFR BLD AUTO: 0 % — SIGNIFICANT CHANGE UP (ref 0–2)
EOSINOPHIL # BLD AUTO: 0.3 K/UL — SIGNIFICANT CHANGE UP (ref 0–0.5)
EOSINOPHIL NFR BLD AUTO: 2 % — SIGNIFICANT CHANGE UP (ref 0–6)
HCT VFR BLD CALC: 37.2 % — LOW (ref 39–50)
HGB BLD-MCNC: 12.2 G/DL — LOW (ref 13–17)
LYMPHOCYTES # BLD AUTO: 1.96 K/UL — SIGNIFICANT CHANGE UP (ref 1–3.3)
LYMPHOCYTES # BLD AUTO: 13 % — SIGNIFICANT CHANGE UP (ref 13–44)
MCHC RBC-ENTMCNC: 30.9 PG — SIGNIFICANT CHANGE UP (ref 27–34)
MCHC RBC-ENTMCNC: 32.8 G/DL — SIGNIFICANT CHANGE UP (ref 32–36)
MCV RBC AUTO: 94.2 FL — SIGNIFICANT CHANGE UP (ref 80–100)
MONOCYTES # BLD AUTO: 0.75 K/UL — SIGNIFICANT CHANGE UP (ref 0–0.9)
MONOCYTES NFR BLD AUTO: 5 % — SIGNIFICANT CHANGE UP (ref 2–14)
MYELOCYTES NFR BLD: 5 % — HIGH (ref 0–0)
NEUTROPHILS # BLD AUTO: 11.29 K/UL — HIGH (ref 1.8–7.4)
NEUTROPHILS NFR BLD AUTO: 75 % — SIGNIFICANT CHANGE UP (ref 43–77)
NRBC # BLD: 0 /100 — SIGNIFICANT CHANGE UP (ref 0–0)
NRBC # BLD: SIGNIFICANT CHANGE UP /100 WBCS (ref 0–0)
PLAT MORPH BLD: NORMAL — SIGNIFICANT CHANGE UP
PLATELET # BLD AUTO: 86 K/UL — LOW (ref 150–400)
PLATELET # BLD MANUAL: 84 K/UL — LOW (ref 150–400)
RBC # BLD: 3.95 M/UL — LOW (ref 4.2–5.8)
RBC # FLD: 13.7 % — SIGNIFICANT CHANGE UP (ref 10.3–14.5)
RBC BLD AUTO: SIGNIFICANT CHANGE UP
WBC # BLD: 15.05 K/UL — HIGH (ref 3.8–10.5)
WBC # FLD AUTO: 15.05 K/UL — HIGH (ref 3.8–10.5)

## 2023-07-28 NOTE — ADDENDUM
[FreeTextEntry1] : 7/28/2023\par Discussed repeat CBC with the pt from today - showing improvement to 84k \par Okay to proceed with plaque removal of nose as platelet is  > 50k\par Reevaluate CBC next week then every two weeks if platelet counts continue to improve \par All questions answered. Patient verbalized understanding and agreed with the plan.

## 2023-07-28 NOTE — REVIEW OF SYSTEMS
[Skin Rash] : skin rash [Negative] : Allergic/Immunologic [FreeTextEntry5] : b/l leg swelling  [de-identified] : erythroderma

## 2023-07-28 NOTE — PHYSICAL EXAM
[Fully active, able to carry on all pre-disease performance without restriction] : Status 0 - Fully active, able to carry on all pre-disease performance without restriction [Normal] : full range of motion and no deformities appreciated [de-identified] : blepheritis no vision changes  [de-identified] : +2 pitting edema b/l lower ext  [de-identified] : generalized redness of the skin d/t erythroderma

## 2023-07-28 NOTE — HISTORY OF PRESENT ILLNESS
[de-identified] : (07/26/2023) Mr. 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 (single nodule that was treated with radiation), diffuse erythroderma (initial rash starting February 2023, worsened with May 2023) on Prednisone taper, seen in hospital follow up visit for drug induced thrombocytopenia. \par As per pt, he was started on his first dose of Skyrizi (Risankizumab, a humanized monoclonal antibody targeting interleukin 23A (IL-23A)) on 7/11. Since then, he has been experiencing increased weakness, fatigue, bilateral LE edema and fever and was admitted on 7/14 with leukocytosis and mild anemia (WBC 13.23, ANC 11.83, Hgb 12.1). Platelet normal 231k. Found to test + for seasonal coronavirus. He reports his fever and SOB to resolve within the first 2 days of his admission. \par His course had been complicated by thrombocytopenia, first episode on 7/17 with a count of 143k and continued to get worse throughout his stay. He was started on Atovaquone for PCP ppx on 7/18 which seems to have exacerbate his thrombocytopenia even more. Atovaquone was discontinued 7/21. His lowest platelet count was 13k on 7/23. PF4 Ab was negative. No splenomegaly on spleen US 7/20. Peripheral smear showed many reticulocytes, RBCs without central pallor, tear drop cells, large platelets.\par He was discharged with a platelet count of 23k on 7/24. Today, his count has improved to 43. He remains with neutrophilic leukocytosis and mild anemia. Currently on taper 10mg, to decrease to 5mg tomorrow x 5 days. Denies fatigue, easily bleeding, bruising, abd pain, early satiety.

## 2023-07-28 NOTE — REASON FOR VISIT
[Initial Consultation] : an initial consultation for [Spouse] : spouse [FreeTextEntry2] : Drug induced thrombocytopenia

## 2023-07-28 NOTE — ASSESSMENT
[FreeTextEntry1] : (07/26/2023) Mr. 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 (single nodule that was treated with radiation), diffuse erythroderma (initial rash starting February 2023, worsened with May 2023) on Prednisone taper, seen in hospital follow up visit for drug induced thrombocytopenia. He developed fever and SOB s/p Skyrizi (7/11) for his diffuse erythroderma. Counts upon presentation with normal platelet count, but became thrombocytopenic throughout his stay correlating with the jenna period of his Skyrizi injection (usually drug induced ITP occurs approximately 1 to 2 weeks after initial drug exposure, a timeframe consistent with production of drug-dependent or drug metabolite–dependent IgG antibodies). Concurrently, he tested positive for seasonal coronavirus. His thrombocytopenia was most likely drug induced (Skyrizi, although not much data supports thrombocytopenia to be a side effect)) and post viral illness, exacerbated by Mepron. Lowest platelet count 13k. Platelet counts improved with discontinuation of Mepron. He denies any S&S of thrombocytopenia. Will proceed with the following plan: \par \par \par [ ] Drug induced thrombocytopenia (DITP)\par - Most likely d/t Skyrizi + s/p viral illness as pt became thrombocytopenic before starting Atovaquone \par - Thrombocytopenia exacerbated by Atovaquone use - lowest count 13k\par - PF4 Ab was negative (inpatient)\par - No splenomegaly on spleen US 7/20 (inpatient)\par - Platelet count improving - count today 43k from 23k on 7/24\par - Transfuse to maintain platelets >10K, >20K if febrile, and >50K for fevers or planned procedures\par - Refrain from Atovaquone or any other pharmacological therapies associated with DITP \par - Check counts this Friday as per pt's request \par - Check weekly counts then q2 weeks then monthly \par \par [ ] Leukocytosis\par - Pt reported hx of leukocytosis in the past \par - Has been on Prednisone taper 80mg -> 10mg, will transition to 5mg tomorrow to take x 5 days \par - Most likely steroid induced + erythroderma psoriasis \par RTC in 2-3 months\par Case and management discussed with Dr. Harris\par \par [ ] Erythroderma \par - Hold off tx for now until platelet counts recover \par - Refrain from all medications assoc with DITP \par - Advise against attempting Skyrizi, opt for alternative tx \par - F/u w/ derm \par _________________________________________________\par As per American Society of Hematology Self-Assessment Program, Seventh Edition by Beto et al - \par DITP characteristically occurs approximately 1 to 2 weeks after initial drug exposure, a timeframe consistent with production of drug-dependent or drug metabolite–dependent IgG antibodies.\par DITP is characteristically severe, with a median jenna platelet count of approximately 20 × 109/L and a high risk\par of hemorrhage. A review of 247 case reports of DITP found an incidence of major and fatal bleeding of 9% and\par 0.8%, respectively. Treatment for DITP involves discontinuation of the offending drug. A practical approach in hospitalized patients on multiple medications is to stop all drugs started within the last 2 weeks (excluding electrolytes and nutrients), when feasible, and to switch antibiotics. The platelet count starts to recover after 4 to 5 half-lives of the culprit drug or drug metabolite, which can last several days. Patients with severe thrombocytopenia and bleeding, as well as those judged to be at particularly high risk of bleeding, may be treated with IVIG, corticosteroids, or plasma exchange, though there is only limited evidence to support\par these interventions. Platelet transfusion is generally ineffective. Patients should be instructed to avoid the culprit drug in the future, and it should be added to their allergy list.

## 2023-08-01 ENCOUNTER — RESULT REVIEW (OUTPATIENT)
Age: 78
End: 2023-08-01

## 2023-08-01 ENCOUNTER — APPOINTMENT (OUTPATIENT)
Dept: HEMATOLOGY ONCOLOGY | Facility: CLINIC | Age: 78
End: 2023-08-01

## 2023-08-01 ENCOUNTER — TRANSCRIPTION ENCOUNTER (OUTPATIENT)
Age: 78
End: 2023-08-01

## 2023-08-01 LAB
BASOPHILS # BLD AUTO: 0.14 K/UL — SIGNIFICANT CHANGE UP (ref 0–0.2)
BASOPHILS NFR BLD AUTO: 1 % — SIGNIFICANT CHANGE UP (ref 0–2)
EOSINOPHIL # BLD AUTO: 0.35 K/UL — SIGNIFICANT CHANGE UP (ref 0–0.5)
EOSINOPHIL NFR BLD AUTO: 2.4 % — SIGNIFICANT CHANGE UP (ref 0–6)
HCT VFR BLD CALC: 38 % — LOW (ref 39–50)
HGB BLD-MCNC: 12.3 G/DL — LOW (ref 13–17)
IMM GRANULOCYTES NFR BLD AUTO: 4.1 % — HIGH (ref 0–0.9)
LYMPHOCYTES # BLD AUTO: 14.9 % — SIGNIFICANT CHANGE UP (ref 13–44)
LYMPHOCYTES # BLD AUTO: 2.15 K/UL — SIGNIFICANT CHANGE UP (ref 1–3.3)
MCHC RBC-ENTMCNC: 31.1 PG — SIGNIFICANT CHANGE UP (ref 27–34)
MCHC RBC-ENTMCNC: 32.4 G/DL — SIGNIFICANT CHANGE UP (ref 32–36)
MCV RBC AUTO: 96.2 FL — SIGNIFICANT CHANGE UP (ref 80–100)
MONOCYTES # BLD AUTO: 1.17 K/UL — HIGH (ref 0–0.9)
MONOCYTES NFR BLD AUTO: 8.1 % — SIGNIFICANT CHANGE UP (ref 2–14)
NEUTROPHILS # BLD AUTO: 10.07 K/UL — HIGH (ref 1.8–7.4)
NEUTROPHILS NFR BLD AUTO: 69.5 % — SIGNIFICANT CHANGE UP (ref 43–77)
NRBC # BLD: 0 /100 WBCS — SIGNIFICANT CHANGE UP (ref 0–0)
PLATELET # BLD AUTO: 148 K/UL — LOW (ref 150–400)
RBC # BLD: 3.95 M/UL — LOW (ref 4.2–5.8)
RBC # FLD: 14.1 % — SIGNIFICANT CHANGE UP (ref 10.3–14.5)
WBC # BLD: 14.47 K/UL — HIGH (ref 3.8–10.5)
WBC # FLD AUTO: 14.47 K/UL — HIGH (ref 3.8–10.5)

## 2023-08-02 ENCOUNTER — TRANSCRIPTION ENCOUNTER (OUTPATIENT)
Age: 78
End: 2023-08-02

## 2023-08-03 RX ORDER — TILDRAKIZUMAB-ASMN 100 MG/ML
100 INJECTION, SOLUTION SUBCUTANEOUS
Qty: 2 | Refills: 0 | Status: ACTIVE | COMMUNITY
Start: 2023-08-03 | End: 1900-01-01

## 2023-08-03 RX ORDER — TILDRAKIZUMAB-ASMN 100 MG/ML
100 INJECTION, SOLUTION SUBCUTANEOUS
Qty: 3 | Refills: 1 | Status: ACTIVE | COMMUNITY
Start: 2023-08-03 | End: 1900-01-01

## 2023-08-04 ENCOUNTER — NON-APPOINTMENT (OUTPATIENT)
Age: 78
End: 2023-08-04

## 2023-08-09 ENCOUNTER — APPOINTMENT (OUTPATIENT)
Dept: DERMATOLOGY | Facility: CLINIC | Age: 78
End: 2023-08-09

## 2023-08-09 DIAGNOSIS — D48.9 NEOPLASM OF UNCERTAIN BEHAVIOR, UNSPECIFIED: ICD-10-CM

## 2023-08-11 ENCOUNTER — APPOINTMENT (OUTPATIENT)
Dept: HEMATOLOGY ONCOLOGY | Facility: CLINIC | Age: 78
End: 2023-08-11

## 2023-08-11 ENCOUNTER — RESULT REVIEW (OUTPATIENT)
Age: 78
End: 2023-08-11

## 2023-08-11 LAB
BASOPHILS # BLD AUTO: 0.16 K/UL — SIGNIFICANT CHANGE UP (ref 0–0.2)
BASOPHILS NFR BLD AUTO: 1.2 % — SIGNIFICANT CHANGE UP (ref 0–2)
EOSINOPHIL # BLD AUTO: 0.62 K/UL — HIGH (ref 0–0.5)
EOSINOPHIL NFR BLD AUTO: 4.7 % — SIGNIFICANT CHANGE UP (ref 0–6)
HCT VFR BLD CALC: 38 % — LOW (ref 39–50)
HGB BLD-MCNC: 11.9 G/DL — LOW (ref 13–17)
IMM GRANULOCYTES NFR BLD AUTO: 2.6 % — HIGH (ref 0–0.9)
LYMPHOCYTES # BLD AUTO: 19.8 % — SIGNIFICANT CHANGE UP (ref 13–44)
LYMPHOCYTES # BLD AUTO: 2.59 K/UL — SIGNIFICANT CHANGE UP (ref 1–3.3)
MCHC RBC-ENTMCNC: 30.1 PG — SIGNIFICANT CHANGE UP (ref 27–34)
MCHC RBC-ENTMCNC: 31.3 G/DL — LOW (ref 32–36)
MCV RBC AUTO: 96 FL — SIGNIFICANT CHANGE UP (ref 80–100)
MONOCYTES # BLD AUTO: 1.1 K/UL — HIGH (ref 0–0.9)
MONOCYTES NFR BLD AUTO: 8.4 % — SIGNIFICANT CHANGE UP (ref 2–14)
NEUTROPHILS # BLD AUTO: 8.27 K/UL — HIGH (ref 1.8–7.4)
NEUTROPHILS NFR BLD AUTO: 63.3 % — SIGNIFICANT CHANGE UP (ref 43–77)
NRBC # BLD: 0 /100 WBCS — SIGNIFICANT CHANGE UP (ref 0–0)
PLATELET # BLD AUTO: 101 K/UL — LOW (ref 150–400)
RBC # BLD: 3.96 M/UL — LOW (ref 4.2–5.8)
RBC # FLD: 13.6 % — SIGNIFICANT CHANGE UP (ref 10.3–14.5)
WBC # BLD: 13.08 K/UL — HIGH (ref 3.8–10.5)
WBC # FLD AUTO: 13.08 K/UL — HIGH (ref 3.8–10.5)

## 2023-09-20 ENCOUNTER — RESULT REVIEW (OUTPATIENT)
Age: 78
End: 2023-09-20

## 2023-09-20 ENCOUNTER — APPOINTMENT (OUTPATIENT)
Dept: HEMATOLOGY ONCOLOGY | Facility: CLINIC | Age: 78
End: 2023-09-20
Payer: MEDICARE

## 2023-09-20 VITALS
TEMPERATURE: 97.2 F | HEART RATE: 66 BPM | RESPIRATION RATE: 17 BRPM | WEIGHT: 170.2 LBS | BODY MASS INDEX: 27.48 KG/M2 | DIASTOLIC BLOOD PRESSURE: 77 MMHG | OXYGEN SATURATION: 97 % | SYSTOLIC BLOOD PRESSURE: 143 MMHG

## 2023-09-20 DIAGNOSIS — T50.905A OTHER SECONDARY THROMBOCYTOPENIA: ICD-10-CM

## 2023-09-20 DIAGNOSIS — D69.59 OTHER SECONDARY THROMBOCYTOPENIA: ICD-10-CM

## 2023-09-20 LAB
BASOPHILS # BLD AUTO: 0.12 K/UL — SIGNIFICANT CHANGE UP (ref 0–0.2)
BASOPHILS NFR BLD AUTO: 0.9 % — SIGNIFICANT CHANGE UP (ref 0–2)
EOSINOPHIL # BLD AUTO: 0.88 K/UL — HIGH (ref 0–0.5)
EOSINOPHIL NFR BLD AUTO: 6.8 % — HIGH (ref 0–6)
HCT VFR BLD CALC: 39.6 % — SIGNIFICANT CHANGE UP (ref 39–50)
HGB BLD-MCNC: 12.6 G/DL — LOW (ref 13–17)
IMM GRANULOCYTES NFR BLD AUTO: 1.2 % — HIGH (ref 0–0.9)
LYMPHOCYTES # BLD AUTO: 16.4 % — SIGNIFICANT CHANGE UP (ref 13–44)
LYMPHOCYTES # BLD AUTO: 2.11 K/UL — SIGNIFICANT CHANGE UP (ref 1–3.3)
MCHC RBC-ENTMCNC: 29.3 PG — SIGNIFICANT CHANGE UP (ref 27–34)
MCHC RBC-ENTMCNC: 31.8 G/DL — LOW (ref 32–36)
MCV RBC AUTO: 92.1 FL — SIGNIFICANT CHANGE UP (ref 80–100)
MONOCYTES # BLD AUTO: 0.98 K/UL — HIGH (ref 0–0.9)
MONOCYTES NFR BLD AUTO: 7.6 % — SIGNIFICANT CHANGE UP (ref 2–14)
NEUTROPHILS # BLD AUTO: 8.65 K/UL — HIGH (ref 1.8–7.4)
NEUTROPHILS NFR BLD AUTO: 67.1 % — SIGNIFICANT CHANGE UP (ref 43–77)
NRBC # BLD: 0 /100 WBCS — SIGNIFICANT CHANGE UP (ref 0–0)
PLATELET # BLD AUTO: 107 K/UL — LOW (ref 150–400)
RBC # BLD: 4.3 M/UL — SIGNIFICANT CHANGE UP (ref 4.2–5.8)
RBC # FLD: 12.6 % — SIGNIFICANT CHANGE UP (ref 10.3–14.5)
WBC # BLD: 12.89 K/UL — HIGH (ref 3.8–10.5)
WBC # FLD AUTO: 12.89 K/UL — HIGH (ref 3.8–10.5)

## 2023-09-20 PROCEDURE — 99215 OFFICE O/P EST HI 40 MIN: CPT

## 2023-12-08 RX ORDER — RISANKIZUMAB-RZAA 150 MG/ML
150 INJECTION SUBCUTANEOUS
Qty: 1 | Refills: 1 | Status: ACTIVE | COMMUNITY
Start: 2023-06-30

## 2024-03-14 NOTE — ED PROVIDER NOTE - OBJECTIVE STATEMENT
78-year-old male with past medical history of primary cutaneous B-cell lymphoma (single nodule that was treated with radiation) and diffuse erythroderma (initial rash starting February 2023, worsened with May 2023) currently on prednisone taper with first dose Skyrizi 7/11 presents to the emergency department complaining of fever that started last night.  Patient reports Tmax of 102, took Tylenol last night but did not take any antipyretics this morning.  He reports that since starting Skyrizi he has felt unwell with increased fatigue and myalgias.  He also reports that he has had worsened lower extremity edema.  He denies headaches, dizziness, chest pain, shortness of breath, cough, sick contacts, abdominal pain, nausea, vomiting, diarrhea or dysuria. The patient is a 1y8m Male complaining of suture/staple removal.

## 2024-03-24 NOTE — PROVIDER CONTACT NOTE (CRITICAL VALUE NOTIFICATION) - NS PROVIDER READ BACK
[facility]     Surgery Progress Note    Patient Name: Ascencion Murphy  :    1940  MRN:    [id number]  Date of admission:  3/21/2024  Length of Stay: 2 days    Subjective   Patient pulled his NG tube out yesterday.  Nevertheless, patient has had no nausea or vomiting.  He had 2 bowel movements over the past 24 hours.  Oral contrast given yesterday is in the colon on last night's abdominal x-ray.    Objective     Vitals:   Temp:  [98.1 °F (36.7 °C)-99 °F (37.2 °C)] 98.1 °F (36.7 °C)  Heart Rate:  [50-67] 55  Resp:  [18] 18  BP: (131-156)/(65-92) 132/69  Physical Exam  Constitutional: alert, no acute distress  Respiratory:  breathing not labored, respiratory effort appears normal  Cardiovascular:  heart regular rate  Abdomen:  soft, nondistended, nontender  Musculoskeletal: moving all extremities symmetrically and purposefully  Neurologic:  no obvious motor or sensory deficits  Psychiatric:  judgment and insight intact      Assessment / Plan     Assessment:   Active Hospital Problems    Diagnosis     **SBO (small bowel obstruction)      Plan:    Vitals, labs, and exam unremarkable, and contrast goes to colon after gastrografin challenge.  Small bowel obstructions appears to have resolved.  Will start full liquid diet.  If patient continues to do well over the next 24 hours then can d/c home from a surgical perspective.     Electronically signed by Paul Naidu MD, 24, 8:58 AM EDT.    
yes

## 2024-05-26 NOTE — PATIENT PROFILE ADULT - NSPROEXTENSIONSOFSELF_GEN_A_NUR
A dietitian can help personalize your nutrition plan if you are on a special diet or have difficulty swallowing.    Nutrition-Related Questions: Elodia MURILLO (Dietitian)  Phone: [phone]    none